# Patient Record
Sex: FEMALE | Race: WHITE | NOT HISPANIC OR LATINO | URBAN - METROPOLITAN AREA
[De-identification: names, ages, dates, MRNs, and addresses within clinical notes are randomized per-mention and may not be internally consistent; named-entity substitution may affect disease eponyms.]

---

## 2017-06-30 ENCOUNTER — EMERGENCY (EMERGENCY)
Facility: HOSPITAL | Age: 82
LOS: 1 days | Discharge: HOME | End: 2017-06-30
Attending: EMERGENCY MEDICINE | Admitting: EMERGENCY MEDICINE
Payer: MEDICARE

## 2017-06-30 VITALS
HEART RATE: 76 BPM | DIASTOLIC BLOOD PRESSURE: 95 MMHG | TEMPERATURE: 97 F | RESPIRATION RATE: 16 BRPM | SYSTOLIC BLOOD PRESSURE: 155 MMHG | OXYGEN SATURATION: 99 %

## 2017-06-30 VITALS
OXYGEN SATURATION: 99 % | DIASTOLIC BLOOD PRESSURE: 80 MMHG | RESPIRATION RATE: 16 BRPM | TEMPERATURE: 98 F | HEART RATE: 78 BPM | HEIGHT: 58 IN | WEIGHT: 149.91 LBS | SYSTOLIC BLOOD PRESSURE: 146 MMHG

## 2017-06-30 DIAGNOSIS — Z96.651 PRESENCE OF RIGHT ARTIFICIAL KNEE JOINT: Chronic | ICD-10-CM

## 2017-06-30 PROCEDURE — 99283 EMERGENCY DEPT VISIT LOW MDM: CPT | Mod: 25

## 2017-06-30 PROCEDURE — 73562 X-RAY EXAM OF KNEE 3: CPT

## 2017-06-30 PROCEDURE — 99283 EMERGENCY DEPT VISIT LOW MDM: CPT

## 2017-06-30 PROCEDURE — 73562 X-RAY EXAM OF KNEE 3: CPT | Mod: 26,LT

## 2017-06-30 NOTE — ED PROVIDER NOTE - PHYSICAL EXAMINATION
Constitutional: Alert, NAD.   ENT: Airway patent. Nose clear. Mouth with normal mucosa.   Head: Atraumatic.   Eyes: Clear bilaterally. PERRL.   Cardiac: Normal rate.   Respiratory: Breath sounds clear bilaterally.   GI: Abdomen soft, non-tender, no guarding.   : No CVA or bladder tenderness.   Musculoskeletal: painful ROM of left knee and tenderness to palpation   Neuro: alert and oriented, no focal deficits, no motor or sensory deficits.   Skin: Dry, intact, no rash.   Psych: normal mood and affect.

## 2017-06-30 NOTE — ED PROVIDER NOTE - MEDICAL DECISION MAKING DETAILS
Patient given detailed discharge and return instructions and verbalized understanding.  Patient will follow up without fail.  All questions answered.  Able to ambulate w/o difficulty.

## 2017-06-30 NOTE — ED PROVIDER NOTE - OBJECTIVE STATEMENT
CC: left knee pain   Presenting symptoms: 85yo female s/p trip on mat at gym and fell onto her left knee.  Patient c/o only left knee pain and injury.  Pertinent Positives: knee pain  Pertinent Negatives: no head injury, no LOC, no CP, no SOB, no abd pain  Timing: sudden JPTA  Quality: aching  Radiation: none  Severity: moderate  Aggravating Factors: bending knee  Relieving Factors: rest/immobilization

## 2017-06-30 NOTE — ED PROVIDER NOTE - NS ED ROS FT
no fever  no chest pain  no SOB  no abd pain  no HA  + left knee pain  All other ROS negative except as per HPI

## 2017-06-30 NOTE — ED ADULT NURSE NOTE - OBJECTIVE STATEMENT
84 year old female in no acute distress, A & O X 3, BIBA from gym c/o left knee pain s/p mechanical fall. Denies head injury, carpet imprint noted with mild swelling to left knee. +, + pedal pulses bilaterally. 84 year old female in no acute distress, A & O X 3, BIBA from gym c/o left knee pain s/p mechanical fall. Denies head injury, carpet imprint noted with mild swelling to left knee. +, + pedal pulses bilaterally. Fall precautions in place, will monitor.

## 2017-06-30 NOTE — ED ADULT TRIAGE NOTE - CHIEF COMPLAINT QUOTE
BIBA, patient is awake and oriented times 3, complains a slip and fall at the pool at the gym today, denies any head injury, denies any use of blood thinners

## 2018-09-12 ENCOUNTER — APPOINTMENT (OUTPATIENT)
Dept: DERMATOLOGY | Facility: CLINIC | Age: 83
End: 2018-09-12
Payer: MEDICARE

## 2018-09-12 PROCEDURE — 99203 OFFICE O/P NEW LOW 30 MIN: CPT | Mod: 25

## 2018-09-12 PROCEDURE — 17000 DESTRUCT PREMALG LESION: CPT

## 2018-12-18 ENCOUNTER — EMERGENCY (EMERGENCY)
Facility: HOSPITAL | Age: 83
LOS: 1 days | Discharge: HOME | End: 2018-12-18
Attending: STUDENT IN AN ORGANIZED HEALTH CARE EDUCATION/TRAINING PROGRAM
Payer: MEDICARE

## 2018-12-18 VITALS
RESPIRATION RATE: 18 BRPM | HEIGHT: 58 IN | TEMPERATURE: 98 F | HEART RATE: 85 BPM | DIASTOLIC BLOOD PRESSURE: 72 MMHG | SYSTOLIC BLOOD PRESSURE: 119 MMHG | WEIGHT: 149.91 LBS | OXYGEN SATURATION: 99 %

## 2018-12-18 DIAGNOSIS — Z96.651 PRESENCE OF RIGHT ARTIFICIAL KNEE JOINT: Chronic | ICD-10-CM

## 2018-12-18 PROCEDURE — 99284 EMERGENCY DEPT VISIT MOD MDM: CPT | Mod: 25

## 2018-12-18 PROCEDURE — 93010 ELECTROCARDIOGRAM REPORT: CPT

## 2018-12-18 RX ORDER — ONDANSETRON 8 MG/1
4 TABLET, FILM COATED ORAL ONCE
Qty: 0 | Refills: 0 | Status: COMPLETED | OUTPATIENT
Start: 2018-12-18 | End: 2018-12-19

## 2018-12-18 RX ORDER — FAMOTIDINE 10 MG/ML
20 INJECTION INTRAVENOUS ONCE
Qty: 0 | Refills: 0 | Status: COMPLETED | OUTPATIENT
Start: 2018-12-18 | End: 2018-12-19

## 2018-12-18 RX ORDER — SODIUM CHLORIDE 9 MG/ML
1000 INJECTION INTRAMUSCULAR; INTRAVENOUS; SUBCUTANEOUS
Qty: 0 | Refills: 0 | Status: DISCONTINUED | OUTPATIENT
Start: 2018-12-18 | End: 2018-12-23

## 2018-12-18 RX ORDER — SODIUM CHLORIDE 9 MG/ML
1000 INJECTION INTRAMUSCULAR; INTRAVENOUS; SUBCUTANEOUS ONCE
Qty: 0 | Refills: 0 | Status: COMPLETED | OUTPATIENT
Start: 2018-12-18 | End: 2018-12-18

## 2018-12-18 RX ORDER — SODIUM CHLORIDE 9 MG/ML
3 INJECTION INTRAMUSCULAR; INTRAVENOUS; SUBCUTANEOUS ONCE
Qty: 0 | Refills: 0 | Status: COMPLETED | OUTPATIENT
Start: 2018-12-18 | End: 2018-12-18

## 2018-12-18 RX ORDER — KETOROLAC TROMETHAMINE 30 MG/ML
15 SYRINGE (ML) INJECTION ONCE
Qty: 0 | Refills: 0 | Status: DISCONTINUED | OUTPATIENT
Start: 2018-12-18 | End: 2018-12-18

## 2018-12-18 NOTE — ED PROVIDER NOTE - CHPI ED SYMPTOMS NEG
no fever/no blood in stool/hematemesis, chest pain, palpitations, dizziness, shortness of breath/no chills

## 2018-12-18 NOTE — ED ADULT TRIAGE NOTE - CHIEF COMPLAINT QUOTE
pt a+ox3, BIBA c/o abd pain, n/v/d. pt reports eating dinner without any issue around 1700 and symptoms onset at 2000. pt denies chest pain or SOB, headache. pt denies recent fever, chills, nightsweats.

## 2018-12-18 NOTE — ED PROVIDER NOTE - MEDICAL DECISION MAKING DETAILS
Will hydrate, obtain labs and treat abdominal tenderness, likely secondary to cramping from diarrhea and vomiting, if not improved will obtain CT.

## 2018-12-18 NOTE — ED PROVIDER NOTE - OBJECTIVE STATEMENT
86 y/o F with PMHx of hypothyroid and HLD presents to ED c/o sudden onset of weakness that began tonight. States she has been experiencing multiple episodes of diarrhea and vomiting for several hours and after her most recent episode, while sitting on the couch, she went to stand up, felt weak and had to sit back down immediately to avoid falling. As per son, during this episode of sudden weakness when she sat back down on the couch "she was just staring into space and did not respond to our questions", which is why they activated EMS. Since being in the ED, she has began to develop a headache. Denies recent fevers or chills, chest pain, palpitations, shortness of breath, dizziness, abdominal pain, hematemesis or blood in the stool. Has not been on antibiotics recently. Does note she had a cold last week. No further acute complaints at this time.

## 2018-12-19 VITALS
DIASTOLIC BLOOD PRESSURE: 72 MMHG | RESPIRATION RATE: 18 BRPM | TEMPERATURE: 98 F | HEART RATE: 86 BPM | SYSTOLIC BLOOD PRESSURE: 121 MMHG | OXYGEN SATURATION: 99 %

## 2018-12-19 LAB
ALBUMIN SERPL ELPH-MCNC: 4.1 G/DL — SIGNIFICANT CHANGE UP (ref 3.3–5.2)
ALP SERPL-CCNC: 61 U/L — SIGNIFICANT CHANGE UP (ref 40–120)
ALT FLD-CCNC: 13 U/L — SIGNIFICANT CHANGE UP
ANION GAP SERPL CALC-SCNC: 15 MMOL/L — SIGNIFICANT CHANGE UP (ref 5–17)
APPEARANCE UR: ABNORMAL
AST SERPL-CCNC: 21 U/L — SIGNIFICANT CHANGE UP
BACTERIA # UR AUTO: ABNORMAL
BASOPHILS # BLD AUTO: 0 K/UL — SIGNIFICANT CHANGE UP (ref 0–0.2)
BASOPHILS NFR BLD AUTO: 0 % — SIGNIFICANT CHANGE UP (ref 0–2)
BILIRUB SERPL-MCNC: 0.5 MG/DL — SIGNIFICANT CHANGE UP (ref 0.4–2)
BILIRUB UR-MCNC: NEGATIVE — SIGNIFICANT CHANGE UP
BUN SERPL-MCNC: 21 MG/DL — HIGH (ref 8–20)
CALCIUM SERPL-MCNC: 9.4 MG/DL — SIGNIFICANT CHANGE UP (ref 8.6–10.2)
CHLORIDE SERPL-SCNC: 103 MMOL/L — SIGNIFICANT CHANGE UP (ref 98–107)
CO2 SERPL-SCNC: 25 MMOL/L — SIGNIFICANT CHANGE UP (ref 22–29)
COLOR SPEC: YELLOW — SIGNIFICANT CHANGE UP
CREAT SERPL-MCNC: 0.59 MG/DL — SIGNIFICANT CHANGE UP (ref 0.5–1.3)
DIFF PNL FLD: ABNORMAL
EOSINOPHIL # BLD AUTO: 0.1 K/UL — SIGNIFICANT CHANGE UP (ref 0–0.5)
EOSINOPHIL NFR BLD AUTO: 0 % — SIGNIFICANT CHANGE UP (ref 0–5)
EPI CELLS # UR: SIGNIFICANT CHANGE UP
GLUCOSE SERPL-MCNC: 118 MG/DL — HIGH (ref 70–115)
GLUCOSE UR QL: NEGATIVE MG/DL — SIGNIFICANT CHANGE UP
HCT VFR BLD CALC: 46.3 % — SIGNIFICANT CHANGE UP (ref 37–47)
HGB BLD-MCNC: 14.7 G/DL — SIGNIFICANT CHANGE UP (ref 12–16)
KETONES UR-MCNC: ABNORMAL
LEUKOCYTE ESTERASE UR-ACNC: ABNORMAL
LIDOCAIN IGE QN: 20 U/L — LOW (ref 22–51)
LYMPHOCYTES # BLD AUTO: 1.2 K/UL — SIGNIFICANT CHANGE UP (ref 1–4.8)
LYMPHOCYTES # BLD AUTO: 10 % — LOW (ref 20–55)
MCHC RBC-ENTMCNC: 29.6 PG — SIGNIFICANT CHANGE UP (ref 27–31)
MCHC RBC-ENTMCNC: 31.7 G/DL — LOW (ref 32–36)
MCV RBC AUTO: 93.3 FL — SIGNIFICANT CHANGE UP (ref 81–99)
MONOCYTES # BLD AUTO: 0.9 K/UL — HIGH (ref 0–0.8)
MONOCYTES NFR BLD AUTO: 7 % — SIGNIFICANT CHANGE UP (ref 3–10)
NEUTROPHILS # BLD AUTO: 13.3 K/UL — HIGH (ref 1.8–8)
NEUTROPHILS NFR BLD AUTO: 83 % — HIGH (ref 37–73)
NITRITE UR-MCNC: POSITIVE
PH UR: 6.5 — SIGNIFICANT CHANGE UP (ref 5–8)
PLAT MORPH BLD: NORMAL — SIGNIFICANT CHANGE UP
PLATELET # BLD AUTO: 215 K/UL — SIGNIFICANT CHANGE UP (ref 150–400)
POTASSIUM SERPL-MCNC: 4.7 MMOL/L — SIGNIFICANT CHANGE UP (ref 3.5–5.3)
POTASSIUM SERPL-SCNC: 4.7 MMOL/L — SIGNIFICANT CHANGE UP (ref 3.5–5.3)
PROT SERPL-MCNC: 7.1 G/DL — SIGNIFICANT CHANGE UP (ref 6.6–8.7)
PROT UR-MCNC: 30 MG/DL
RBC # BLD: 4.96 M/UL — SIGNIFICANT CHANGE UP (ref 4.4–5.2)
RBC # FLD: 13.4 % — SIGNIFICANT CHANGE UP (ref 11–15.6)
RBC BLD AUTO: NORMAL — SIGNIFICANT CHANGE UP
RBC CASTS # UR COMP ASSIST: SIGNIFICANT CHANGE UP /HPF (ref 0–4)
SODIUM SERPL-SCNC: 143 MMOL/L — SIGNIFICANT CHANGE UP (ref 135–145)
SP GR SPEC: 1.01 — SIGNIFICANT CHANGE UP (ref 1.01–1.02)
UROBILINOGEN FLD QL: NEGATIVE MG/DL — SIGNIFICANT CHANGE UP
WBC # BLD: 15.6 K/UL — HIGH (ref 4.8–10.8)
WBC # FLD AUTO: 15.6 K/UL — HIGH (ref 4.8–10.8)
WBC UR QL: SIGNIFICANT CHANGE UP

## 2018-12-19 PROCEDURE — 96375 TX/PRO/DX INJ NEW DRUG ADDON: CPT

## 2018-12-19 PROCEDURE — 74177 CT ABD & PELVIS W/CONTRAST: CPT

## 2018-12-19 PROCEDURE — 99284 EMERGENCY DEPT VISIT MOD MDM: CPT | Mod: 25

## 2018-12-19 PROCEDURE — 85027 COMPLETE CBC AUTOMATED: CPT

## 2018-12-19 PROCEDURE — 96374 THER/PROPH/DIAG INJ IV PUSH: CPT | Mod: XU

## 2018-12-19 PROCEDURE — 81001 URINALYSIS AUTO W/SCOPE: CPT

## 2018-12-19 PROCEDURE — 74177 CT ABD & PELVIS W/CONTRAST: CPT | Mod: 26

## 2018-12-19 PROCEDURE — 36415 COLL VENOUS BLD VENIPUNCTURE: CPT

## 2018-12-19 PROCEDURE — 80053 COMPREHEN METABOLIC PANEL: CPT

## 2018-12-19 PROCEDURE — 83690 ASSAY OF LIPASE: CPT

## 2018-12-19 PROCEDURE — 93005 ELECTROCARDIOGRAM TRACING: CPT

## 2018-12-19 RX ORDER — CEPHALEXIN 500 MG
1 CAPSULE ORAL
Qty: 20 | Refills: 0
Start: 2018-12-19 | End: 2018-12-28

## 2018-12-19 RX ORDER — ONDANSETRON 8 MG/1
1 TABLET, FILM COATED ORAL
Qty: 16 | Refills: 0
Start: 2018-12-19 | End: 2018-12-22

## 2018-12-19 RX ORDER — CEFTRIAXONE 500 MG/1
1 INJECTION, POWDER, FOR SOLUTION INTRAMUSCULAR; INTRAVENOUS ONCE
Qty: 0 | Refills: 0 | Status: COMPLETED | OUTPATIENT
Start: 2018-12-19 | End: 2018-12-19

## 2018-12-19 RX ADMIN — FAMOTIDINE 20 MILLIGRAM(S): 10 INJECTION INTRAVENOUS at 00:42

## 2018-12-19 RX ADMIN — CEFTRIAXONE 100 GRAM(S): 500 INJECTION, POWDER, FOR SOLUTION INTRAMUSCULAR; INTRAVENOUS at 03:38

## 2018-12-19 RX ADMIN — SODIUM CHLORIDE 3 MILLILITER(S): 9 INJECTION INTRAMUSCULAR; INTRAVENOUS; SUBCUTANEOUS at 00:45

## 2018-12-19 RX ADMIN — SODIUM CHLORIDE 125 MILLILITER(S): 9 INJECTION INTRAMUSCULAR; INTRAVENOUS; SUBCUTANEOUS at 02:05

## 2018-12-19 RX ADMIN — SODIUM CHLORIDE 1000 MILLILITER(S): 9 INJECTION INTRAMUSCULAR; INTRAVENOUS; SUBCUTANEOUS at 00:32

## 2018-12-19 RX ADMIN — Medication 15 MILLIGRAM(S): at 00:42

## 2018-12-19 RX ADMIN — ONDANSETRON 4 MILLIGRAM(S): 8 TABLET, FILM COATED ORAL at 00:41

## 2018-12-19 NOTE — ED ADULT NURSE NOTE - OBJECTIVE STATEMENT
Pt reports n/v/d since ~2000, son at bedside reports findings pt and pt was having a "blank stare and unable to register situation", pt A&Ox3 at the time, tenderness to palpation, pleasant and cooperative to care, offers no complaints, orders initiated.

## 2018-12-19 NOTE — ED ADULT NURSE NOTE - NSIMPLEMENTINTERV_GEN_ALL_ED
Implemented All Universal Safety Interventions:  [place] to call system. Call bell, personal items and telephone within reach. Instruct patient to call for assistance. Room bathroom lighting operational. Non-slip footwear when patient is off stretcher. Physically safe environment: no spills, clutter or unnecessary equipment. Stretcher in lowest position, wheels locked, appropriate side rails in place.

## 2018-12-19 NOTE — ED ADULT NURSE REASSESSMENT NOTE - NS ED NURSE REASSESS COMMENT FT1
Pt placed on bedpan, tolerated well, offers no complaints, awaiting test results, pt and son verbalize understanding of plan, safety maintained.

## 2018-12-19 NOTE — ED ADULT NURSE REASSESSMENT NOTE - NS ED NURSE REASSESS COMMENT FT1
Pt reports feeling better, discharge instructions given and explained, including discharge medication purpose, dose, frequency and s/e, pt verbalized understanding of instructions, questions encouraged and answered appropriately, PIV d/c'd per facility protocol, pt tolerated well, DCD in place, VSS, pt safely discharged home with son.

## 2019-04-23 ENCOUNTER — OTHER (OUTPATIENT)
Age: 84
End: 2019-04-23

## 2019-04-23 ENCOUNTER — APPOINTMENT (OUTPATIENT)
Dept: GASTROENTEROLOGY | Facility: CLINIC | Age: 84
End: 2019-04-23
Payer: MEDICARE

## 2019-04-23 VITALS
HEIGHT: 58 IN | SYSTOLIC BLOOD PRESSURE: 126 MMHG | OXYGEN SATURATION: 98 % | WEIGHT: 138 LBS | HEART RATE: 68 BPM | BODY MASS INDEX: 28.97 KG/M2 | RESPIRATION RATE: 14 BRPM | DIASTOLIC BLOOD PRESSURE: 68 MMHG

## 2019-04-23 LAB — CRP SERPL-MCNC: 0.17 MG/DL

## 2019-04-23 PROCEDURE — 99204 OFFICE O/P NEW MOD 45 MIN: CPT

## 2019-04-23 PROCEDURE — 82272 OCCULT BLD FECES 1-3 TESTS: CPT

## 2019-04-25 LAB
C DIFF TOX GENS STL QL NAA+PROBE: NORMAL
CDIFF BY PCR: NOT DETECTED
ERYTHROCYTE [SEDIMENTATION RATE] IN BLOOD BY WESTERGREN METHOD: 3 MM/HR
WRIGHT STN STL: NEGATIVE

## 2019-04-25 NOTE — CONSULT LETTER
[Dear  ___] : Dear  [unfilled], [Please see my note below.] : Please see my note below. [Consult Closing:] : Thank you very much for allowing me to participate in the care of this patient.  If you have any questions, please do not hesitate to contact me. [Consult Letter:] : I had the pleasure of evaluating your patient, [unfilled]. [Sincerely,] : Sincerely, [FreeTextEntry1] : Episodic diarrhea for about a year. No weight loss. History of neurofibromatosis no new medications. Distant antibiotics for UTI. Stool studies requested. CAT scan requested. Reevaluate in 2 months here. [FreeTextEntry3] : Olu Elizabeth MD FACG\par Diplomate American Board of Internal Medicine and Gastroenterolgy\par [facility]\par

## 2019-04-25 NOTE — PHYSICAL EXAM
[General Appearance - Alert] : alert [General Appearance - In No Acute Distress] : in no acute distress [Sclera] : the sclera and conjunctiva were normal [PERRL With Normal Accommodation] : pupils were equal in size, round, and reactive to light [Extraocular Movements] : extraocular movements were intact [Outer Ear] : the ears and nose were normal in appearance [Oropharynx] : the oropharynx was normal [Neck Appearance] : the appearance of the neck was normal [Neck Cervical Mass (___cm)] : no neck mass was observed [Jugular Venous Distention Increased] : there was no jugular-venous distention [Thyroid Diffuse Enlargement] : the thyroid was not enlarged [Thyroid Nodule] : there were no palpable thyroid nodules [Auscultation Breath Sounds / Voice Sounds] : lungs were clear to auscultation bilaterally [Heart Rate And Rhythm] : heart rate was normal and rhythm regular [Heart Sounds] : normal S1 and S2 [Murmurs] : no murmurs [Heart Sounds Gallop] : no gallops [Heart Sounds Pericardial Friction Rub] : no pericardial rub [Bowel Sounds] : normal bowel sounds [Abdomen Soft] : soft [] : no hepato-splenomegaly [Abdomen Tenderness] : non-tender [Normal Sphincter Tone] : normal sphincter tone [Abdomen Mass (___ Cm)] : no abdominal mass palpated [No Rectal Mass] : no rectal mass [Occult Blood Positive] : stool was negative for occult blood [FreeTextEntry1] : Stool is soft, semi- formed.. Brown. Occult blood negative.

## 2019-04-25 NOTE — ASSESSMENT
[FreeTextEntry1] : Chronic episodic diarrhea. Not clearly dietary, related. No offending medications, episodic diarrhea, suggests some dietary component.\par Recommended stool studies to be performed on the loose is still available. Proceed with CAT scan abdomen and pelvis with oral and IV contrast to exclude any major ongoing pathology. GI office followup in 2 months.

## 2019-04-25 NOTE — HISTORY OF PRESENT ILLNESS
[FreeTextEntry1] : T6-year-old white female with history of hypothyroidism, hyperlipidemia, osteoporosis, glaucoma, obesity, neurofibromatosis, P., referred for evaluation of episodic diarrhea or patient states that diarrhea recurs on a weekly basis. 5 or 6 days per week. Bowel movements will be normal. Formed and without any bleeding. He denies having any recent travel. She had last received antibiotics in December for urinary tract infection without any change in symptoms. Patient alleges that the loose bowel movements occur weekly and have been ongoing or about a year or so prior investigation. No weight loss. No new medications. There no history of lactose intolerance. We'll 16 with a Dr. Medina is also not available. Allegedly she has a distant history of colon polyps. Patient is known to have hemorrhoids and has occasional bleeding from the hemorrhoids being bright red blood and also notes occasional bleeding on the toilet paper with wiping. Denies any weight loss, tenderness, fever, or chills. Denies abdominal pain. Occasional cramps. No distention. Multiple medications are taken. None of which has recently changed. Family history is negative for colorectal neoplasia. No history of any liver disease.

## 2019-04-26 ENCOUNTER — OTHER (OUTPATIENT)
Age: 84
End: 2019-04-26

## 2019-04-28 LAB
BACTERIA STL CULT: NORMAL
DEPRECATED O AND P PREP STL: NORMAL
G LAMBLIA AG STL QL: NORMAL

## 2019-05-06 ENCOUNTER — FORM ENCOUNTER (OUTPATIENT)
Age: 84
End: 2019-05-06

## 2019-05-07 ENCOUNTER — OUTPATIENT (OUTPATIENT)
Dept: OUTPATIENT SERVICES | Facility: HOSPITAL | Age: 84
LOS: 1 days | End: 2019-05-07

## 2019-05-07 ENCOUNTER — APPOINTMENT (OUTPATIENT)
Dept: CT IMAGING | Facility: CLINIC | Age: 84
End: 2019-05-07
Payer: MEDICARE

## 2019-05-07 DIAGNOSIS — R10.817 GENERALIZED ABDOMINAL TENDERNESS: ICD-10-CM

## 2019-05-07 DIAGNOSIS — Z96.651 PRESENCE OF RIGHT ARTIFICIAL KNEE JOINT: Chronic | ICD-10-CM

## 2019-05-07 PROCEDURE — 74177 CT ABD & PELVIS W/CONTRAST: CPT | Mod: 26

## 2019-05-08 ENCOUNTER — OTHER (OUTPATIENT)
Age: 84
End: 2019-05-08

## 2019-05-08 ENCOUNTER — APPOINTMENT (OUTPATIENT)
Dept: DERMATOLOGY | Facility: CLINIC | Age: 84
End: 2019-05-08
Payer: MEDICARE

## 2019-05-08 PROCEDURE — 99213 OFFICE O/P EST LOW 20 MIN: CPT | Mod: 25

## 2019-05-08 PROCEDURE — 17000 DESTRUCT PREMALG LESION: CPT

## 2019-05-14 ENCOUNTER — FORM ENCOUNTER (OUTPATIENT)
Age: 84
End: 2019-05-14

## 2019-05-15 ENCOUNTER — APPOINTMENT (OUTPATIENT)
Dept: MRI IMAGING | Facility: CLINIC | Age: 84
End: 2019-05-15
Payer: MEDICARE

## 2019-05-15 ENCOUNTER — OUTPATIENT (OUTPATIENT)
Dept: OUTPATIENT SERVICES | Facility: HOSPITAL | Age: 84
LOS: 1 days | End: 2019-05-15

## 2019-05-15 DIAGNOSIS — Z96.651 PRESENCE OF RIGHT ARTIFICIAL KNEE JOINT: Chronic | ICD-10-CM

## 2019-05-15 DIAGNOSIS — K80.50 CALCULUS OF BILE DUCT WITHOUT CHOLANGITIS OR CHOLECYSTITIS WITHOUT OBSTRUCTION: ICD-10-CM

## 2019-05-15 PROCEDURE — 74181 MRI ABDOMEN W/O CONTRAST: CPT | Mod: 26

## 2019-06-11 ENCOUNTER — APPOINTMENT (OUTPATIENT)
Dept: DERMATOLOGY | Facility: CLINIC | Age: 84
End: 2019-06-11
Payer: MEDICARE

## 2019-06-11 PROCEDURE — 99212 OFFICE O/P EST SF 10 MIN: CPT

## 2019-06-28 ENCOUNTER — APPOINTMENT (OUTPATIENT)
Dept: GASTROENTEROLOGY | Facility: CLINIC | Age: 84
End: 2019-06-28
Payer: MEDICARE

## 2019-06-28 VITALS
DIASTOLIC BLOOD PRESSURE: 88 MMHG | OXYGEN SATURATION: 99 % | BODY MASS INDEX: 30.02 KG/M2 | RESPIRATION RATE: 16 BRPM | WEIGHT: 143 LBS | SYSTOLIC BLOOD PRESSURE: 149 MMHG | HEIGHT: 58 IN | HEART RATE: 70 BPM

## 2019-06-28 PROCEDURE — 82272 OCCULT BLD FECES 1-3 TESTS: CPT

## 2019-06-28 PROCEDURE — 99214 OFFICE O/P EST MOD 30 MIN: CPT

## 2019-06-29 LAB
ALBUMIN SERPL ELPH-MCNC: 4.3 G/DL
ALP BLD-CCNC: 62 U/L
ALT SERPL-CCNC: 15 U/L
ANION GAP SERPL CALC-SCNC: 10 MMOL/L
AST SERPL-CCNC: 21 U/L
BASOPHILS # BLD AUTO: 0.03 K/UL
BASOPHILS NFR BLD AUTO: 0.5 %
BILIRUB SERPL-MCNC: 0.5 MG/DL
BUN SERPL-MCNC: 17 MG/DL
CALCIUM SERPL-MCNC: 9.5 MG/DL
CHLORIDE SERPL-SCNC: 106 MMOL/L
CO2 SERPL-SCNC: 25 MMOL/L
CREAT SERPL-MCNC: 0.56 MG/DL
CRP SERPL-MCNC: 0.14 MG/DL
EOSINOPHIL # BLD AUTO: 0.09 K/UL
EOSINOPHIL NFR BLD AUTO: 1.5 %
ERYTHROCYTE [SEDIMENTATION RATE] IN BLOOD BY WESTERGREN METHOD: 24 MM/HR
GLUCOSE SERPL-MCNC: 89 MG/DL
HCT VFR BLD CALC: 44.1 %
HGB BLD-MCNC: 14.1 G/DL
IMM GRANULOCYTES NFR BLD AUTO: 0.6 %
INR PPP: 1.07 RATIO
LYMPHOCYTES # BLD AUTO: 1.25 K/UL
LYMPHOCYTES NFR BLD AUTO: 20.2 %
MAN DIFF?: NORMAL
MCHC RBC-ENTMCNC: 29.9 PG
MCHC RBC-ENTMCNC: 32 GM/DL
MCV RBC AUTO: 93.4 FL
MONOCYTES # BLD AUTO: 0.68 K/UL
MONOCYTES NFR BLD AUTO: 11 %
NEUTROPHILS # BLD AUTO: 4.11 K/UL
NEUTROPHILS NFR BLD AUTO: 66.2 %
PLATELET # BLD AUTO: 198 K/UL
POTASSIUM SERPL-SCNC: 5 MMOL/L
PROT SERPL-MCNC: 6.8 G/DL
PT BLD: 12.1 SEC
RBC # BLD: 4.72 M/UL
RBC # FLD: 13 %
SODIUM SERPL-SCNC: 141 MMOL/L
TSH SERPL-ACNC: 2.47 UIU/ML
WBC # FLD AUTO: 6.2 K/UL

## 2019-06-29 NOTE — ASSESSMENT
[FreeTextEntry1] : Episodic diarrhea of unknown etiology. Stool studies negative. Blood work negative. CAT scan and MRI and negative per currently patient has formed stool in the rectal vault. Patient was advised to use Imodium on p.r.n. basis. Office followup in 3 months.\par Defer on colonoscopy to advanced age and multiple medical comorbidities.

## 2019-06-29 NOTE — HISTORY OF PRESENT ILLNESS
[FreeTextEntry1] : 86-year-old white female with history of neurofibromatosis, hypothyroidism, obesity,osteoporosis\par and hyperlipidemia.  GI evaluation was sought for episodic diarrhea occurring once to twice per week chronically. No recent antibiotics. No recent foreign travel. No recent change in medications. All initial stool studies are negative. A CAT scan, abdomen and pelvis was performed owing that patient is status post cholecystectomy. There is common bile duct dilatation to 16 mm and a question of a distal CBD filling defect was noted. Mild dilatation of the pancreatic duct was noted. Otherwise, negative. CAT scan to MRI suggested. MRI performed and showed no evidence of common bile duct stone status post cholecystectomy and 16 mm common bile duct which tapers distally. No obvious malignancy. Repeat blood work has performed shows a normal CBC and CMP.  All LFTs are normal. thyroid function is normal. Sedimentation rate 24, unchanged from prior. And, CRP, normal.

## 2019-06-29 NOTE — PHYSICAL EXAM
[General Appearance - Alert] : alert [General Appearance - In No Acute Distress] : in no acute distress [Sclera] : the sclera and conjunctiva were normal [PERRL With Normal Accommodation] : pupils were equal in size, round, and reactive to light [Extraocular Movements] : extraocular movements were intact [Outer Ear] : the ears and nose were normal in appearance [Oropharynx] : the oropharynx was normal [Neck Appearance] : the appearance of the neck was normal [Neck Cervical Mass (___cm)] : no neck mass was observed [Jugular Venous Distention Increased] : there was no jugular-venous distention [Thyroid Diffuse Enlargement] : the thyroid was not enlarged [Auscultation Breath Sounds / Voice Sounds] : lungs were clear to auscultation bilaterally [Thyroid Nodule] : there were no palpable thyroid nodules [Heart Rate And Rhythm] : heart rate was normal and rhythm regular [Heart Sounds] : normal S1 and S2 [Heart Sounds Gallop] : no gallops [Murmurs] : no murmurs [Heart Sounds Pericardial Friction Rub] : no pericardial rub [Bowel Sounds] : normal bowel sounds [Abdomen Tenderness] : non-tender [Abdomen Mass (___ Cm)] : no abdominal mass palpated [] : no hepato-splenomegaly [Abdomen Soft] : soft [No Rectal Mass] : no rectal mass [Occult Blood Positive] : stool was negative for occult blood [Normal Sphincter Tone] : normal sphincter tone [Multiple] : multiple [FreeTextEntry1] : innumerable neurofibromas

## 2019-07-11 ENCOUNTER — APPOINTMENT (OUTPATIENT)
Dept: DERMATOLOGY | Facility: CLINIC | Age: 84
End: 2019-07-11
Payer: MEDICARE

## 2019-07-11 ENCOUNTER — RESULT REVIEW (OUTPATIENT)
Age: 84
End: 2019-07-11

## 2019-07-11 PROCEDURE — 99213 OFFICE O/P EST LOW 20 MIN: CPT | Mod: 25

## 2019-07-11 PROCEDURE — 11102 TANGNTL BX SKIN SINGLE LES: CPT

## 2019-07-25 ENCOUNTER — EMERGENCY (EMERGENCY)
Facility: HOSPITAL | Age: 84
LOS: 1 days | Discharge: HOME | End: 2019-07-25
Attending: EMERGENCY MEDICINE
Payer: MEDICARE

## 2019-07-25 VITALS
WEIGHT: 143.08 LBS | OXYGEN SATURATION: 100 % | DIASTOLIC BLOOD PRESSURE: 90 MMHG | TEMPERATURE: 98 F | HEIGHT: 58 IN | RESPIRATION RATE: 20 BRPM | SYSTOLIC BLOOD PRESSURE: 162 MMHG | HEART RATE: 79 BPM

## 2019-07-25 DIAGNOSIS — Z96.651 PRESENCE OF RIGHT ARTIFICIAL KNEE JOINT: Chronic | ICD-10-CM

## 2019-07-25 PROCEDURE — 73110 X-RAY EXAM OF WRIST: CPT

## 2019-07-25 PROCEDURE — 99284 EMERGENCY DEPT VISIT MOD MDM: CPT

## 2019-07-25 PROCEDURE — 73110 X-RAY EXAM OF WRIST: CPT | Mod: 26,LT

## 2019-07-25 PROCEDURE — 70450 CT HEAD/BRAIN W/O DYE: CPT | Mod: 26

## 2019-07-25 PROCEDURE — 70450 CT HEAD/BRAIN W/O DYE: CPT

## 2019-07-25 RX ORDER — ACETAMINOPHEN 500 MG
650 TABLET ORAL ONCE
Refills: 0 | Status: COMPLETED | OUTPATIENT
Start: 2019-07-25 | End: 2019-07-25

## 2019-07-25 RX ADMIN — Medication 650 MILLIGRAM(S): at 14:28

## 2019-07-25 NOTE — ED STATDOCS - CARE PLAN
Principal Discharge DX:	Fall Principal Discharge DX:	Fall  Secondary Diagnosis:	Facial contusion  Secondary Diagnosis:	Wrist pain

## 2019-07-25 NOTE — ED ADULT NURSE NOTE - INTERVENTIONS DEFINITIONS
Call bell, personal items and telephone within reach/Instruct patient to call for assistance/Provide visual cue, wrist band, yellow gown, etc./Monitor gait and stability/[place] to call system/Non-slip footwear when patient is off stretcher

## 2019-07-25 NOTE — ED STATDOCS - SKIN, MLM
(+) Abrasion to nose. (+) Abrasion to 2nd digit of L hand. (+) Abrasion to nose. (+) bruising to 2nd digit of L hand.

## 2019-07-25 NOTE — ED ADULT NURSE NOTE - OBJECTIVE STATEMENT
pt awake, alert and oriented x3 c/o headache and left wrist pain s/p fall at gym two days ago.  denies visual changes, n/v.  denies blood thinners.  Respirations even and unlabored, denies chest pain.  Abdomen soft, nontender, nondistended.  Skin warm and dry.  Moving all extremities well and with purpose.

## 2019-07-25 NOTE — ED STATDOCS - CLINICAL SUMMARY MEDICAL DECISION MAKING FREE TEXT BOX
Pt with HA sp fall 2 days ago, will check CT rule out cranial hemmorrhage pt having sumptoms of concussion, ya give tylenol for pain, ya check XC ray to rule out fracture of L hand. Pt with HA s/p fall 2 days ago, will check CT rule out cranial hemorrhage. Pt having symptoms of concussion, will give Tylenol for pain, will check  X-ray to rule out fracture of L hand.

## 2019-07-25 NOTE — ED STATDOCS - MUSCULOSKELETAL, MLM
(+) Tenderness to base of L thumb, no L hand snuff box tenderness (+) Tenderness to base of L thumb, (+) no L hand snuff box tenderness

## 2019-07-25 NOTE — ED ADULT TRIAGE NOTE - CHIEF COMPLAINT QUOTE
Patient arrived to ED today with c/o trip and fall two days ago and she now c/o headache.  Patient denies hitting her head.  Patient denies blood thinner use.  Patient denies LOC.

## 2019-07-25 NOTE — ED STATDOCS - NS_ ATTENDINGSCRIBEDETAILS _ED_A_ED_FT
I, Alba Rodas, performed the initial face to face bedside interview with this patient regarding history of present illness, review of symptoms and relevant past medical, social and family history.  I completed an independent physical examination.  I was the provider who initially evaluated this patient.  The history, relevant review of systems, past medical and surgical history, medical decision making, and physical examination was documented by the scribe in my presence and I attest to the accuracy of the documentation. Follow-up on ordered tests (ie labs, radiologic studies) and re-evaluation of the patient's status has been communicated to the ACP.  Disposition of the patient will be based on test outcome and response to ED interventions.

## 2019-07-25 NOTE — ED STATDOCS - OBJECTIVE STATEMENT
85 y/o F pt with significant PMHx of Osteoporosis presents to the ED with son c/o fall, onset 2 days ago. Associated sx of L hand pain, HA and nausea.  She describes the pain as an 8/10. Patient was at the gym when she fell forward and hit her face and nose on the floor. Patient states that the HA was getting worse, prompting her to come to the ED today. The Pt has been taking Tylenol for pain, and last took it at 09:30 this morning. She is right hand dominant. Per son the patient is not her normal self. Denies vomiting, CP, and LOC. No further acute complaints at this time.

## 2019-08-07 ENCOUNTER — APPOINTMENT (OUTPATIENT)
Dept: DERMATOLOGY | Facility: CLINIC | Age: 84
End: 2019-08-07

## 2019-08-22 ENCOUNTER — APPOINTMENT (OUTPATIENT)
Dept: DERMATOLOGY | Facility: CLINIC | Age: 84
End: 2019-08-22
Payer: MEDICARE

## 2019-08-22 DIAGNOSIS — R23.8 OTHER SKIN CHANGES: ICD-10-CM

## 2019-08-22 PROCEDURE — 99214 OFFICE O/P EST MOD 30 MIN: CPT

## 2019-09-12 ENCOUNTER — APPOINTMENT (OUTPATIENT)
Dept: DERMATOLOGY | Facility: CLINIC | Age: 84
End: 2019-09-12
Payer: MEDICARE

## 2019-09-12 PROCEDURE — 17311 MOHS 1 STAGE H/N/HF/G: CPT

## 2019-09-12 PROCEDURE — 13121 CMPLX RPR S/A/L 2.6-7.5 CM: CPT

## 2019-09-23 ENCOUNTER — APPOINTMENT (OUTPATIENT)
Dept: GASTROENTEROLOGY | Facility: CLINIC | Age: 84
End: 2019-09-23

## 2019-09-25 ENCOUNTER — APPOINTMENT (OUTPATIENT)
Dept: DERMATOLOGY | Facility: CLINIC | Age: 84
End: 2019-09-25
Payer: MEDICARE

## 2019-09-25 PROCEDURE — 17000 DESTRUCT PREMALG LESION: CPT

## 2019-10-14 ENCOUNTER — APPOINTMENT (OUTPATIENT)
Dept: DERMATOLOGY | Facility: CLINIC | Age: 84
End: 2019-10-14

## 2020-01-15 ENCOUNTER — APPOINTMENT (OUTPATIENT)
Dept: RHEUMATOLOGY | Facility: CLINIC | Age: 85
End: 2020-01-15
Payer: MEDICARE

## 2020-01-15 DIAGNOSIS — Z78.9 OTHER SPECIFIED HEALTH STATUS: ICD-10-CM

## 2020-01-15 PROCEDURE — 99204 OFFICE O/P NEW MOD 45 MIN: CPT

## 2020-01-15 NOTE — HISTORY OF PRESENT ILLNESS
[FreeTextEntry1] : 86y F here for evaluation of bilateral hand pain and other arthritic pain\par \par - currently has arthralgia affecting her hands B/L including CMCs, occasional elbows, knee, hips.\par - pain is currently 8/10 a/w 15 min stiffness daily. Pain is worse at nighttime usually. She has tried APAP w/o relief. NSAIDs are not taken regularly.\par - denies any redness, warmth, swelling of joints\par - also reports chronic fatigue rating 7/10 and 4/10 satisfied with ability to do daily activities \par - No personal or family history of IBD or psoriasis. Denies recent tick/insect bite, UTI, STI, or acute GI illness. No family history autoimmune disease\par - ROS: denies constitutional sxs of fever/chills, weight loss, alopecia, oral/nasal ulcers, sicca sxs, CP/SOB, hematuria/frothy urine, myalgias, rash, nodules, or photosensitivity. \par +Raynaud's [de-identified] : \par \par All other ROS negative except as mentioned in HPI.

## 2020-01-15 NOTE — PHYSICAL EXAM
[General Appearance - Alert] : alert [General Appearance - In No Acute Distress] : in no acute distress [General Appearance - Well Nourished] : well nourished [General Appearance - Well Developed] : well developed [General Appearance - Well-Appearing] : healthy appearing [Sclera] : the sclera and conjunctiva were normal [PERRL With Normal Accommodation] : pupils were equal in size, round, and reactive to light [Outer Ear] : the ears and nose were normal in appearance [Extraocular Movements] : extraocular movements were intact [Oropharynx] : the oropharynx was normal [Neck Appearance] : the appearance of the neck was normal [Jugular Venous Distention Increased] : there was no jugular-venous distention [Neck Cervical Mass (___cm)] : no neck mass was observed [Thyroid Diffuse Enlargement] : the thyroid was not enlarged [Thyroid Nodule] : there were no palpable thyroid nodules [Auscultation Breath Sounds / Voice Sounds] : lungs were clear to auscultation bilaterally [Heart Rate And Rhythm] : heart rate was normal and rhythm regular [Heart Sounds] : normal S1 and S2 [Murmurs] : no murmurs [Heart Sounds Gallop] : no gallops [Full Pulse] : the pedal pulses are present [Heart Sounds Pericardial Friction Rub] : no pericardial rub [Edema] : there was no peripheral edema [Bowel Sounds] : normal bowel sounds [Abdomen Soft] : soft [Abdomen Tenderness] : non-tender [Abdomen Mass (___ Cm)] : no abdominal mass palpated [Abnormal Walk] : normal gait [Nail Clubbing] : no clubbing  or cyanosis of the fingernails [Musculoskeletal - Swelling] : no joint swelling seen [Motor Tone] : muscle strength and tone were normal [Skin Color & Pigmentation] : normal skin color and pigmentation [Skin Turgor] : normal skin turgor [] : no rash [FreeTextEntry1] : Multiple skin neurofibromas throughout face and extremities. [Deep Tendon Reflexes (DTR)] : deep tendon reflexes were 2+ and symmetric [Motor Exam] : the motor exam was normal [Sensation] : the sensory exam was normal to light touch and pinprick [No Focal Deficits] : no focal deficits [Oriented To Time, Place, And Person] : oriented to person, place, and time [Impaired Insight] : insight and judgment were intact [Mood] : the mood was normal [Affect] : the affect was normal

## 2020-01-15 NOTE — ASSESSMENT
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 She is s/p TKR and has remaining OA in her other knee. She has changes in her CMCs/wrists, hands that results in thenar cramping and occasional 3rd digit triggering. She has bilateral hip OA and mild foot MTP/ankle osteoarthritis as well. She has Bilateral AC and GH shoulder OA.\par She has osteoporosis and remains on alendronate weekly, she takes calcium and D3 supplementation. This is managed by her PCP and currently needs no intervention at this time by me.\par \par - start duloxetine 20mg/d, R/B/A discussed including GI upset, nausea/vomiting, lightheadedness particularly while getting up from seated/lying position, and dry eyes/mouth.  The GI and lightheadedness symptoms tend to resolve within 3 days in most people, if they do not the patient has been informed to call and stop taking the medicine if recurring vomiting or severe nausea. \par - start diclofenac gel 1% BID to affected joints as needed\par \par RTO 3 mos

## 2020-01-15 NOTE — CONSULT LETTER
[Dear  ___] : Dear  [unfilled], [Consult Letter:] : I had the pleasure of evaluating your patient, [unfilled]. [Please see my note below.] : Please see my note below. [Sincerely,] : Sincerely, [Consult Closing:] : Thank you very much for allowing me to participate in the care of this patient.  If you have any questions, please do not hesitate to contact me. [FreeTextEntry3] : Redd Irene II, MD\par \par Attending Rheumatologist\par Division of Rheumatology\par [facility]\par     [facility] &\par     Rheumatology at [place],\par     [facility]\par \par \par [facility]\par \par Contact:\par    [phone], fax [phone] ([place] office)\par    [phone], fax [phone] ([place] office)\par

## 2020-04-16 ENCOUNTER — APPOINTMENT (OUTPATIENT)
Dept: RHEUMATOLOGY | Facility: CLINIC | Age: 85
End: 2020-04-16

## 2020-05-04 ENCOUNTER — RX RENEWAL (OUTPATIENT)
Age: 85
End: 2020-05-04

## 2020-06-29 ENCOUNTER — RX RENEWAL (OUTPATIENT)
Age: 85
End: 2020-06-29

## 2020-07-29 ENCOUNTER — RX RENEWAL (OUTPATIENT)
Age: 85
End: 2020-07-29

## 2020-08-19 ENCOUNTER — APPOINTMENT (OUTPATIENT)
Dept: RHEUMATOLOGY | Facility: CLINIC | Age: 85
End: 2020-08-19
Payer: MEDICARE

## 2020-08-19 VITALS
OXYGEN SATURATION: 97 % | SYSTOLIC BLOOD PRESSURE: 138 MMHG | RESPIRATION RATE: 17 BRPM | DIASTOLIC BLOOD PRESSURE: 92 MMHG | HEIGHT: 58 IN | TEMPERATURE: 97.2 F | HEART RATE: 79 BPM

## 2020-08-19 DIAGNOSIS — M17.0 BILATERAL PRIMARY OSTEOARTHRITIS OF KNEE: ICD-10-CM

## 2020-08-19 DIAGNOSIS — M65.331 TRIGGER FINGER, RIGHT MIDDLE FINGER: ICD-10-CM

## 2020-08-19 DIAGNOSIS — M19.042 PRIMARY OSTEOARTHRITIS, RIGHT HAND: ICD-10-CM

## 2020-08-19 DIAGNOSIS — M65.841 OTHER SYNOVITIS AND TENOSYNOVITIS, RIGHT HAND: ICD-10-CM

## 2020-08-19 DIAGNOSIS — M18.0 BILATERAL PRIMARY OSTEOARTHRITIS OF FIRST CARPOMETACARPAL JOINTS: ICD-10-CM

## 2020-08-19 DIAGNOSIS — M19.041 PRIMARY OSTEOARTHRITIS, RIGHT HAND: ICD-10-CM

## 2020-08-19 DIAGNOSIS — M89.49 OTHER HYPERTROPHIC OSTEOARTHROPATHY, MULTIPLE SITES: ICD-10-CM

## 2020-08-19 PROCEDURE — 20600 DRAIN/INJ JOINT/BURSA W/O US: CPT | Mod: RT

## 2020-08-19 PROCEDURE — 99214 OFFICE O/P EST MOD 30 MIN: CPT | Mod: 25

## 2020-08-19 RX ORDER — ALENDRONATE SODIUM 70 MG/1
70 TABLET ORAL
Refills: 0 | Status: DISCONTINUED | COMMUNITY
End: 2020-08-19

## 2020-08-19 RX ORDER — LIDOCAINE HYDROCHLORIDE 10 MG/ML
1 INJECTION, SOLUTION INFILTRATION; PERINEURAL
Refills: 0 | Status: COMPLETED | OUTPATIENT
Start: 2020-08-19

## 2020-08-19 RX ORDER — METHYLPRED ACET/NACL,ISO-OS/PF 40 MG/ML
40 VIAL (ML) INJECTION
Qty: 1 | Refills: 0 | Status: COMPLETED | OUTPATIENT
Start: 2020-08-19

## 2020-08-19 RX ADMIN — METHYLPREDNISOLONE ACETATE MG/ML: 40 INJECTION, SUSPENSION INTRA-ARTICULAR; INTRALESIONAL; INTRAMUSCULAR; SOFT TISSUE at 00:00

## 2020-08-19 RX ADMIN — Medication %: at 00:00

## 2020-08-24 ENCOUNTER — RX CHANGE (OUTPATIENT)
Age: 85
End: 2020-08-24

## 2020-10-18 ENCOUNTER — INPATIENT (INPATIENT)
Facility: HOSPITAL | Age: 85
LOS: 2 days | Discharge: NURSING, REHAB OR LONG-TERM CARE | DRG: 552 | End: 2020-10-21
Attending: SURGERY | Admitting: SURGERY
Payer: COMMERCIAL

## 2020-10-18 VITALS
OXYGEN SATURATION: 98 % | HEART RATE: 74 BPM | SYSTOLIC BLOOD PRESSURE: 117 MMHG | DIASTOLIC BLOOD PRESSURE: 76 MMHG | HEIGHT: 58 IN | WEIGHT: 149.91 LBS | TEMPERATURE: 98 F | RESPIRATION RATE: 18 BRPM

## 2020-10-18 DIAGNOSIS — S32.9XXA FRACTURE OF UNSPECIFIED PARTS OF LUMBOSACRAL SPINE AND PELVIS, INITIAL ENCOUNTER FOR CLOSED FRACTURE: ICD-10-CM

## 2020-10-18 DIAGNOSIS — Z96.651 PRESENCE OF RIGHT ARTIFICIAL KNEE JOINT: Chronic | ICD-10-CM

## 2020-10-18 LAB
ALBUMIN SERPL ELPH-MCNC: 3.9 G/DL — SIGNIFICANT CHANGE UP (ref 3.3–5.2)
ALP SERPL-CCNC: 68 U/L — SIGNIFICANT CHANGE UP (ref 40–120)
ALT FLD-CCNC: 21 U/L — SIGNIFICANT CHANGE UP
ANION GAP SERPL CALC-SCNC: 13 MMOL/L — SIGNIFICANT CHANGE UP (ref 5–17)
ANISOCYTOSIS BLD QL: SLIGHT — SIGNIFICANT CHANGE UP
APTT BLD: 28.8 SEC — SIGNIFICANT CHANGE UP (ref 27.5–35.5)
AST SERPL-CCNC: 26 U/L — SIGNIFICANT CHANGE UP
BASOPHILS # BLD AUTO: 0 K/UL — SIGNIFICANT CHANGE UP (ref 0–0.2)
BASOPHILS NFR BLD AUTO: 0 % — SIGNIFICANT CHANGE UP (ref 0–2)
BILIRUB SERPL-MCNC: 0.6 MG/DL — SIGNIFICANT CHANGE UP (ref 0.4–2)
BLD GP AB SCN SERPL QL: SIGNIFICANT CHANGE UP
BUN SERPL-MCNC: 15 MG/DL — SIGNIFICANT CHANGE UP (ref 8–20)
CALCIUM SERPL-MCNC: 9.4 MG/DL — SIGNIFICANT CHANGE UP (ref 8.6–10.2)
CHLORIDE SERPL-SCNC: 101 MMOL/L — SIGNIFICANT CHANGE UP (ref 98–107)
CO2 SERPL-SCNC: 24 MMOL/L — SIGNIFICANT CHANGE UP (ref 22–29)
CREAT SERPL-MCNC: 0.53 MG/DL — SIGNIFICANT CHANGE UP (ref 0.5–1.3)
EOSINOPHIL # BLD AUTO: 0 K/UL — SIGNIFICANT CHANGE UP (ref 0–0.5)
EOSINOPHIL NFR BLD AUTO: 0 % — SIGNIFICANT CHANGE UP (ref 0–6)
GIANT PLATELETS BLD QL SMEAR: PRESENT — SIGNIFICANT CHANGE UP
GLUCOSE SERPL-MCNC: 120 MG/DL — HIGH (ref 70–99)
HCT VFR BLD CALC: 44.4 % — SIGNIFICANT CHANGE UP (ref 34.5–45)
HGB BLD-MCNC: 14.4 G/DL — SIGNIFICANT CHANGE UP (ref 11.5–15.5)
INR BLD: 1.19 RATIO — HIGH (ref 0.88–1.16)
LYMPHOCYTES # BLD AUTO: 0.36 K/UL — LOW (ref 1–3.3)
LYMPHOCYTES # BLD AUTO: 1.7 % — LOW (ref 13–44)
MACROCYTES BLD QL: SLIGHT — SIGNIFICANT CHANGE UP
MANUAL SMEAR VERIFICATION: SIGNIFICANT CHANGE UP
MCHC RBC-ENTMCNC: 30.2 PG — SIGNIFICANT CHANGE UP (ref 27–34)
MCHC RBC-ENTMCNC: 32.4 GM/DL — SIGNIFICANT CHANGE UP (ref 32–36)
MCV RBC AUTO: 93.1 FL — SIGNIFICANT CHANGE UP (ref 80–100)
MICROCYTES BLD QL: SLIGHT — SIGNIFICANT CHANGE UP
MONOCYTES # BLD AUTO: 1.65 K/UL — HIGH (ref 0–0.9)
MONOCYTES NFR BLD AUTO: 7.8 % — SIGNIFICANT CHANGE UP (ref 2–14)
NEUTROPHILS # BLD AUTO: 18.98 K/UL — HIGH (ref 1.8–7.4)
NEUTROPHILS NFR BLD AUTO: 89.6 % — HIGH (ref 43–77)
PLAT MORPH BLD: NORMAL — SIGNIFICANT CHANGE UP
PLATELET # BLD AUTO: 217 K/UL — SIGNIFICANT CHANGE UP (ref 150–400)
POLYCHROMASIA BLD QL SMEAR: SLIGHT — SIGNIFICANT CHANGE UP
POTASSIUM SERPL-MCNC: 4.6 MMOL/L — SIGNIFICANT CHANGE UP (ref 3.5–5.3)
POTASSIUM SERPL-SCNC: 4.6 MMOL/L — SIGNIFICANT CHANGE UP (ref 3.5–5.3)
PROT SERPL-MCNC: 6.6 G/DL — SIGNIFICANT CHANGE UP (ref 6.6–8.7)
PROTHROM AB SERPL-ACNC: 13.7 SEC — HIGH (ref 10.6–13.6)
RBC # BLD: 4.77 M/UL — SIGNIFICANT CHANGE UP (ref 3.8–5.2)
RBC # FLD: 13.1 % — SIGNIFICANT CHANGE UP (ref 10.3–14.5)
RBC BLD AUTO: ABNORMAL
SARS-COV-2 RNA SPEC QL NAA+PROBE: SIGNIFICANT CHANGE UP
SODIUM SERPL-SCNC: 138 MMOL/L — SIGNIFICANT CHANGE UP (ref 135–145)
VARIANT LYMPHS # BLD: 0.9 % — SIGNIFICANT CHANGE UP (ref 0–6)
WBC # BLD: 21.18 K/UL — HIGH (ref 3.8–10.5)
WBC # FLD AUTO: 21.18 K/UL — HIGH (ref 3.8–10.5)

## 2020-10-18 PROCEDURE — 71260 CT THORAX DX C+: CPT | Mod: 26

## 2020-10-18 PROCEDURE — 70450 CT HEAD/BRAIN W/O DYE: CPT | Mod: 26

## 2020-10-18 PROCEDURE — 99285 EMERGENCY DEPT VISIT HI MDM: CPT

## 2020-10-18 PROCEDURE — 99222 1ST HOSP IP/OBS MODERATE 55: CPT

## 2020-10-18 PROCEDURE — 73502 X-RAY EXAM HIP UNI 2-3 VIEWS: CPT | Mod: 26,LT

## 2020-10-18 PROCEDURE — 71046 X-RAY EXAM CHEST 2 VIEWS: CPT | Mod: 26

## 2020-10-18 PROCEDURE — 74177 CT ABD & PELVIS W/CONTRAST: CPT | Mod: 26

## 2020-10-18 RX ORDER — ACETAMINOPHEN 500 MG
975 TABLET ORAL ONCE
Refills: 0 | Status: COMPLETED | OUTPATIENT
Start: 2020-10-18 | End: 2020-10-18

## 2020-10-18 RX ORDER — ONDANSETRON 8 MG/1
4 TABLET, FILM COATED ORAL ONCE
Refills: 0 | Status: COMPLETED | OUTPATIENT
Start: 2020-10-18 | End: 2020-10-18

## 2020-10-18 RX ORDER — SIMVASTATIN 20 MG/1
20 TABLET, FILM COATED ORAL DAILY
Refills: 0 | Status: DISCONTINUED | OUTPATIENT
Start: 2020-10-18 | End: 2020-10-21

## 2020-10-18 RX ORDER — LIDOCAINE 4 G/100G
1 CREAM TOPICAL DAILY
Refills: 0 | Status: DISCONTINUED | OUTPATIENT
Start: 2020-10-18 | End: 2020-10-21

## 2020-10-18 RX ORDER — ENOXAPARIN SODIUM 100 MG/ML
40 INJECTION SUBCUTANEOUS DAILY
Refills: 0 | Status: DISCONTINUED | OUTPATIENT
Start: 2020-10-18 | End: 2020-10-21

## 2020-10-18 RX ORDER — LEVOTHYROXINE SODIUM 125 MCG
100 TABLET ORAL DAILY
Refills: 0 | Status: DISCONTINUED | OUTPATIENT
Start: 2020-10-18 | End: 2020-10-21

## 2020-10-18 RX ORDER — ACETAMINOPHEN 500 MG
650 TABLET ORAL EVERY 6 HOURS
Refills: 0 | Status: DISCONTINUED | OUTPATIENT
Start: 2020-10-18 | End: 2020-10-21

## 2020-10-18 RX ORDER — SODIUM CHLORIDE 9 MG/ML
1000 INJECTION INTRAMUSCULAR; INTRAVENOUS; SUBCUTANEOUS ONCE
Refills: 0 | Status: COMPLETED | OUTPATIENT
Start: 2020-10-18 | End: 2020-10-18

## 2020-10-18 RX ORDER — OXYCODONE HYDROCHLORIDE 5 MG/1
5 TABLET ORAL ONCE
Refills: 0 | Status: DISCONTINUED | OUTPATIENT
Start: 2020-10-18 | End: 2020-10-18

## 2020-10-18 RX ORDER — GABAPENTIN 400 MG/1
300 CAPSULE ORAL EVERY 8 HOURS
Refills: 0 | Status: DISCONTINUED | OUTPATIENT
Start: 2020-10-18 | End: 2020-10-21

## 2020-10-18 RX ADMIN — SODIUM CHLORIDE 1000 MILLILITER(S): 9 INJECTION INTRAMUSCULAR; INTRAVENOUS; SUBCUTANEOUS at 16:05

## 2020-10-18 RX ADMIN — SODIUM CHLORIDE 1000 MILLILITER(S): 9 INJECTION INTRAMUSCULAR; INTRAVENOUS; SUBCUTANEOUS at 17:41

## 2020-10-18 RX ADMIN — ONDANSETRON 4 MILLIGRAM(S): 8 TABLET, FILM COATED ORAL at 16:05

## 2020-10-18 RX ADMIN — OXYCODONE HYDROCHLORIDE 5 MILLIGRAM(S): 5 TABLET ORAL at 17:06

## 2020-10-18 RX ADMIN — LIDOCAINE 1 PATCH: 4 CREAM TOPICAL at 23:07

## 2020-10-18 RX ADMIN — Medication 975 MILLIGRAM(S): at 14:12

## 2020-10-18 RX ADMIN — OXYCODONE HYDROCHLORIDE 5 MILLIGRAM(S): 5 TABLET ORAL at 16:05

## 2020-10-18 RX ADMIN — Medication 650 MILLIGRAM(S): at 23:07

## 2020-10-18 RX ADMIN — Medication 975 MILLIGRAM(S): at 13:06

## 2020-10-18 RX ADMIN — GABAPENTIN 300 MILLIGRAM(S): 400 CAPSULE ORAL at 23:07

## 2020-10-18 NOTE — ED ADULT NURSE NOTE - CHIEF COMPLAINT QUOTE
Pt fell backwards while trying to get into vehicle leaving [employer], states she hit her head, denies LOC, denies blood thinners, AOx3, c/o pain to back of head and lower back, Dr Raines called for evaluation

## 2020-10-18 NOTE — ED PROVIDER NOTE - OBJECTIVE STATEMENT
88 yo female hx of htn and high cholesterol; no anticoagulation; was leaving [facility] and attempting to get into back of a group van for transport home, when van started to pull away, causing her to fall backwards; states she struck the back of her head, no LOC, and fell on left hip/buttock; difficulty getting up and bearing weight; all pain is worse with touch and ROM

## 2020-10-18 NOTE — H&P ADULT - NSICDXFAMHXPERTINENTNEGATIVE_GEN_A_CORE_FT
normal (ped)... In no apparent distress, appears well developed and well nourished. malignancies Distress 2/2 eye pain, non-toxic.

## 2020-10-18 NOTE — ED ADULT NURSE REASSESSMENT NOTE - NS ED NURSE REASSESS COMMENT FT1
patient received @ 1930, VSS, a&ox4, pt came in s/p fall. pt has complaints of R hip pain, pt has fx of hip. will continue to monitor.

## 2020-10-18 NOTE — ED ADULT TRIAGE NOTE - CHIEF COMPLAINT QUOTE
Pt fell backwards while trying to get into vehicle leaving [employer], states she hit her head, denies LOC, denies blood thinners, AOx3, c/o pain to back of head and lower back, Dr Raines called for evaluation

## 2020-10-18 NOTE — ED ADULT NURSE REASSESSMENT NOTE - NS ED NURSE REASSESS COMMENT FT1
PT in no apparent distress, denies any pain at the moment, plan of care explained to pt and son both verbalized understanding. Trauma team at bedside.

## 2020-10-18 NOTE — H&P ADULT - ASSESSMENT
A 88 yo F s/p fall from ground level. No LOC. CT head clear for intracranial hemorrhage or hematoma. Chest is clear, as well as abdomen. She has a stable pelvic fx in left side, consisting of left pubic rami fx. There is some extravasation of blood noticed in CT, which is read as hemorrhage / hematoma. Vitals and CBC are wnl.     - Admit to Trauma Service  - Pain control as needed  - PT eval  - Ortho consult   - Resume home meds with sips of water  - Resume diet as tolerated  - DVT ppx
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
## 2020-10-18 NOTE — H&P ADULT - NSHPPHYSICALEXAM_GEN_ALL_CORE
Primary: No acute, significant findings.     Secondary:  HEENT: L hematoma on occipital area. No external bleeding nor laceration No facial fractures. Eyes 3 mm reactive  Neck: no tenderness on midline  Chest: Non-labored, clear.   Abd: Soft, ND  Ext: pulses all throughout. Left hip hematoma, purple discoloration. Tender. LLE 3/5 strength. RLE 5/5. Sensation intact.
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
## 2020-10-18 NOTE — ED ADULT NURSE NOTE - NSIMPLEMENTINTERV_GEN_ALL_ED
Implemented All Fall with Harm Risk Interventions:  [place] to call system. Call bell, personal items and telephone within reach. Instruct patient to call for assistance. Room bathroom lighting operational. Non-slip footwear when patient is off stretcher. Physically safe environment: no spills, clutter or unnecessary equipment. Stretcher in lowest position, wheels locked, appropriate side rails in place. Provide visual cue, wrist band, yellow gown, etc. Monitor gait and stability. Monitor for mental status changes and reorient to person, place, and time. Review medications for side effects contributing to fall risk. Reinforce activity limits and safety measures with patient and family. Provide visual clues: red socks.

## 2020-10-19 ENCOUNTER — APPOINTMENT (OUTPATIENT)
Dept: DERMATOLOGY | Facility: CLINIC | Age: 85
End: 2020-10-19

## 2020-10-19 LAB
ANION GAP SERPL CALC-SCNC: 10 MMOL/L — SIGNIFICANT CHANGE UP (ref 5–17)
BASOPHILS # BLD AUTO: 0.05 K/UL — SIGNIFICANT CHANGE UP (ref 0–0.2)
BASOPHILS NFR BLD AUTO: 0.4 % — SIGNIFICANT CHANGE UP (ref 0–2)
BUN SERPL-MCNC: 20 MG/DL — SIGNIFICANT CHANGE UP (ref 8–20)
CALCIUM SERPL-MCNC: 8.4 MG/DL — LOW (ref 8.6–10.2)
CHLORIDE SERPL-SCNC: 102 MMOL/L — SIGNIFICANT CHANGE UP (ref 98–107)
CO2 SERPL-SCNC: 25 MMOL/L — SIGNIFICANT CHANGE UP (ref 22–29)
CREAT SERPL-MCNC: 0.85 MG/DL — SIGNIFICANT CHANGE UP (ref 0.5–1.3)
EOSINOPHIL # BLD AUTO: 0.08 K/UL — SIGNIFICANT CHANGE UP (ref 0–0.5)
EOSINOPHIL NFR BLD AUTO: 0.7 % — SIGNIFICANT CHANGE UP (ref 0–6)
GLUCOSE SERPL-MCNC: 111 MG/DL — HIGH (ref 70–99)
HCT VFR BLD CALC: 37.1 % — SIGNIFICANT CHANGE UP (ref 34.5–45)
HGB BLD-MCNC: 11.7 G/DL — SIGNIFICANT CHANGE UP (ref 11.5–15.5)
IMM GRANULOCYTES NFR BLD AUTO: 0.7 % — SIGNIFICANT CHANGE UP (ref 0–1.5)
LYMPHOCYTES # BLD AUTO: 0.98 K/UL — LOW (ref 1–3.3)
LYMPHOCYTES # BLD AUTO: 8 % — LOW (ref 13–44)
MCHC RBC-ENTMCNC: 29.8 PG — SIGNIFICANT CHANGE UP (ref 27–34)
MCHC RBC-ENTMCNC: 31.5 GM/DL — LOW (ref 32–36)
MCV RBC AUTO: 94.6 FL — SIGNIFICANT CHANGE UP (ref 80–100)
MONOCYTES # BLD AUTO: 1.85 K/UL — HIGH (ref 0–0.9)
MONOCYTES NFR BLD AUTO: 15.1 % — HIGH (ref 2–14)
NEUTROPHILS # BLD AUTO: 9.21 K/UL — HIGH (ref 1.8–7.4)
NEUTROPHILS NFR BLD AUTO: 75.1 % — SIGNIFICANT CHANGE UP (ref 43–77)
PLATELET # BLD AUTO: 190 K/UL — SIGNIFICANT CHANGE UP (ref 150–400)
POTASSIUM SERPL-MCNC: 4.5 MMOL/L — SIGNIFICANT CHANGE UP (ref 3.5–5.3)
POTASSIUM SERPL-SCNC: 4.5 MMOL/L — SIGNIFICANT CHANGE UP (ref 3.5–5.3)
RBC # BLD: 3.92 M/UL — SIGNIFICANT CHANGE UP (ref 3.8–5.2)
RBC # FLD: 13.6 % — SIGNIFICANT CHANGE UP (ref 10.3–14.5)
SODIUM SERPL-SCNC: 137 MMOL/L — SIGNIFICANT CHANGE UP (ref 135–145)
TSH SERPL-MCNC: 2.56 UIU/ML — SIGNIFICANT CHANGE UP (ref 0.27–4.2)
WBC # BLD: 12.25 K/UL — HIGH (ref 3.8–10.5)
WBC # FLD AUTO: 12.25 K/UL — HIGH (ref 3.8–10.5)

## 2020-10-19 PROCEDURE — 99232 SBSQ HOSP IP/OBS MODERATE 35: CPT | Mod: GC

## 2020-10-19 PROCEDURE — 71045 X-RAY EXAM CHEST 1 VIEW: CPT | Mod: 26

## 2020-10-19 RX ORDER — CELECOXIB 200 MG/1
200 CAPSULE ORAL DAILY
Refills: 0 | Status: DISCONTINUED | OUTPATIENT
Start: 2020-10-19 | End: 2020-10-21

## 2020-10-19 RX ORDER — HYDROMORPHONE HYDROCHLORIDE 2 MG/ML
0.5 INJECTION INTRAMUSCULAR; INTRAVENOUS; SUBCUTANEOUS ONCE
Refills: 0 | Status: DISCONTINUED | OUTPATIENT
Start: 2020-10-19 | End: 2020-10-19

## 2020-10-19 RX ORDER — TRAMADOL HYDROCHLORIDE 50 MG/1
50 TABLET ORAL EVERY 6 HOURS
Refills: 0 | Status: DISCONTINUED | OUTPATIENT
Start: 2020-10-19 | End: 2020-10-21

## 2020-10-19 RX ORDER — SODIUM CHLORIDE 9 MG/ML
1000 INJECTION, SOLUTION INTRAVENOUS
Refills: 0 | Status: DISCONTINUED | OUTPATIENT
Start: 2020-10-19 | End: 2020-10-20

## 2020-10-19 RX ORDER — OXYCODONE HYDROCHLORIDE 5 MG/1
10 TABLET ORAL EVERY 4 HOURS
Refills: 0 | Status: DISCONTINUED | OUTPATIENT
Start: 2020-10-19 | End: 2020-10-19

## 2020-10-19 RX ORDER — OXYCODONE HYDROCHLORIDE 5 MG/1
5 TABLET ORAL EVERY 4 HOURS
Refills: 0 | Status: DISCONTINUED | OUTPATIENT
Start: 2020-10-19 | End: 2020-10-19

## 2020-10-19 RX ADMIN — LIDOCAINE 1 PATCH: 4 CREAM TOPICAL at 12:20

## 2020-10-19 RX ADMIN — Medication 650 MILLIGRAM(S): at 00:07

## 2020-10-19 RX ADMIN — HYDROMORPHONE HYDROCHLORIDE 0.5 MILLIGRAM(S): 2 INJECTION INTRAMUSCULAR; INTRAVENOUS; SUBCUTANEOUS at 02:25

## 2020-10-19 RX ADMIN — HYDROMORPHONE HYDROCHLORIDE 0.5 MILLIGRAM(S): 2 INJECTION INTRAMUSCULAR; INTRAVENOUS; SUBCUTANEOUS at 02:44

## 2020-10-19 RX ADMIN — Medication 650 MILLIGRAM(S): at 13:14

## 2020-10-19 RX ADMIN — LIDOCAINE 1 PATCH: 4 CREAM TOPICAL at 23:31

## 2020-10-19 RX ADMIN — GABAPENTIN 300 MILLIGRAM(S): 400 CAPSULE ORAL at 05:45

## 2020-10-19 RX ADMIN — LIDOCAINE 1 PATCH: 4 CREAM TOPICAL at 12:22

## 2020-10-19 RX ADMIN — Medication 650 MILLIGRAM(S): at 12:22

## 2020-10-19 RX ADMIN — Medication 650 MILLIGRAM(S): at 05:45

## 2020-10-19 RX ADMIN — Medication 650 MILLIGRAM(S): at 06:47

## 2020-10-19 RX ADMIN — LIDOCAINE 1 PATCH: 4 CREAM TOPICAL at 07:21

## 2020-10-19 RX ADMIN — Medication 650 MILLIGRAM(S): at 23:32

## 2020-10-19 RX ADMIN — SIMVASTATIN 20 MILLIGRAM(S): 20 TABLET, FILM COATED ORAL at 21:49

## 2020-10-19 RX ADMIN — GABAPENTIN 300 MILLIGRAM(S): 400 CAPSULE ORAL at 21:49

## 2020-10-19 RX ADMIN — SODIUM CHLORIDE 75 MILLILITER(S): 9 INJECTION, SOLUTION INTRAVENOUS at 21:49

## 2020-10-19 RX ADMIN — Medication 100 MICROGRAM(S): at 05:46

## 2020-10-19 RX ADMIN — LIDOCAINE 1 PATCH: 4 CREAM TOPICAL at 21:47

## 2020-10-19 RX ADMIN — GABAPENTIN 300 MILLIGRAM(S): 400 CAPSULE ORAL at 13:15

## 2020-10-19 RX ADMIN — SODIUM CHLORIDE 75 MILLILITER(S): 9 INJECTION, SOLUTION INTRAVENOUS at 03:30

## 2020-10-19 RX ADMIN — TRAMADOL HYDROCHLORIDE 50 MILLIGRAM(S): 50 TABLET ORAL at 16:22

## 2020-10-19 RX ADMIN — ENOXAPARIN SODIUM 40 MILLIGRAM(S): 100 INJECTION SUBCUTANEOUS at 12:22

## 2020-10-19 RX ADMIN — TRAMADOL HYDROCHLORIDE 50 MILLIGRAM(S): 50 TABLET ORAL at 17:38

## 2020-10-19 NOTE — PROGRESS NOTE ADULT - SUBJECTIVE AND OBJECTIVE BOX
HPI/OVERNIGHT EVENTS: Patient had hypotensive SBP and desat overnight requiring supplemental O2. Responded well to interventions of IVF and NC O2. Patient notes improvement in hip pain since admission. No other complaints. Denies f/c/n/v/d, chest pain, SOB    MEDICATIONS  (STANDING):  acetaminophen   Tablet .. 650 milliGRAM(s) Oral every 6 hours  enoxaparin Injectable 40 milliGRAM(s) SubCutaneous daily  gabapentin 300 milliGRAM(s) Oral every 8 hours  lactated ringers. 1000 milliLiter(s) (75 mL/Hr) IV Continuous <Continuous>  levothyroxine  Oral Tab/Cap - Peds 100 MICROGram(s) Oral daily  lidocaine   Patch 1 Patch Transdermal daily  simvastatin Oral Tab/Cap - Peds 20 milliGRAM(s) Oral daily    MEDICATIONS  (PRN):  oxyCODONE    IR 5 milliGRAM(s) Oral every 4 hours PRN Moderate Pain (4 - 6)  oxyCODONE    IR 10 milliGRAM(s) Oral every 4 hours PRN Severe Pain (7 - 10)      Vital Signs Last 24 Hrs  T(C): 36.8 (19 Oct 2020 04:26), Max: 37.1 (19 Oct 2020 03:16)  T(F): 98.3 (19 Oct 2020 04:26), Max: 98.7 (19 Oct 2020 03:16)  HR: 77 (19 Oct 2020 04:26) (74 - 100)  BP: 92/54 (19 Oct 2020 03:16) (92/54 - 117/76)  RR: 18 (19 Oct 2020 04:26) (18 - 20)  SpO2: 95% (19 Oct 2020 04:26) (94% - 98%)    Gen: A&Ox3, NAD. Laying in bed  HEENT: NCAT. PERRLA  Pulm: CTAB  CV: RRR  Abd: Soft, NTND  Neuro: GCS: 15 (4/5/6). A&O x 3; no gross sensory / motor / coordination deficits  Ext: Extremities warm, well-perfused. Palpable radial pulses b/l. Tenderness in region of left hip      I&O's Detail    18 Oct 2020 07:01  -  19 Oct 2020 04:28  --------------------------------------------------------  IN:    Lactated Ringers: 75 mL    Oral Fluid: 400 mL  Total IN: 475 mL    OUT:  Total OUT: 0 mL    Total NET: 475 mL          LABS:                        14.4   21.18 )-----------( 217      ( 18 Oct 2020 12:58 )             44.4     10-18    138  |  101  |  15.0  ----------------------------<  120<H>  4.6   |  24.0  |  0.53    Ca    9.4      18 Oct 2020 12:58    TPro  6.6  /  Alb  3.9  /  TBili  0.6  /  DBili  x   /  AST  26  /  ALT  21  /  AlkPhos  68  10-18    PT/INR - ( 18 Oct 2020 12:58 )   PT: 13.7 sec;   INR: 1.19 ratio         PTT - ( 18 Oct 2020 12:58 )  PTT:28.8 sec
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
## 2020-10-19 NOTE — PHYSICAL THERAPY INITIAL EVALUATION ADULT - ADDITIONAL COMMENTS
Pt lives in a house with a  ramp to enter and no stairs inside.  Pt owns medical equipment: None   Pt lives with: Son and daughter in law, daughter in law is a nurse, currently not working  Someone is always available to provide assist.

## 2020-10-19 NOTE — PHYSICAL THERAPY INITIAL EVALUATION ADULT - ACTIVE RANGE OF MOTION EXAMINATION, REHAB EVAL
bilateral upper extremity Active ROM was WFL (within functional limits)/deficits as listed below/bilateral  lower extremity Active ROM was WFL (within functional limits)/left LE hip AROM grossly 2/5 supine due to pain,

## 2020-10-19 NOTE — CHART NOTE - NSCHARTNOTEFT_GEN_A_CORE
Contacted by nursing staff for hypotensive systolic pressures recorded in 60s-70s, in addition to oxygen desaturation requiring supplemental O2. On arrival at bedside, patient stated that she felt okay, although recent pressures were below her usual baseline (and below those recorded in ED). She denied any prior episodes of low blood pressure in the past, and she denied any feelings of lightheadedness or presyncope.    On exam, patient did not appear in any acute distress. A manual BP obtained a blood pressure of 82/50; automated cuff subsequently obtained a systolic BP of about 94. Patient had strong, palpable radial pulses bilaterally. Lungs were clear to auscultation bilaterally, and patient was not tachycardic. Patient had received dose of hydromorphone shortly before reported blood pressure. Blood pressure likely represents transient response to narcotic pain medication; patient is otherwise hemodynamically stable.    However, given soft pressures, will plan to start patient on IV fluids through the AM. Will also keep nasal cannula for oxygen, titrating as needed to keep oxygen saturation above 90%.

## 2020-10-20 ENCOUNTER — TRANSCRIPTION ENCOUNTER (OUTPATIENT)
Age: 85
End: 2020-10-20

## 2020-10-20 DIAGNOSIS — S32.9XXA FRACTURE OF UNSPECIFIED PARTS OF LUMBOSACRAL SPINE AND PELVIS, INITIAL ENCOUNTER FOR CLOSED FRACTURE: ICD-10-CM

## 2020-10-20 LAB
ANION GAP SERPL CALC-SCNC: 10 MMOL/L — SIGNIFICANT CHANGE UP (ref 5–17)
BASOPHILS # BLD AUTO: 0.05 K/UL — SIGNIFICANT CHANGE UP (ref 0–0.2)
BASOPHILS NFR BLD AUTO: 0.5 % — SIGNIFICANT CHANGE UP (ref 0–2)
BUN SERPL-MCNC: 12 MG/DL — SIGNIFICANT CHANGE UP (ref 8–20)
CALCIUM SERPL-MCNC: 8.2 MG/DL — LOW (ref 8.6–10.2)
CHLORIDE SERPL-SCNC: 105 MMOL/L — SIGNIFICANT CHANGE UP (ref 98–107)
CO2 SERPL-SCNC: 24 MMOL/L — SIGNIFICANT CHANGE UP (ref 22–29)
CREAT SERPL-MCNC: 0.43 MG/DL — LOW (ref 0.5–1.3)
EOSINOPHIL # BLD AUTO: 0.17 K/UL — SIGNIFICANT CHANGE UP (ref 0–0.5)
EOSINOPHIL NFR BLD AUTO: 1.5 % — SIGNIFICANT CHANGE UP (ref 0–6)
GLUCOSE SERPL-MCNC: 112 MG/DL — HIGH (ref 70–99)
HCT VFR BLD CALC: 33.2 % — LOW (ref 34.5–45)
HGB BLD-MCNC: 10.4 G/DL — LOW (ref 11.5–15.5)
IMM GRANULOCYTES NFR BLD AUTO: 0.7 % — SIGNIFICANT CHANGE UP (ref 0–1.5)
LYMPHOCYTES # BLD AUTO: 1.4 K/UL — SIGNIFICANT CHANGE UP (ref 1–3.3)
LYMPHOCYTES # BLD AUTO: 12.7 % — LOW (ref 13–44)
MCHC RBC-ENTMCNC: 29.6 PG — SIGNIFICANT CHANGE UP (ref 27–34)
MCHC RBC-ENTMCNC: 31.3 GM/DL — LOW (ref 32–36)
MCV RBC AUTO: 94.6 FL — SIGNIFICANT CHANGE UP (ref 80–100)
MONOCYTES # BLD AUTO: 1.73 K/UL — HIGH (ref 0–0.9)
MONOCYTES NFR BLD AUTO: 15.7 % — HIGH (ref 2–14)
NEUTROPHILS # BLD AUTO: 7.56 K/UL — HIGH (ref 1.8–7.4)
NEUTROPHILS NFR BLD AUTO: 68.9 % — SIGNIFICANT CHANGE UP (ref 43–77)
PLATELET # BLD AUTO: 159 K/UL — SIGNIFICANT CHANGE UP (ref 150–400)
POTASSIUM SERPL-MCNC: 4.3 MMOL/L — SIGNIFICANT CHANGE UP (ref 3.5–5.3)
POTASSIUM SERPL-SCNC: 4.3 MMOL/L — SIGNIFICANT CHANGE UP (ref 3.5–5.3)
RBC # BLD: 3.51 M/UL — LOW (ref 3.8–5.2)
RBC # FLD: 13.7 % — SIGNIFICANT CHANGE UP (ref 10.3–14.5)
SARS-COV-2 IGG SERPL QL IA: NEGATIVE — SIGNIFICANT CHANGE UP
SARS-COV-2 IGM SERPL IA-ACNC: <0.1 INDEX — SIGNIFICANT CHANGE UP
SODIUM SERPL-SCNC: 139 MMOL/L — SIGNIFICANT CHANGE UP (ref 135–145)
WBC # BLD: 10.99 K/UL — HIGH (ref 3.8–10.5)
WBC # FLD AUTO: 10.99 K/UL — HIGH (ref 3.8–10.5)

## 2020-10-20 PROCEDURE — 99231 SBSQ HOSP IP/OBS SF/LOW 25: CPT

## 2020-10-20 RX ORDER — LACTULOSE 10 G/15ML
30 SOLUTION ORAL ONCE
Refills: 0 | Status: COMPLETED | OUTPATIENT
Start: 2020-10-20 | End: 2020-10-20

## 2020-10-20 RX ORDER — CELECOXIB 200 MG/1
1 CAPSULE ORAL
Qty: 0 | Refills: 0 | DISCHARGE
Start: 2020-10-20

## 2020-10-20 RX ORDER — TRAMADOL HYDROCHLORIDE 50 MG/1
1 TABLET ORAL
Qty: 0 | Refills: 0 | DISCHARGE
Start: 2020-10-20

## 2020-10-20 RX ORDER — POLYETHYLENE GLYCOL 3350 17 G/17G
17 POWDER, FOR SOLUTION ORAL
Qty: 0 | Refills: 0 | DISCHARGE
Start: 2020-10-20

## 2020-10-20 RX ORDER — SENNA PLUS 8.6 MG/1
2 TABLET ORAL
Qty: 0 | Refills: 0 | DISCHARGE
Start: 2020-10-20

## 2020-10-20 RX ORDER — POLYETHYLENE GLYCOL 3350 17 G/17G
17 POWDER, FOR SOLUTION ORAL
Refills: 0 | Status: DISCONTINUED | OUTPATIENT
Start: 2020-10-20 | End: 2020-10-21

## 2020-10-20 RX ORDER — GABAPENTIN 400 MG/1
1 CAPSULE ORAL
Qty: 0 | Refills: 0 | DISCHARGE
Start: 2020-10-20

## 2020-10-20 RX ORDER — SENNA PLUS 8.6 MG/1
2 TABLET ORAL AT BEDTIME
Refills: 0 | Status: DISCONTINUED | OUTPATIENT
Start: 2020-10-20 | End: 2020-10-21

## 2020-10-20 RX ADMIN — ENOXAPARIN SODIUM 40 MILLIGRAM(S): 100 INJECTION SUBCUTANEOUS at 11:52

## 2020-10-20 RX ADMIN — Medication 650 MILLIGRAM(S): at 23:20

## 2020-10-20 RX ADMIN — Medication 650 MILLIGRAM(S): at 06:01

## 2020-10-20 RX ADMIN — CELECOXIB 200 MILLIGRAM(S): 200 CAPSULE ORAL at 12:40

## 2020-10-20 RX ADMIN — LIDOCAINE 1 PATCH: 4 CREAM TOPICAL at 11:52

## 2020-10-20 RX ADMIN — GABAPENTIN 300 MILLIGRAM(S): 400 CAPSULE ORAL at 13:19

## 2020-10-20 RX ADMIN — Medication 650 MILLIGRAM(S): at 05:01

## 2020-10-20 RX ADMIN — LACTULOSE 30 GRAM(S): 10 SOLUTION ORAL at 11:53

## 2020-10-20 RX ADMIN — Medication 650 MILLIGRAM(S): at 11:52

## 2020-10-20 RX ADMIN — GABAPENTIN 300 MILLIGRAM(S): 400 CAPSULE ORAL at 21:34

## 2020-10-20 RX ADMIN — Medication 650 MILLIGRAM(S): at 12:40

## 2020-10-20 RX ADMIN — Medication 100 MICROGRAM(S): at 05:02

## 2020-10-20 RX ADMIN — CELECOXIB 200 MILLIGRAM(S): 200 CAPSULE ORAL at 11:52

## 2020-10-20 RX ADMIN — LIDOCAINE 1 PATCH: 4 CREAM TOPICAL at 19:50

## 2020-10-20 RX ADMIN — SENNA PLUS 2 TABLET(S): 8.6 TABLET ORAL at 23:20

## 2020-10-20 RX ADMIN — GABAPENTIN 300 MILLIGRAM(S): 400 CAPSULE ORAL at 05:02

## 2020-10-20 RX ADMIN — SIMVASTATIN 20 MILLIGRAM(S): 20 TABLET, FILM COATED ORAL at 21:34

## 2020-10-20 RX ADMIN — Medication 650 MILLIGRAM(S): at 00:32

## 2020-10-20 NOTE — PROGRESS NOTE ADULT - PROBLEM SELECTOR PLAN 1
wbat  PT/OT  pain control  dvt ppx  encourage good po intake  IV locked   oob as much as possible  incentive spirometer  once medically stable can likely be discharged home with home assist/pt

## 2020-10-20 NOTE — DISCHARGE NOTE PROVIDER - CARE PROVIDER_API CALL
Farhan Prakash (DO)  Orthopaedic Surgery  [address] [place] [address]  Phone: [phone]  Fax: [phone]  Follow Up Time: 2 weeks

## 2020-10-20 NOTE — PROGRESS NOTE ADULT - SUBJECTIVE AND OBJECTIVE BOX
SUBJECTIVE/24 hour events:  Patient is a 87yFemale s/p fall sustaining non-op left superior+ inferior pubic rami fx and left sacral ala fx. Patient had no acute events overnight, no complaints, pain controlled with current regimen, tolerating a diet, IV locked, participating with PT. Patient when medically ready should be able to go home with home assist and home PT       Vital Signs Last 24 Hrs  T(C): 36.5 (20 Oct 2020 02:05), Max: 36.8 (19 Oct 2020 04:26)  T(F): 97.7 (20 Oct 2020 02:05), Max: 98.3 (19 Oct 2020 04:26)  HR: 90 (20 Oct 2020 02:21) (75 - 100)  BP: 120/70 (20 Oct 2020 02:21) (101/68 - 145/81)  BP(mean): --  RR: 18 (20 Oct 2020 02:21) (18 - 19)  SpO2: 94% (20 Oct 2020 02:21) (90% - 98%)  Drug Dosing Weight  Height (cm): 147.3 (19 Oct 2020 00:15)  Weight (kg): 70.2 (19 Oct 2020 00:15)  BMI (kg/m2): 32.4 (19 Oct 2020 00:15)  BSA (m2): 1.63 (19 Oct 2020 00:15)  I&O's Detail    18 Oct 2020 07:01  -  19 Oct 2020 07:00  --------------------------------------------------------  IN:    Lactated Ringers: 150 mL    Oral Fluid: 400 mL  Total IN: 550 mL    OUT:  Total OUT: 0 mL    Total NET: 550 mL      19 Oct 2020 07:01  -  20 Oct 2020 04:10  --------------------------------------------------------  IN:    Lactated Ringers: 1275 mL    Oral Fluid: 200 mL  Total IN: 1475 mL    OUT:    Voided (mL): 500 mL  Total OUT: 500 mL    Total NET: 975 mL        Allergies    No Known Allergies    Intolerances                              11.7   12.25 )-----------( 190      ( 19 Oct 2020 06:43 )             37.1   10-19    137  |  102  |  20.0  ----------------------------<  111<H>  4.5   |  25.0  |  0.85    Ca    8.4<L>      19 Oct 2020 06:43    TPro  6.6  /  Alb  3.9  /  TBili  0.6  /  DBili  x   /  AST  26  /  ALT  21  /  AlkPhos  68  10-18  PT/INR - ( 18 Oct 2020 12:58 )   PT: 13.7 sec;   INR: 1.19 ratio         PTT - ( 18 Oct 2020 12:58 )  PTT:28.8 sec    ROS:    PHYSICAL EXAM:  Constitutional: " Im getting better, I did get out of bed to the chair to eat breakfast"     Respiratory: no respiratory distress, no conversational dyspnea, supplemental o2 via nasal canula 2 liters      Cardiovascular: NSR    Gastrointestinal: abdomen soft, non-tender, atraumatic     Genitourinary: prima fit in place draining urine    Extremities: Bilateral upper and lower extremities NVI    Neurological:   A&Ox3    Skin: warm, dry and no rashes           MEDICATIONS  (STANDING):  acetaminophen   Tablet .. 650 milliGRAM(s) Oral every 6 hours  celecoxib 200 milliGRAM(s) Oral daily  enoxaparin Injectable 40 milliGRAM(s) SubCutaneous daily  gabapentin 300 milliGRAM(s) Oral every 8 hours  lactated ringers. 1000 milliLiter(s) (75 mL/Hr) IV Continuous <Continuous>  levothyroxine  Oral Tab/Cap - Peds 100 MICROGram(s) Oral daily  lidocaine   Patch 1 Patch Transdermal daily  simvastatin Oral Tab/Cap - Peds 20 milliGRAM(s) Oral daily    MEDICATIONS  (PRN):  traMADol 50 milliGRAM(s) Oral every 6 hours PRN Moderate Pain (4 - 6)      RADIOLOGY STUDIES:    CULTURES:

## 2020-10-20 NOTE — DISCHARGE NOTE PROVIDER - NSDCMRMEDTOKEN_GEN_ALL_CORE_FT
alendronate:  orally   brimonidine ophthalmic 0.2% ophthalmic solution:  to each affected eye   folic acid:  orally    mg oral tablet: 1 tab(s) orally 4 times a day  Keflex 500 mg oral capsule: 1 cap(s) orally 2 times a day  levothyroxine:  orally   ondansetron 4 mg oral tablet: 1 tab(s) orally every 6 hours  simvastatin:  orally   Travatan Z 0.004% ophthalmic solution:  to each affected eye   Tylenol Caplet 325 mg oral tablet: 3 tab(s) orally every 4 hours   alendronate:  orally   brimonidine ophthalmic 0.2% ophthalmic solution:  to each affected eye   celecoxib 200 mg oral capsule: 1 cap(s) orally once a day  folic acid:  orally   gabapentin 300 mg oral capsule: 1 cap(s) orally every 8 hours  levothyroxine:  orally   ondansetron 4 mg oral tablet: 1 tab(s) orally every 6 hours  polyethylene glycol 3350 oral powder for reconstitution: 17 gram(s) orally 2 times a day, As needed, Constipation  senna oral tablet: 2 tab(s) orally once a day (at bedtime)  simvastatin:  orally   traMADol 50 mg oral tablet: 1 tab(s) orally every 6 hours, As needed, Moderate Pain (4 - 6)  Travatan Z 0.004% ophthalmic solution:  to each affected eye   Tylenol Caplet 325 mg oral tablet: 3 tab(s) orally every 4 hours
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
## 2020-10-21 ENCOUNTER — TRANSCRIPTION ENCOUNTER (OUTPATIENT)
Age: 85
End: 2020-10-21

## 2020-10-21 VITALS
OXYGEN SATURATION: 93 % | DIASTOLIC BLOOD PRESSURE: 87 MMHG | RESPIRATION RATE: 18 BRPM | SYSTOLIC BLOOD PRESSURE: 151 MMHG | TEMPERATURE: 98 F | HEART RATE: 88 BPM

## 2020-10-21 PROCEDURE — 84443 ASSAY THYROID STIM HORMONE: CPT

## 2020-10-21 PROCEDURE — 74177 CT ABD & PELVIS W/CONTRAST: CPT

## 2020-10-21 PROCEDURE — 80053 COMPREHEN METABOLIC PANEL: CPT

## 2020-10-21 PROCEDURE — 71046 X-RAY EXAM CHEST 2 VIEWS: CPT

## 2020-10-21 PROCEDURE — 71260 CT THORAX DX C+: CPT

## 2020-10-21 PROCEDURE — 86769 SARS-COV-2 COVID-19 ANTIBODY: CPT

## 2020-10-21 PROCEDURE — 72170 X-RAY EXAM OF PELVIS: CPT

## 2020-10-21 PROCEDURE — 85730 THROMBOPLASTIN TIME PARTIAL: CPT

## 2020-10-21 PROCEDURE — 86850 RBC ANTIBODY SCREEN: CPT

## 2020-10-21 PROCEDURE — 80048 BASIC METABOLIC PNL TOTAL CA: CPT

## 2020-10-21 PROCEDURE — 99232 SBSQ HOSP IP/OBS MODERATE 35: CPT

## 2020-10-21 PROCEDURE — 85610 PROTHROMBIN TIME: CPT

## 2020-10-21 PROCEDURE — 85025 COMPLETE CBC W/AUTO DIFF WBC: CPT

## 2020-10-21 PROCEDURE — 99284 EMERGENCY DEPT VISIT MOD MDM: CPT

## 2020-10-21 PROCEDURE — 86900 BLOOD TYPING SEROLOGIC ABO: CPT

## 2020-10-21 PROCEDURE — 86901 BLOOD TYPING SEROLOGIC RH(D): CPT

## 2020-10-21 PROCEDURE — 73502 X-RAY EXAM HIP UNI 2-3 VIEWS: CPT

## 2020-10-21 PROCEDURE — 36415 COLL VENOUS BLD VENIPUNCTURE: CPT

## 2020-10-21 PROCEDURE — 70450 CT HEAD/BRAIN W/O DYE: CPT

## 2020-10-21 PROCEDURE — 97163 PT EVAL HIGH COMPLEX 45 MIN: CPT

## 2020-10-21 PROCEDURE — 87635 SARS-COV-2 COVID-19 AMP PRB: CPT

## 2020-10-21 PROCEDURE — 71045 X-RAY EXAM CHEST 1 VIEW: CPT

## 2020-10-21 RX ORDER — LACTULOSE 10 G/15ML
20 SOLUTION ORAL ONCE
Refills: 0 | Status: COMPLETED | OUTPATIENT
Start: 2020-10-21 | End: 2020-10-21

## 2020-10-21 RX ADMIN — GABAPENTIN 300 MILLIGRAM(S): 400 CAPSULE ORAL at 05:17

## 2020-10-21 RX ADMIN — LIDOCAINE 1 PATCH: 4 CREAM TOPICAL at 11:45

## 2020-10-21 RX ADMIN — LACTULOSE 20 GRAM(S): 10 SOLUTION ORAL at 11:44

## 2020-10-21 RX ADMIN — Medication 650 MILLIGRAM(S): at 12:40

## 2020-10-21 RX ADMIN — Medication 650 MILLIGRAM(S): at 05:17

## 2020-10-21 RX ADMIN — Medication 650 MILLIGRAM(S): at 00:40

## 2020-10-21 RX ADMIN — Medication 650 MILLIGRAM(S): at 05:41

## 2020-10-21 RX ADMIN — GABAPENTIN 300 MILLIGRAM(S): 400 CAPSULE ORAL at 14:44

## 2020-10-21 RX ADMIN — CELECOXIB 200 MILLIGRAM(S): 200 CAPSULE ORAL at 12:40

## 2020-10-21 RX ADMIN — Medication 650 MILLIGRAM(S): at 11:44

## 2020-10-21 RX ADMIN — LIDOCAINE 1 PATCH: 4 CREAM TOPICAL at 00:40

## 2020-10-21 RX ADMIN — CELECOXIB 200 MILLIGRAM(S): 200 CAPSULE ORAL at 11:44

## 2020-10-21 RX ADMIN — Medication 100 MICROGRAM(S): at 05:17

## 2020-10-21 RX ADMIN — ENOXAPARIN SODIUM 40 MILLIGRAM(S): 100 INJECTION SUBCUTANEOUS at 11:45

## 2020-10-21 NOTE — PROGRESS NOTE ADULT - PROBLEM SELECTOR PLAN 1
wbat  PT/OT  pain control  dvt ppx  encourage good po intake  IV locked   oob as much as possible  incentive spirometer  once medically stable can likely be discharged home with home assist/pt.

## 2020-10-21 NOTE — DISCHARGE NOTE NURSING/CASE MANAGEMENT/SOCIAL WORK - PATIENT PORTAL LINK FT
You can access the FollowMyHealth Patient Portal offered by [facility] by registering at the following website: [email or web address]. By joining "[employer],"’s FollowMyHealth portal, you will also be able to view your health information using other applications (apps) compatible with our system.

## 2020-10-21 NOTE — PROGRESS NOTE ADULT - ATTENDING COMMENTS
The patient was seen and examined  Events noted  No new problems  Awake and alert  No dyspnea  Will obtain CXR today  DVT prophylaxis  Avoid narcotics  Physical therapy
I have seen and examined the patient  no new issues  PT  lovenox  dispo planning
The patient was seen and examined  Events noted  No new problems  B/L LE N/V intact, no edema  Constipation  DVT prophylaxis  Cathartic  Discharge planning

## 2020-10-21 NOTE — PROGRESS NOTE ADULT - SUBJECTIVE AND OBJECTIVE BOX
SUBJECTIVE/24 hour events: Patient is a 87yFemale s/p fall sustaining non-op left superior+ inferior pubic rami fx and left sacral ala fx. Patient had no acute events overnight, no complaints, pain controlled with current regimen, tolerating a diet, IV locked. Patient in good spirits, improving daily with PT, was able to ambulate with assistance to around room Patient when medically ready should be able to go home with home assist and home PT         Vital Signs Last 24 Hrs  T(C): 36.8 (21 Oct 2020 00:04), Max: 36.8 (20 Oct 2020 08:00)  T(F): 98.2 (21 Oct 2020 00:04), Max: 98.3 (20 Oct 2020 08:00)  HR: 97 (21 Oct 2020 00:04) (88 - 97)  BP: 111/65 (21 Oct 2020 00:04) (103/64 - 124/71)  BP(mean): --  RR: 17 (21 Oct 2020 00:04) (17 - 19)  SpO2: 92% (21 Oct 2020 00:04) (90% - 94%)  Drug Dosing Weight  Height (cm): 147.3 (19 Oct 2020 00:15)  Weight (kg): 70.2 (19 Oct 2020 00:15)  BMI (kg/m2): 32.4 (19 Oct 2020 00:15)  BSA (m2): 1.63 (19 Oct 2020 00:15)  I&O's Detail    19 Oct 2020 07:01  -  20 Oct 2020 07:00  --------------------------------------------------------  IN:    Lactated Ringers: 1500 mL    Oral Fluid: 200 mL  Total IN: 1700 mL    OUT:    Voided (mL): 900 mL  Total OUT: 900 mL    Total NET: 800 mL        Allergies    No Known Allergies    Intolerances                              10.4   10.99 )-----------( 159      ( 20 Oct 2020 07:16 )             33.2   10-20    139  |  105  |  12.0  ----------------------------<  112<H>  4.3   |  24.0  |  0.43<L>    Ca    8.2<L>      20 Oct 2020 07:16        ROS:    PHYSICAL EXAM:  Constitutional: " I had a pretty good day"    Respiratory: no respiratory distress, no conversational dyspnea, no supplemental o2 needed at this time       Cardiovascular: NSR    Gastrointestinal: abdomen soft, non-tender, atraumatic     Genitourinary: prima fit in place draining urine    Extremities: Bilateral upper and lower extremities NVI    Neurological:   A&Ox3, GCS 15    Skin: warm, dry and no rashes     MEDICATIONS  (STANDING):  acetaminophen   Tablet .. 650 milliGRAM(s) Oral every 6 hours  celecoxib 200 milliGRAM(s) Oral daily  enoxaparin Injectable 40 milliGRAM(s) SubCutaneous daily  gabapentin 300 milliGRAM(s) Oral every 8 hours  levothyroxine  Oral Tab/Cap - Peds 100 MICROGram(s) Oral daily  lidocaine   Patch 1 Patch Transdermal daily  senna 2 Tablet(s) Oral at bedtime  simvastatin Oral Tab/Cap - Peds 20 milliGRAM(s) Oral daily    MEDICATIONS  (PRN):  polyethylene glycol 3350 17 Gram(s) Oral two times a day PRN Constipation  traMADol 50 milliGRAM(s) Oral every 6 hours PRN Moderate Pain (4 - 6)      RADIOLOGY STUDIES:    CULTURES:

## 2020-12-02 ENCOUNTER — APPOINTMENT (OUTPATIENT)
Dept: ORTHOPEDIC SURGERY | Facility: CLINIC | Age: 85
End: 2020-12-02
Payer: COMMERCIAL

## 2020-12-02 VITALS — TEMPERATURE: 94.6 F

## 2020-12-02 VITALS
WEIGHT: 162 LBS | HEIGHT: 58 IN | DIASTOLIC BLOOD PRESSURE: 84 MMHG | SYSTOLIC BLOOD PRESSURE: 132 MMHG | HEART RATE: 92 BPM | BODY MASS INDEX: 34 KG/M2

## 2020-12-02 DIAGNOSIS — S32.9XXA FRACTURE OF UNSPECIFIED PARTS OF LUMBOSACRAL SPINE AND PELVIS, INITIAL ENCOUNTER FOR CLOSED FRACTURE: ICD-10-CM

## 2020-12-02 PROCEDURE — 99204 OFFICE O/P NEW MOD 45 MIN: CPT | Mod: 25

## 2020-12-02 PROCEDURE — 72190 X-RAY EXAM OF PELVIS: CPT

## 2020-12-02 PROCEDURE — 27197 CLSD TX PELVIC RING FX: CPT

## 2020-12-02 PROCEDURE — 99072 ADDL SUPL MATRL&STAF TM PHE: CPT

## 2020-12-02 RX ORDER — CALCITONIN SALMON 200 [IU]/1
200 SPRAY, METERED NASAL DAILY
Qty: 1 | Refills: 0 | Status: COMPLETED | OUTPATIENT
Start: 2020-12-02

## 2020-12-02 NOTE — PHYSICAL EXAM
[Slightly Antalgic] : slightly antalgic [Normal LLE] : Left Lower Extremity: No scars, rashes, lesions, ulcers, skin intact [Normal] : no peripheral adenopathy appreciated [de-identified] : Physical Exam:\par General: Well appearing, no acute distress, A&O\par Neurologic: A&Ox3, No focal deficits\par Head: NCAT without abrasions, lacerations, or ecchymosis to head, face, or scalp\par Respiratory: Equal chest wall expansion bilaterally, no accessory muscle use\par Lymphatic: No lymphadenopathy palpated\par Skin: Warm and dry\par Psychiatric: Normal mood and affect [de-identified] : LLE: skin intact. \par +ehl/fhl/ta/gs/ke/kf/hf\par +L3-S1 silt\par +dp\par painless ROM LLE [de-identified] : nonlabored breathing [de-identified] : Multiple views XR pelvis show fracture in maintained alignment/position, with evidence of bony healing. No new fx seen
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
## 2020-12-03 ENCOUNTER — EMERGENCY (EMERGENCY)
Facility: HOSPITAL | Age: 85
LOS: 1 days | Discharge: HOME | End: 2020-12-03
Attending: EMERGENCY MEDICINE
Payer: COMMERCIAL

## 2020-12-03 VITALS
RESPIRATION RATE: 18 BRPM | HEIGHT: 58 IN | DIASTOLIC BLOOD PRESSURE: 89 MMHG | TEMPERATURE: 98 F | OXYGEN SATURATION: 98 % | WEIGHT: 151.9 LBS | HEART RATE: 95 BPM | SYSTOLIC BLOOD PRESSURE: 157 MMHG

## 2020-12-03 DIAGNOSIS — Z96.651 PRESENCE OF RIGHT ARTIFICIAL KNEE JOINT: Chronic | ICD-10-CM

## 2020-12-03 PROCEDURE — 73502 X-RAY EXAM HIP UNI 2-3 VIEWS: CPT

## 2020-12-03 PROCEDURE — 73552 X-RAY EXAM OF FEMUR 2/>: CPT

## 2020-12-03 PROCEDURE — 99285 EMERGENCY DEPT VISIT HI MDM: CPT

## 2020-12-03 PROCEDURE — 73564 X-RAY EXAM KNEE 4 OR MORE: CPT | Mod: 26,LT

## 2020-12-03 PROCEDURE — 73590 X-RAY EXAM OF LOWER LEG: CPT | Mod: 26,LT

## 2020-12-03 PROCEDURE — 93971 EXTREMITY STUDY: CPT | Mod: 26,LT

## 2020-12-03 PROCEDURE — 93971 EXTREMITY STUDY: CPT

## 2020-12-03 PROCEDURE — 73552 X-RAY EXAM OF FEMUR 2/>: CPT | Mod: 26,LT

## 2020-12-03 PROCEDURE — 99284 EMERGENCY DEPT VISIT MOD MDM: CPT | Mod: 25

## 2020-12-03 PROCEDURE — 73590 X-RAY EXAM OF LOWER LEG: CPT

## 2020-12-03 PROCEDURE — 73502 X-RAY EXAM HIP UNI 2-3 VIEWS: CPT | Mod: 26,LT

## 2020-12-03 PROCEDURE — 73564 X-RAY EXAM KNEE 4 OR MORE: CPT

## 2020-12-03 NOTE — ED PROVIDER NOTE - CARE PROVIDER_API CALL
Guanakito Ramos)  Orthopaedic Surgery  [address] [place] [address]  Phone: [phone]  Fax: [phone]  Follow Up Time:

## 2020-12-03 NOTE — ED PROVIDER NOTE - PATIENT PORTAL LINK FT
You can access the FollowMyHealth Patient Portal offered by [facility] by registering at the following website: [email or web address]. By joining [employer]’s FollowMyHealth portal, you will also be able to view your health information using other applications (apps) compatible with our system.

## 2020-12-03 NOTE — ED PROVIDER NOTE - ATTENDING CONTRIBUTION TO CARE
I, Wesly Vallejo, performed a face to face bedside interview with this patient regarding history of present illness, review of symptoms and relevant past medical, social and family history.  I completed an independent physical examination. I have communicated the patient’s plan of care and disposition with the ACP.      88 yo female pmh pelvic fx tx non surgical presents to the ED with complaint of Lt leg pain x2 wk.is constat made worse with activity not improved by anything. Pt states that is has limited her ability to ambulate, Pt states that she discussed symptoms with PCP who sent her to the ED for RO DVT.   pe awake alert heent ncat neck supple cor s1 s2 lung clear abd  soft nontender left hip mild tenderness to palpation;  pain with rom; dx leg pain

## 2020-12-03 NOTE — ED PROVIDER NOTE - NS ED ROS FT
ROS: CONTUSIONAL: Denies fever, chills, fatigue, wt loss. HEAD: Denies trauma, HA, Dizziness. EYE: Denies Acute visual changes, diplopia. ENMT: Denies change in hearing, tinnitus, epistaxis, difficulty swallowing, sore throat. CARDIO: Denies CP, palpitations, edema. RESP: Denies Cough, SOB , Diff breathing, hemoptysis. GI: Denies N/V, ABD pain, change in bowel movement. URINARY: Denies difficulty urinating, pelvic pain. MS: +joint pain, Denies  back pain, weakness, decreased ROM, swelling. NEURO: Denies change in gait, seizures, loss of sensation, dizziness, confusion LOC.  PSY: NO SI/HI.

## 2020-12-03 NOTE — ED PROVIDER NOTE - CLINICAL SUMMARY MEDICAL DECISION MAKING FREE TEXT BOX
PT with stable VS, no acute distress, non toxic appearing, tolerating PO in the ED, Pt with no acute changes, no acute findings, Pt with pain likely secondary to recent Fx, Pt to be dc home with supportive care, follow up to ortho, OTC pain control, educated about when to return to the ED if needed. PT verbalizes that he understands all instructions and results. Pt informed that ED is open and available 24/7 365 days a yr, encouraged to return to the ED if they have any change in condition, or feel the need for revaluation.

## 2020-12-03 NOTE — ED PROVIDER NOTE - OBJECTIVE STATEMENT
PT with SPMHX of pelvic fx tx non surgical presents to the ED with complaint of Lt leg pain x2 wk. Pt states that she had a gradual onset of dull pain that starts in the top of her Lt leg and radiates to distally, is constat made worse with activity not improved by anything. Pt states that is has limited her ability to ambulate, Pt states that she discussed symptoms with PCP who sent her to the ED for RO DVT. Pt dines SOB, diff breathing, new trama, numbness, tingling, loss of sensation, HA, dizziness.

## 2020-12-19 ENCOUNTER — INPATIENT (INPATIENT)
Facility: HOSPITAL | Age: 85
LOS: 3 days | Discharge: NURSING, REHAB OR LONG-TERM CARE | DRG: 690 | End: 2020-12-23
Attending: HOSPITALIST | Admitting: HOSPITALIST
Payer: MEDICARE

## 2020-12-19 VITALS
HEART RATE: 90 BPM | TEMPERATURE: 98 F | SYSTOLIC BLOOD PRESSURE: 144 MMHG | OXYGEN SATURATION: 98 % | HEIGHT: 58 IN | DIASTOLIC BLOOD PRESSURE: 89 MMHG | RESPIRATION RATE: 18 BRPM | WEIGHT: 151.9 LBS

## 2020-12-19 DIAGNOSIS — Z96.651 PRESENCE OF RIGHT ARTIFICIAL KNEE JOINT: Chronic | ICD-10-CM

## 2020-12-19 PROBLEM — S32.9XXA FRACTURE OF UNSPECIFIED PARTS OF LUMBOSACRAL SPINE AND PELVIS, INITIAL ENCOUNTER FOR CLOSED FRACTURE: Chronic | Status: ACTIVE | Noted: 2020-12-03

## 2020-12-19 PROCEDURE — 72131 CT LUMBAR SPINE W/O DYE: CPT | Mod: 26

## 2020-12-19 PROCEDURE — 73502 X-RAY EXAM HIP UNI 2-3 VIEWS: CPT | Mod: 26,RT

## 2020-12-19 PROCEDURE — 72192 CT PELVIS W/O DYE: CPT | Mod: 26

## 2020-12-19 PROCEDURE — 99218: CPT

## 2020-12-19 RX ORDER — SIMVASTATIN 20 MG/1
20 TABLET, FILM COATED ORAL AT BEDTIME
Refills: 0 | Status: DISCONTINUED | OUTPATIENT
Start: 2020-12-19 | End: 2020-12-23

## 2020-12-19 RX ORDER — SENNA PLUS 8.6 MG/1
2 TABLET ORAL AT BEDTIME
Refills: 0 | Status: DISCONTINUED | OUTPATIENT
Start: 2020-12-19 | End: 2020-12-23

## 2020-12-19 RX ORDER — AMLODIPINE BESYLATE 2.5 MG/1
2.5 TABLET ORAL DAILY
Refills: 0 | Status: DISCONTINUED | OUTPATIENT
Start: 2020-12-19 | End: 2020-12-23

## 2020-12-19 RX ORDER — IBUPROFEN 200 MG
200 TABLET ORAL EVERY 6 HOURS
Refills: 0 | Status: DISCONTINUED | OUTPATIENT
Start: 2020-12-19 | End: 2020-12-23

## 2020-12-19 RX ORDER — DULOXETINE HYDROCHLORIDE 30 MG/1
60 CAPSULE, DELAYED RELEASE ORAL DAILY
Refills: 0 | Status: DISCONTINUED | OUTPATIENT
Start: 2020-12-19 | End: 2020-12-23

## 2020-12-19 RX ORDER — IBUPROFEN 200 MG
400 TABLET ORAL ONCE
Refills: 0 | Status: COMPLETED | OUTPATIENT
Start: 2020-12-19 | End: 2020-12-19

## 2020-12-19 RX ADMIN — DULOXETINE HYDROCHLORIDE 60 MILLIGRAM(S): 30 CAPSULE, DELAYED RELEASE ORAL at 21:56

## 2020-12-19 RX ADMIN — Medication 400 MILLIGRAM(S): at 14:22

## 2020-12-19 RX ADMIN — SENNA PLUS 2 TABLET(S): 8.6 TABLET ORAL at 21:55

## 2020-12-19 RX ADMIN — SIMVASTATIN 20 MILLIGRAM(S): 20 TABLET, FILM COATED ORAL at 21:56

## 2020-12-19 NOTE — ED ADULT TRIAGE NOTE - CHIEF COMPLAINT QUOTE
Pelvic fx s/p fall in October.  Pt fell again 3 days ago and has a new pelvic fracture via outpatient xray today.  Alert and oriented X 4 and in no acute distress.

## 2020-12-19 NOTE — ED PROVIDER NOTE - MUSCULOSKELETAL, MLM
Spine appears normal, range of motion is not limited, no muscle or joint tenderness, pelvis stable, slr neg b/l

## 2020-12-19 NOTE — ED PROVIDER NOTE - OBJECTIVE STATEMENT
86 y/o F with h/o htn, osteoporosis, recent fall with rami fx and stay at [facility] and under home PT care p/w recurrent fx after she feel 4 days ago when she tried to walk w/o walker at home. No hit to head or loc. Pt is still able to use walker but had low back pain and rt hip pain and went to get xray which showed new fx. No difficulty urinating, defecating. Pt lives at home with family and has home aide and home PT arranged so far

## 2020-12-19 NOTE — ED CDU PROVIDER INITIAL DAY NOTE - MEDICAL DECISION MAKING DETAILS
87f with fall on Wednesday found to have nondisplaced fracture of the right L5 transverse process and  Nondisplaced acute fracture of the right iliac bone. Pt placed in CDU for MR L spine with PT and SW consult.

## 2020-12-19 NOTE — CONSULT NOTE ADULT - ATTENDING COMMENTS
Patient seen and examined at bedside on 12/20/2020.   Patient reports she is now experiencing nausea and vomiting and diarrhea which may be related to the pain medication.  Patient has not been up with PT secondary to the other medical issues.    Ortho to follow closely, continue PWB LLE.

## 2020-12-19 NOTE — CONSULT NOTE ADULT - SUBJECTIVE AND OBJECTIVE BOX
Asked to ED to evaluate 86 y/o female with left sided pelvic/hip pain status post mechanical fall 3 days ago. Patient has history of recent fall in October with pubic rami fx and stay at [facility] and is now under home PT care. She is ambulating with walker secondary to recent fracture in october, states while she was walking, her walker got caught causing her to fall on her side. Patient presented to ER today due to progressively worsening pain. Imaging obtained in ER revealed new/acute left iliac fracture   Of note, also reports low back pain, has L5 fracture being followed Neurosurgery.    PAST MEDICAL & SURGICAL HISTORY:  Pelvic fracture    Osteoporosis    Hypothyroidism    High cholesterol    H/O total knee replacement, right    ALLERGIES:    Tylenol (Unknown)    Intolerances    SOCIAL HISTORY:    lives at home with family  denies smoking, denies illicit drug use    PE: NAD, resting comfortably  LLE:  no swelling, no ecchymosis, skin intact left hip/pelvic region  gross motor intact: + dorsi/plantar flexion. + EHL/FHL  gross sensation intact  calves soft , NT b/L LE    CT Pelvis: acute left Iliac fracture. subacute-chronic pubic rami and L sacral ala    A/P 86 y/o female with acute left iliac fracture    no orthopedic surgery indicated at this time  Partial weight bearing left lower extremity at 25%  physical/occupational therapy   pain management  follow up in office with Dr Amos for reevaluation   d/w Dr Amos
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
## 2020-12-19 NOTE — ED ADULT NURSE NOTE - NSIMPLEMENTINTERV_GEN_ALL_ED
Implemented All Fall with Harm Risk Interventions:  [place] to call system. Call bell, personal items and telephone within reach. Instruct patient to call for assistance. Room bathroom lighting operational. Non-slip footwear when patient is off stretcher. Physically safe environment: no spills, clutter or unnecessary equipment. Stretcher in lowest position, wheels locked, appropriate side rails in place. Provide visual cue, wrist band, yellow gown, etc. Monitor gait and stability. Monitor for mental status changes and reorient to person, place, and time. Review medications for side effects contributing to fall risk. Reinforce activity limits and safety measures with patient and family. Provide visual clues: red socks.

## 2020-12-19 NOTE — CONSULT NOTE ADULT - ASSESSMENT
Patient is a 87 year old right hand dominant female with a past medical history significant for left pubic rami fracture from October 2020, HTN, HLD, hypothyroidism, osteoporosis, and right knee replacement status post fall Wednesday 12/16/2020.     1. Admit to observation   2. MRI of the lumbar spine   3. Bedrest at this time   4. Plan discussed with Dr Jin

## 2020-12-19 NOTE — ED ADULT NURSE NOTE - OBJECTIVE STATEMENT
Assumed pt care at 1600.  Pt a&ox4, states she fell 3days ago, went for outpatient xray and was called today to come to ED for +pelvic fracture.  Pt states she has been able to ambulate with walker at home, offers no c/o pain at this time, no acute s/s of respiratory distress noted or reported at this time, will continue to monitor

## 2020-12-19 NOTE — CONSULT NOTE ADULT - SUBJECTIVE AND OBJECTIVE BOX
Neurosurgery PA-C  Consult note     Patient is a 87 year old right hand dominant female with a past medical history significant for who presents with a chief complaint of         HISTORY OF PRESENT ILLNESS:   87yF PMH       Pelvic fracture    Osteoporosis    Hypothyroidism    High cholesterol    H/O total knee replacement, right      FAMILY HISTORY:  No pertinent family history in first degree relatives    SOCIAL HISTORY:  Tobacco Use:  EtOH use:   Substance:    Allergies: Tylenol (Unknown)    REVIEW OF SYSTEMS  CONSTITUTIONAL: No fever, weight loss, or fatigue  EYES: No eye pain, visual disturbances, or discharge  ENMT:  No difficulty hearing, tinnitus, vertigo; No sinus or throat pain  NECK: No pain or stiffness  RESPIRATORY: No cough, wheezing, chills or hemoptysis; No shortness of breath  CARDIOVASCULAR: No chest pain, palpitations, dizziness, or leg swelling  GASTROINTESTINAL: No abdominal or epigastric pain. No nausea, vomiting, or hematemesis; No diarrhea or constipation. No melena or hematochezia.  GENITOURINARY: No dysuria, frequency, hematuria, or incontinence  NEUROLOGICAL: No headaches, memory loss, loss of strength, numbness, or tremors  SKIN: No itching, burning, rashes, or lesions   LYMPH NODES: No enlarged glands  ENDOCRINE: No heat or cold intolerance; No hair loss  MUSCULOSKELETAL: No joint pain or swelling; No muscle, back, or extremity pain  PSYCHIATRIC: No depression, anxiety, mood swings, or difficulty sleeping  HEME/LYMPH: No easy bruising, or bleeding gums  ALLERY AND IMMUNOLOGIC: No hives or eczema    HOME MEDICATIONS:  Home Medications:  alendronate:  orally  (28 Oct 2014 04:06)  brimonidine ophthalmic 0.2% ophthalmic solution:  to each affected eye  (28 Oct 2014 04:06)  celecoxib 200 mg oral capsule: 1 cap(s) orally once a day (20 Oct 2020 10:55)  folic acid:  orally  (28 Oct 2014 04:06)  gabapentin 300 mg oral capsule: 1 cap(s) orally every 8 hours (20 Oct 2020 10:55)  levothyroxine:  orally  (28 Oct 2014 04:06)  polyethylene glycol 3350 oral powder for reconstitution: 17 gram(s) orally 2 times a day, As needed, Constipation (20 Oct 2020 10:55)  senna oral tablet: 2 tab(s) orally once a day (at bedtime) (20 Oct 2020 10:55)  simvastatin:  orally  (28 Oct 2014 04:06)  traMADol 50 mg oral tablet: 1 tab(s) orally every 6 hours, As needed, Moderate Pain (4 - 6) (20 Oct 2020 10:55)  Travatan Z 0.004% ophthalmic solution:  to each affected eye  (28 Oct 2014 04:06)      MEDICATIONS:      Vital Signs Last 24 Hrs  T(C): 36.7 (19 Dec 2020 13:44), Max: 36.7 (19 Dec 2020 13:44)  T(F): 98.1 (19 Dec 2020 13:44), Max: 98.1 (19 Dec 2020 13:44)  HR: 90 (19 Dec 2020 13:44) (90 - 90)  BP: 144/89 (19 Dec 2020 13:44) (144/89 - 144/89)  RR: 18 (19 Dec 2020 13:44) (18 - 18)  SpO2: 98% (19 Dec 2020 13:44) (98% - 98%)      PHYSICAL EXAM:  GENERAL: NAD, well-groomed, well-developed  HEAD:  Atraumatic, normocephalic  EYES: Conjunctiva and sclera clear  ENMT: moist mucous membranes, good dentition, no lesions  NECK: Supple, no JVD, normal thyroid  IVANA COMA SCORE: E- V- M- =       E: 4= opens eyes spontaneously 3= to voice 2= to noxious 1= no opening       V: 5= oriented 4= confused 3= inappropriate words 2= incomprehensible sounds 1= nonverbal 1T= intubated       M: 6= follows commands 5= localizes 4= withdraws 3= flexor posturing 2= extensor posturing 1= no movement  MENTAL STATUS: AAO x3 Appropriately conversant without aphasia, following simple commands  CRANIAL NERVES: PERRL. EOMI without nystagmus. Facial sensation intact V1-3 distribution b/l. Face symmetric w/ normal eye closure and smile, tongue midline. Hearing grossly intact. Speech clear. Head turning and shoulder shrug intact.   REFLEXES: No pronator drift; DTRs 2+ intact and symmetric; negative Perales's b/l; negative clonus b/l; no upper extremity dysmetria  MOTOR: strength 5/5 b/l upper and lower extremities  Uppers     Delt     Bicep     Tricep     HG  R                5/5       5/5         5/5         5/5  L                5/5       5/5         5/5         5/5  Lowers      HF     KF      KE     PF     DF     EHL  R             5/5     5/5     5/5    5/5   5/5    5/5  L              5/5    5/5      5/5    5/5   5/5    5/5  SENSATION: grossly intact to light touch all extremities  SENSATION: grossly intact to light touch all extremities  MUSCLE STRETCH REFLEXES: DTRs 2+ intact and symmetric; no Perales's b/l; no clonus b/l  CHEST/LUNG: Clear to auscultation bilaterally; no rales, rhonchi, wheezing, or rubs  HEART: +S1/+S2; Regular rate and rhythm; no murmurs, rubs, or gallops  ABDOMEN: Soft, nontender, nondistended; bowel sounds present all four quadrants  EXTREMITIES:  2+ peripheral pulses, no clubbing, cyanosis, or edema  LYMPH: No lymphadenopathy noted  SKIN: Warm, dry; no rashes or lesions      RADIOLOGY & ADDITIONAL STUDIES:  EXAM:  CT PELVIS BONY ONLY                        PROCEDURE DATE:  12/19/2020    INTERPRETATION:  CT of the pelvis    CLINICAL INFORMATION: Status post fall  TECHNIQUE: Axial CT images were obtained of the pelvis with coronal and sagittal reconstructions. No contrast was administered. Three dimensional reconstructions were performed.    COMPARISON: Hip radiographs 12/3/2020.    FINDINGS:    There is a nondisplaced fracture of the right L5 transverse process. There is a nondisplaced acute fracture of the right iliac bone at the level of the right sacroiliac joint extending distally, terminating superior to the acetabulum (series 7, image 127 and series 5, images 48-75). There are subacute to old fractures of the left sacral ala and left superior and inferior pubic rami. No hip fracture is present. There is mild to moderate moderate bilateral hip arthrosis. No large fluid collection or hematoma. There are vascular calcifications. Urinary bladder diverticula are present. Multiple foci of cutaneous soft tissue lesions.    IMPRESSION:    Nondisplaced fracture of the right L5 transverse process.  Nondisplaced acute fracture of the right iliac bone at the level of the sacroiliac joint extending distally, above the acetabulum.  Subacute to chronic fractures of the left sacral ala and left superior and inferior pubic rami.   Neurosurgery PA-C  Consult note     Patient is a 87 year old right hand dominant female with a past medical history significant for left pubic rami fracture from October 2020, HTN, HLD, hypothyroidism, osteoporosis, and right knee replacement status post fall Wednesday 12/16/2020. Patient currently complains of lower back pain, that does not radiate down her legs. Patient denies saddle anesthesia.     PMHX: Left pubic rami fracture, osteoporosis, hypothyroidism, hypertension   PSHX: right total knee replacement   FAMILY HISTORY: No pertinent family history in first degree relatives    SOCIAL HISTORY:  Tobacco Use: denies   EtOH use: glass of red wine August   Substance: denies     Allergies: Tylenol (Unknown)    REVIEW OF SYSTEMS  CONSTITUTIONAL: No fever, weight loss, or fatigue  EYES: No eye pain, visual disturbances, or discharge  ENMT:  No difficulty hearing, tinnitus, vertigo; No sinus or throat pain  NECK: No pain or stiffness  RESPIRATORY: No cough, wheezing, chills or hemoptysis; No shortness of breath  CARDIOVASCULAR: No chest pain, palpitations, dizziness, or leg swelling  GASTROINTESTINAL: No abdominal or epigastric pain. No nausea, vomiting, or hematemesis; No diarrhea or constipation. No melena or hematochezia.  GENITOURINARY: No dysuria, frequency, hematuria, or incontinence  NEUROLOGICAL: No headaches, memory loss, loss of strength, numbness, or tremors  SKIN: No itching, burning, rashes, or lesions   LYMPH NODES: No enlarged glands  ENDOCRINE: No heat or cold intolerance; No hair loss  MUSCULOSKELETAL: No joint pain or swelling; No muscle, back, or extremity pain  PSYCHIATRIC: No depression, anxiety, mood swings, or difficulty sleeping  HEME/LYMPH: No easy bruising, or bleeding gums  ALLERGY AND IMMUNOLOGIC: No hives or eczema    HOME MEDICATIONS:  Home Medications:  alendronate:  orally  (28 Oct 2014 04:06)  brimonidine ophthalmic 0.2% ophthalmic solution:  to each affected eye  (28 Oct 2014 04:06)  celecoxib 200 mg oral capsule: 1 cap(s) orally once a day (20 Oct 2020 10:55)  folic acid:  orally  (28 Oct 2014 04:06)  gabapentin 300 mg oral capsule: 1 cap(s) orally every 8 hours (20 Oct 2020 10:55)  levothyroxine:  orally  (28 Oct 2014 04:06)  polyethylene glycol 3350 oral powder for reconstitution: 17 gram(s) orally 2 times a day, As needed, Constipation (20 Oct 2020 10:55)  senna oral tablet: 2 tab(s) orally once a day (at bedtime) (20 Oct 2020 10:55)  simvastatin:  orally  (28 Oct 2014 04:06)  traMADol 50 mg oral tablet: 1 tab(s) orally every 6 hours, As needed, Moderate Pain (4 - 6) (20 Oct 2020 10:55)  Travatan Z 0.004% ophthalmic solution:  to each affected eye  (28 Oct 2014 04:06)      MEDICATIONS:      Vital Signs Last 24 Hrs  T(C): 36.7 (19 Dec 2020 13:44), Max: 36.7 (19 Dec 2020 13:44)  T(F): 98.1 (19 Dec 2020 13:44), Max: 98.1 (19 Dec 2020 13:44)  HR: 90 (19 Dec 2020 13:44) (90 - 90)  BP: 144/89 (19 Dec 2020 13:44) (144/89 - 144/89)  RR: 18 (19 Dec 2020 13:44) (18 - 18)  SpO2: 98% (19 Dec 2020 13:44) (98% - 98%)      PHYSICAL EXAM:  GENERAL: NAD, well-developed  HEAD:  Atraumatic, normocephalic  EYES: Conjunctiva and sclera clear  ENMT: moist mucous membranes, no lesions  NECK: Supple, no JVD, normal thyroid  IVANA COMA SCORE: E- V- M- =15   MENTAL STATUS: A A O x 3 Appropriately conversant without aphasia, following simple commands  CRANIAL NERVES: PERRL. EOMI without nystagmus. Facial sensation intact V1-3 distribution b/l. Face symmetric w/ normal eye closure and smile, tongue midline. Hearing grossly intact. Speech clear. Head turning and shoulder shrug intact.   REFLEXES: No pronator drift; DTRs 2+ intact and symmetric; negative Perales's b/l; negative clonus b/l; no upper extremity dysmetria  MOTOR: strength 5/5 b/l upper and lower extremities  Uppers     Delt     Bicep     Tricep     HG  R                5/5       5/5         5/5         5/5  L                5/5       5/5         5/5         5/5  Lowers      HF     KF      KE     PF     DF       R             5/5     5/5     5/5    5/5   5/5     L unable tdue pain        4/5    5/5    5/5    SENSATION: grossly intact to light touch all extremities  MUSCLE STRETCH REFLEXES: no Perales's b/l; no clonus b/l  CHEST/LUNG: Clear to auscultation bilaterally; no rales, rhonchi, wheezing, or rubs  HEART: +S1/+S2  ABDOMEN: Soft, nontender, nondistended  EXTREMITIES:  2+ peripheral pulses, no clubbing, cyanosis, or edema  LYMPH: No lymphadenopathy noted  SKIN: Warm, dry; no rashes or lesions      RADIOLOGY & ADDITIONAL STUDIES:  EXAM:  CT PELVIS BONY ONLY                        PROCEDURE DATE:  12/19/2020    INTERPRETATION:  CT of the pelvis    CLINICAL INFORMATION: Status post fall  TECHNIQUE: Axial CT images were obtained of the pelvis with coronal and sagittal reconstructions. No contrast was administered. Three dimensional reconstructions were performed.    COMPARISON: Hip radiographs 12/3/2020.    FINDINGS:    There is a nondisplaced fracture of the right L5 transverse process. There is a nondisplaced acute fracture of the right iliac bone at the level of the right sacroiliac joint extending distally, terminating superior to the acetabulum (series 7, image 127 and series 5, images 48-75). There are subacute to old fractures of the left sacral ala and left superior and inferior pubic rami. No hip fracture is present. There is mild to moderate moderate bilateral hip arthrosis. No large fluid collection or hematoma. There are vascular calcifications. Urinary bladder diverticula are present. Multiple foci of cutaneous soft tissue lesions.    IMPRESSION:    Nondisplaced fracture of the right L5 transverse process.  Nondisplaced acute fracture of the right iliac bone at the level of the sacroiliac joint extending distally, above the acetabulum.  Subacute to chronic fractures of the left sacral ala and left superior and inferior pubic rami.

## 2020-12-19 NOTE — ED PROVIDER NOTE - PROGRESS NOTE DETAILS
pt comfortable, pt found to have new rt iliac bone fx extending to acetabulum, new L5 RT TRANSEVRE FX, ORTHO AND SPINE CALLED FOR REPSECTIVE FXS , will follow recommendations, Son aware that she may need overnight stay for PT eval and case mx eval

## 2020-12-19 NOTE — ED CDU PROVIDER INITIAL DAY NOTE - ATTENDING CONTRIBUTION TO CARE
Pt. awake and alert. Pt. with tenderness to lumbar spine. No focal deficit. Pt. moving all extremities. I, Dr. Viveros, performed a face to face bedside interview with this patient regarding history of present illness, review of symptoms and relevant past medical, social and family history.  I completed an independent physical examination.  I have also reviewed the ACP's note(s) and discussed the plan with the ACP.

## 2020-12-20 ENCOUNTER — TRANSCRIPTION ENCOUNTER (OUTPATIENT)
Age: 85
End: 2020-12-20

## 2020-12-20 DIAGNOSIS — S32.9XXA FRACTURE OF UNSPECIFIED PARTS OF LUMBOSACRAL SPINE AND PELVIS, INITIAL ENCOUNTER FOR CLOSED FRACTURE: ICD-10-CM

## 2020-12-20 DIAGNOSIS — S32.009S UNSPECIFIED FRACTURE OF UNSPECIFIED LUMBAR VERTEBRA, SEQUELA: ICD-10-CM

## 2020-12-20 LAB
ALBUMIN SERPL ELPH-MCNC: 4.3 G/DL — SIGNIFICANT CHANGE UP (ref 3.3–5.2)
ALP SERPL-CCNC: 163 U/L — HIGH (ref 40–120)
ALT FLD-CCNC: 14 U/L — SIGNIFICANT CHANGE UP
ANION GAP SERPL CALC-SCNC: 13 MMOL/L — SIGNIFICANT CHANGE UP (ref 5–17)
APPEARANCE UR: ABNORMAL
AST SERPL-CCNC: 21 U/L — SIGNIFICANT CHANGE UP
BACTERIA # UR AUTO: ABNORMAL
BASOPHILS # BLD AUTO: 0.11 K/UL — SIGNIFICANT CHANGE UP (ref 0–0.2)
BASOPHILS NFR BLD AUTO: 0.7 % — SIGNIFICANT CHANGE UP (ref 0–2)
BILIRUB SERPL-MCNC: 0.8 MG/DL — SIGNIFICANT CHANGE UP (ref 0.4–2)
BILIRUB UR-MCNC: NEGATIVE — SIGNIFICANT CHANGE UP
BUN SERPL-MCNC: 13 MG/DL — SIGNIFICANT CHANGE UP (ref 8–20)
CALCIUM SERPL-MCNC: 9.4 MG/DL — SIGNIFICANT CHANGE UP (ref 8.6–10.2)
CHLORIDE SERPL-SCNC: 102 MMOL/L — SIGNIFICANT CHANGE UP (ref 98–107)
CO2 SERPL-SCNC: 22 MMOL/L — SIGNIFICANT CHANGE UP (ref 22–29)
COLOR SPEC: YELLOW — SIGNIFICANT CHANGE UP
CREAT SERPL-MCNC: 0.39 MG/DL — LOW (ref 0.5–1.3)
DIFF PNL FLD: ABNORMAL
EOSINOPHIL # BLD AUTO: 0.08 K/UL — SIGNIFICANT CHANGE UP (ref 0–0.5)
EOSINOPHIL NFR BLD AUTO: 0.5 % — SIGNIFICANT CHANGE UP (ref 0–6)
EPI CELLS # UR: SIGNIFICANT CHANGE UP
GLUCOSE SERPL-MCNC: 116 MG/DL — HIGH (ref 70–99)
GLUCOSE UR QL: NEGATIVE — SIGNIFICANT CHANGE UP
HCT VFR BLD CALC: 44.6 % — SIGNIFICANT CHANGE UP (ref 34.5–45)
HCT VFR BLD CALC: 47.5 % — HIGH (ref 34.5–45)
HGB BLD-MCNC: 14.6 G/DL — SIGNIFICANT CHANGE UP (ref 11.5–15.5)
HGB BLD-MCNC: 15.2 G/DL — SIGNIFICANT CHANGE UP (ref 11.5–15.5)
IMM GRANULOCYTES NFR BLD AUTO: 0.4 % — SIGNIFICANT CHANGE UP (ref 0–1.5)
KETONES UR-MCNC: ABNORMAL
LEUKOCYTE ESTERASE UR-ACNC: ABNORMAL
LIDOCAIN IGE QN: 18 U/L — LOW (ref 22–51)
LYMPHOCYTES # BLD AUTO: 0.78 K/UL — LOW (ref 1–3.3)
LYMPHOCYTES # BLD AUTO: 4.9 % — LOW (ref 13–44)
MAGNESIUM SERPL-MCNC: 1.9 MG/DL — SIGNIFICANT CHANGE UP (ref 1.8–2.6)
MCHC RBC-ENTMCNC: 29.6 PG — SIGNIFICANT CHANGE UP (ref 27–34)
MCHC RBC-ENTMCNC: 32 GM/DL — SIGNIFICANT CHANGE UP (ref 32–36)
MCV RBC AUTO: 92.6 FL — SIGNIFICANT CHANGE UP (ref 80–100)
MONOCYTES # BLD AUTO: 1.34 K/UL — HIGH (ref 0–0.9)
MONOCYTES NFR BLD AUTO: 8.3 % — SIGNIFICANT CHANGE UP (ref 2–14)
NEUTROPHILS # BLD AUTO: 13.68 K/UL — HIGH (ref 1.8–7.4)
NEUTROPHILS NFR BLD AUTO: 85.2 % — HIGH (ref 43–77)
NITRITE UR-MCNC: POSITIVE
PH UR: 6.5 — SIGNIFICANT CHANGE UP (ref 5–8)
PLATELET # BLD AUTO: 278 K/UL — SIGNIFICANT CHANGE UP (ref 150–400)
POTASSIUM SERPL-MCNC: 3.9 MMOL/L — SIGNIFICANT CHANGE UP (ref 3.5–5.3)
POTASSIUM SERPL-SCNC: 3.9 MMOL/L — SIGNIFICANT CHANGE UP (ref 3.5–5.3)
PROCALCITONIN SERPL-MCNC: 0.15 NG/ML — HIGH (ref 0.02–0.1)
PROT SERPL-MCNC: 7.2 G/DL — SIGNIFICANT CHANGE UP (ref 6.6–8.7)
PROT UR-MCNC: 30 MG/DL
RBC # BLD: 5.13 M/UL — SIGNIFICANT CHANGE UP (ref 3.8–5.2)
RBC # FLD: 13.4 % — SIGNIFICANT CHANGE UP (ref 10.3–14.5)
RBC CASTS # UR COMP ASSIST: ABNORMAL /HPF (ref 0–4)
SARS-COV-2 RNA SPEC QL NAA+PROBE: SIGNIFICANT CHANGE UP
SODIUM SERPL-SCNC: 137 MMOL/L — SIGNIFICANT CHANGE UP (ref 135–145)
SP GR SPEC: 1.01 — SIGNIFICANT CHANGE UP (ref 1.01–1.02)
TSH SERPL-MCNC: 6.17 UIU/ML — HIGH (ref 0.27–4.2)
UROBILINOGEN FLD QL: 1
WBC # BLD: 16.06 K/UL — HIGH (ref 3.8–10.5)
WBC # FLD AUTO: 16.06 K/UL — HIGH (ref 3.8–10.5)
WBC UR QL: ABNORMAL

## 2020-12-20 PROCEDURE — 74176 CT ABD & PELVIS W/O CONTRAST: CPT | Mod: 26

## 2020-12-20 PROCEDURE — 99217: CPT

## 2020-12-20 PROCEDURE — 72148 MRI LUMBAR SPINE W/O DYE: CPT | Mod: 26

## 2020-12-20 PROCEDURE — 99223 1ST HOSP IP/OBS HIGH 75: CPT

## 2020-12-20 RX ORDER — LATANOPROST 0.05 MG/ML
1 SOLUTION/ DROPS OPHTHALMIC; TOPICAL AT BEDTIME
Refills: 0 | Status: DISCONTINUED | OUTPATIENT
Start: 2020-12-20 | End: 2020-12-23

## 2020-12-20 RX ORDER — AMLODIPINE BESYLATE 2.5 MG/1
2.5 TABLET ORAL ONCE
Refills: 0 | Status: COMPLETED | OUTPATIENT
Start: 2020-12-20 | End: 2020-12-20

## 2020-12-20 RX ORDER — BRIMONIDINE TARTRATE 2 MG/MG
1 SOLUTION/ DROPS OPHTHALMIC
Refills: 0 | Status: DISCONTINUED | OUTPATIENT
Start: 2020-12-20 | End: 2020-12-23

## 2020-12-20 RX ORDER — CEFTRIAXONE 500 MG/1
1000 INJECTION, POWDER, FOR SOLUTION INTRAMUSCULAR; INTRAVENOUS EVERY 24 HOURS
Refills: 0 | Status: DISCONTINUED | OUTPATIENT
Start: 2020-12-20 | End: 2020-12-23

## 2020-12-20 RX ORDER — FOLIC ACID 0.8 MG
1 TABLET ORAL DAILY
Refills: 0 | Status: DISCONTINUED | OUTPATIENT
Start: 2020-12-20 | End: 2020-12-23

## 2020-12-20 RX ORDER — SODIUM CHLORIDE 9 MG/ML
1000 INJECTION INTRAMUSCULAR; INTRAVENOUS; SUBCUTANEOUS ONCE
Refills: 0 | Status: COMPLETED | OUTPATIENT
Start: 2020-12-20 | End: 2020-12-20

## 2020-12-20 RX ORDER — CEFTRIAXONE 500 MG/1
1000 INJECTION, POWDER, FOR SOLUTION INTRAMUSCULAR; INTRAVENOUS ONCE
Refills: 0 | Status: COMPLETED | OUTPATIENT
Start: 2020-12-20 | End: 2020-12-20

## 2020-12-20 RX ORDER — GABAPENTIN 400 MG/1
200 CAPSULE ORAL EVERY 8 HOURS
Refills: 0 | Status: DISCONTINUED | OUTPATIENT
Start: 2020-12-20 | End: 2020-12-23

## 2020-12-20 RX ORDER — ONDANSETRON 8 MG/1
4 TABLET, FILM COATED ORAL ONCE
Refills: 0 | Status: COMPLETED | OUTPATIENT
Start: 2020-12-20 | End: 2020-12-20

## 2020-12-20 RX ORDER — LEVOTHYROXINE SODIUM 125 MCG
100 TABLET ORAL ONCE
Refills: 0 | Status: COMPLETED | OUTPATIENT
Start: 2020-12-20 | End: 2020-12-20

## 2020-12-20 RX ORDER — LEVOTHYROXINE SODIUM 125 MCG
100 TABLET ORAL DAILY
Refills: 0 | Status: DISCONTINUED | OUTPATIENT
Start: 2020-12-20 | End: 2020-12-23

## 2020-12-20 RX ADMIN — Medication 1 MILLIGRAM(S): at 13:03

## 2020-12-20 RX ADMIN — Medication 400 MILLIGRAM(S): at 07:26

## 2020-12-20 RX ADMIN — AMLODIPINE BESYLATE 2.5 MILLIGRAM(S): 2.5 TABLET ORAL at 06:16

## 2020-12-20 RX ADMIN — Medication 100 MICROGRAM(S): at 07:09

## 2020-12-20 RX ADMIN — SIMVASTATIN 20 MILLIGRAM(S): 20 TABLET, FILM COATED ORAL at 22:02

## 2020-12-20 RX ADMIN — DULOXETINE HYDROCHLORIDE 60 MILLIGRAM(S): 30 CAPSULE, DELAYED RELEASE ORAL at 13:03

## 2020-12-20 RX ADMIN — SODIUM CHLORIDE 1000 MILLILITER(S): 9 INJECTION INTRAMUSCULAR; INTRAVENOUS; SUBCUTANEOUS at 16:24

## 2020-12-20 RX ADMIN — GABAPENTIN 200 MILLIGRAM(S): 400 CAPSULE ORAL at 22:02

## 2020-12-20 RX ADMIN — ONDANSETRON 4 MILLIGRAM(S): 8 TABLET, FILM COATED ORAL at 14:44

## 2020-12-20 RX ADMIN — CEFTRIAXONE 100 MILLIGRAM(S): 500 INJECTION, POWDER, FOR SOLUTION INTRAMUSCULAR; INTRAVENOUS at 16:24

## 2020-12-20 RX ADMIN — LATANOPROST 1 DROP(S): 0.05 SOLUTION/ DROPS OPHTHALMIC; TOPICAL at 23:07

## 2020-12-20 NOTE — PROGRESS NOTE ADULT - SUBJECTIVE AND OBJECTIVE BOX
NEUROSURGERY PROGRESS NOTE:    Patient is a 87 year old right hand dominant female with a past medical history significant for left pubic rami fracture from October 2020, HTN, HLD, hypothyroidism, osteoporosis, and right knee replacement status post fall Wednesday 12/16/2020. Patient currently complains of lower back pain, that does not radiate down her legs. Patient denies saddle anesthesia.   pt seen and states is at baseline, denied any new or worsening sensorimotor changes      MEDICATIONS  (STANDING):  amLODIPine   Tablet 2.5 milliGRAM(s) Oral daily  DULoxetine 60 milliGRAM(s) Oral daily  folic acid 1 milliGRAM(s) Oral daily  levothyroxine 100 MICROGram(s) Oral daily  ondansetron Injectable 4 milliGRAM(s) IV Push once  senna 2 Tablet(s) Oral at bedtime  simvastatin 20 milliGRAM(s) Oral at bedtime    MEDICATIONS  (PRN):  ibuprofen  Tablet. 200 milliGRAM(s) Oral every 6 hours PRN Moderate Pain (4 - 6)    Allergies    Tylenol (Unknown)    Intolerances      Vital Signs Last 24 Hrs  T(C): 36.3 (20 Dec 2020 07:45), Max: 36.7 (20 Dec 2020 04:58)  T(F): 97.4 (20 Dec 2020 07:45), Max: 98.1 (20 Dec 2020 04:58)  HR: 91 (20 Dec 2020 07:45) (87 - 91)  BP: 138/84 (20 Dec 2020 07:45) (130/69 - 138/84)  BP(mean): 89 (19 Dec 2020 23:49) (89 - 89)  RR: 18 (20 Dec 2020 07:45) (18 - 18)  SpO2: 96% (20 Dec 2020 07:45) (95% - 97%)      PHYSICAL EXAM:  GENERAL: NAD, well-developed  HEAD:  Atraumatic, normocephalic  EYES: Conjunctiva and sclera clear  IVANA COMA SCORE: E- V- M- =15   MENTAL STATUS: A A O x 3 Appropriately conversant without aphasia, following simple commands  CRANIAL NERVES: PERRL. EOMI without nystagmus. Facial sensation intact V1-3 distribution b/l. Face symmetric w/ normal eye closure and smile, tongue midline. Hearing grossly intact. Speech clear. Head turning and shoulder shrug intact.   REFLEXES: No pronator drift; DTRs 2+ intact and symmetric; negative Perales's b/l; negative clonus b/l; no upper extremity dysmetria  Uppers     Delt     Bicep     Tricep     HG  R                5/5       5/5         5/5         5/5  L                5/5       5/5         5/5         5/5  Lowers            HF     KF      KE     PF     DF       R                    5/5     5/5     5/5    5/5   5/5     L unable due to pain           4/5    5/5    5/5    SENSATION: grossly intact to light touch all extremities  MUSCLE STRETCH REFLEXES: no Perales's b/l; no clonus b/l  EXTREMITIES:  2+ peripheral pulses, no clubbing, cyanosis, or edema  SKIN: Warm, dry; no rashes or lesions        LABS:  no new labs for review       RADIOLOGY & ADDITIONAL TESTS:  < from: MR Lumbar Spine No Cont (12.20.20 @ 10:43) >  INTERPRETATION:  MR OF THE LUMBAR SPINE WITHOUT CONTRAST.  CLINICAL INDICATION: Back pain, rule out fractures  TECHNIQUE: Multiplanar, multisequence MR imaging of the lumbar spine was performed.  COMPARISON: CT lumbar spine, 12/19/2020, CT pelvis, 12/19/2020 and chest radiographs of the pelvis, 10/18/2020.    FINDINGS:  There is maintenance of the usual lumbar lordosis without malalignment. There is a fracture of the right transverse process of L5, series 6 image 37. The vertebral body heights are maintained. There are superior endplate Schmorl nodes at L1-L2. The bone marrow signal is homogeneous throughout. The intervertebral disc spaces are alsomaintained.  There is no significant disc bulge or herniation, spinal canal or neural foraminal narrowing.  The pre and paravertebral soft tissues are within limits of normal.  The conus medullaris ends in L1 and is normal in signal and contour. The cauda equina nerve roots are normal in configuration.  There are fractures of the right posterior iliac bone and left sacrum, also appreciated in the previous pelvic radiographs from 10/18/2020, which are indeterminate and likely represent subacute to chronic fractures.    IMPRESSION:  Insufficiency fractures of the right transverse process of L5, left sacral alae and right posterior iliac bone may be subacute to chronic. Further correlation with MRI of the pelvis may be obtained to characterize further.  No evidence of acute lumbar vertebral fracture.  No spinal canal narrowing or neural foraminal narrowing.    CHRISTINA RICE M.D., ATTENDING RADIOLOGIST  This document has been electronically signed. Dec 20 2020 11:00AM    < end of copied text >        Time Spent with patient/ education: > 15 mins

## 2020-12-20 NOTE — ED CDU PROVIDER SUBSEQUENT DAY NOTE - MEDICAL DECISION MAKING DETAILS
87f with fall on Wednesday found to have nondisplaced fracture of the right L5 transverse process and  Nondisplaced acute fracture of the right iliac bone. Pt pending MR spine and Pt / SW consult.

## 2020-12-20 NOTE — DISCHARGE NOTE PROVIDER - NSDCMRMEDTOKEN_GEN_ALL_CORE_FT
alendronate:  orally   brimonidine ophthalmic 0.2% ophthalmic solution:  to each affected eye   celecoxib 200 mg oral capsule: 1 cap(s) orally once a day  folic acid:  orally   gabapentin 300 mg oral capsule: 1 cap(s) orally every 8 hours  levothyroxine:  orally   ondansetron 4 mg oral tablet: 1 tab(s) orally every 6 hours  polyethylene glycol 3350 oral powder for reconstitution: 17 gram(s) orally 2 times a day, As needed, Constipation  senna oral tablet: 2 tab(s) orally once a day (at bedtime)  simvastatin:  orally   traMADol 50 mg oral tablet: 1 tab(s) orally every 6 hours, As needed, Moderate Pain (4 - 6)  Travatan Z 0.004% ophthalmic solution:  to each affected eye   Tylenol Caplet 325 mg oral tablet: 3 tab(s) orally every 4 hours   amLODIPine 2.5 mg oral tablet: 1 tab(s) orally once a day  aspirin 81 mg oral delayed release tablet: 1 tab(s) orally once a day  Augmentin 875 mg-125 mg oral tablet: 1 tab(s) orally every 12 hours for 4 days   brimonidine ophthalmic 0.2% ophthalmic solution:  to each affected eye   celecoxib 200 mg oral capsule: 1 cap(s) orally once a day  DULoxetine 60 mg oral delayed release capsule: 1 cap(s) orally once a day  enoxaparin: 40 milligram(s) subcutaneous once a day  folic acid:  orally   gabapentin 300 mg oral capsule: 1 cap(s) orally every 8 hours  latanoprost 0.005% ophthalmic solution: 1 drop(s) to each affected eye once a day (at bedtime)  levothyroxine 100 mcg (0.1 mg) oral tablet: 1 tab(s) orally once a day  mesalamine 1000 mg rectal suppository: 1 suppository(ies) rectal once a day (at bedtime) for 5 days   saccharomyces boulardii lyo 250 mg oral capsule: 1 cap(s) orally 2 times a day  simvastatin:  orally   traMADol 50 mg oral tablet: 0.5 tab(s) orally every 6 hours, As needed, Severe Pain (7 - 10)  Travatan Z 0.004% ophthalmic solution:  to each affected eye    amLODIPine 2.5 mg oral tablet: 1 tab(s) orally once a day  aspirin 81 mg oral delayed release tablet: 1 tab(s) orally once a day  Augmentin 875 mg-125 mg oral tablet: 1 tab(s) orally every 12 hours for 4 days   brimonidine ophthalmic 0.2% ophthalmic solution:  to each affected eye   celecoxib 200 mg oral capsule: 1 cap(s) orally once a day  DULoxetine 60 mg oral delayed release capsule: 1 cap(s) orally once a day  enoxaparin: 40 milligram(s) subcutaneous once a day  folic acid:  orally   gabapentin 300 mg oral capsule: 1 cap(s) orally every 8 hours  latanoprost 0.005% ophthalmic solution: 1 drop(s) to each affected eye once a day (at bedtime)  levothyroxine 112 mcg (0.112 mg) oral tablet: 1 tab(s) orally once a day  mesalamine 1000 mg rectal suppository: 1 suppository(ies) rectal once a day (at bedtime) for 5 days   polyethylene glycol 3350 oral powder for reconstitution: 17 gram(s) orally once a day  saccharomyces boulardii lyo 250 mg oral capsule: 1 cap(s) orally 2 times a day  Senna: 2 cap(s) orally once a day (at bedtime)  simvastatin:  orally   traMADol 50 mg oral tablet: 0.5 tab(s) orally every 6 hours, As needed, Severe Pain (7 - 10)  Travatan Z 0.004% ophthalmic solution:  to each affected eye

## 2020-12-20 NOTE — ED CDU PROVIDER DISPOSITION NOTE - ATTENDING CONTRIBUTION TO CARE
pt found to have pubic rami fracture s/p fall, UTI and bloody diarrhea - unable to ambulate at this time. Pt. will be admitted under the medicine service. I, Dr. Viveros, performed a face to face bedside interview with this patient regarding history of present illness, review of symptoms and relevant past medical, social and family history.  I completed an independent physical examination.  I have also reviewed the ACP's note(s) and discussed the plan with the ACP.

## 2020-12-20 NOTE — ED CDU PROVIDER DISPOSITION NOTE - CLINICAL COURSE
pt found to have pubic rami fracture s/p fall, UTI and bloody diarrhea - unable to ambulate at this time

## 2020-12-20 NOTE — DISCHARGE NOTE PROVIDER - PROVIDER TOKENS
PROVIDER:[TOKEN:[[id number]],FOLLOWUP:[Routine]] PROVIDER:[TOKEN:[[id number]],FOLLOWUP:[Routine]],PROVIDER:[TOKEN:[[id number]]]

## 2020-12-20 NOTE — H&P ADULT - NSICDXPASTMEDICALHX_GEN_ALL_CORE_FT
PAST MEDICAL HISTORY:  High cholesterol     History of recurrent UTIs     Hypothyroidism     Osteoporosis     Pelvic fracture

## 2020-12-20 NOTE — PROGRESS NOTE ADULT - NUTRITIONAL ASSESSMENT
defer to primary team   Diet, DASH/TLC:   Sodium & Cholesterol Restricted (12-19-20 @ 14:03) [Active]

## 2020-12-20 NOTE — H&P ADULT - NSHPLABSRESULTS_GEN_ALL_CORE
15.2   16.06 )-----------( 278      ( 20 Dec 2020 14:20 )             47.5   12-20    137  |  102  |  13.0  ----------------------------<  116<H>  3.9   |  22.0  |  0.39<L>    Ca    9.4      20 Dec 2020 14:20  Mg     1.9     12-20    TPro  7.2  /  Alb  4.3  /  TBili  0.8  /  DBili  x   /  AST  21  /  ALT  14  /  AlkPhos  163<H>  12-20      mr< from: MR Lumbar Spine No Cont (12.20.20 @ 10:43) >    IMPRESSION:    Insufficiency fractures of the right transverse process of L5, left sacral alae and right posterior iliac bone may be subacute to chronic. Further correlation with MRI of the pelvis may be obtained to characterize further.    No evidence of acute lumbar vertebral fracture.    No spinal canal narrowing or neural foraminal narrowing.      < end of copied text >    `ct< from: CT Pelvis Bony Only No Cont (12.19.20 @ 16:53) >    MPRESSION:    Nondisplaced fracture of the right L5 transverse process.  Nondisplaced acute fracture of the right iliac bone at the level of the sacroiliac joint extending distally, above the acetabulum.  Subacute to chronic fractures of the left sacral ala and left superior and inferior pubic rami.

## 2020-12-20 NOTE — PROVIDER CONTACT NOTE (OTHER) - BACKGROUND
RN's made aware medicated pt, with no effect pt reporting the same c/o. Declining participation with PT at this time. PT to follow up as schedule permits to complete evaluation. JEANNETTE Bishop made aware.

## 2020-12-20 NOTE — H&P ADULT - NSHPPHYSICALEXAM_GEN_ALL_CORE
Vital Signs Last 24 Hrs  T(C): 36.1 (20 Dec 2020 16:25), Max: 36.7 (20 Dec 2020 04:58)  T(F): 97 (20 Dec 2020 16:25), Max: 98.1 (20 Dec 2020 04:58)  HR: 90 (20 Dec 2020 16:25) (87 - 92)  BP: 145/85 (20 Dec 2020 16:25) (130/69 - 145/85)  BP(mean): 89 (19 Dec 2020 23:49) (89 - 89)  RR: 18 (20 Dec 2020 16:25) (18 - 18)  SpO2: 98% (20 Dec 2020 16:25) (95% - 98%)

## 2020-12-20 NOTE — DISCHARGE NOTE PROVIDER - NSDCCPCAREPLAN_GEN_ALL_CORE_FT
PRINCIPAL DISCHARGE DIAGNOSIS  Diagnosis: Acute UTI  Assessment and Plan of Treatment: continue augmentin , monitor for fever new symptoms      SECONDARY DISCHARGE DIAGNOSES  Diagnosis: Proctitis  Assessment and Plan of Treatment: continue mesalamine , stool softener    Diagnosis: Closed fracture of transverse process of lumbar vertebra, sequela  Assessment and Plan of Treatment: follow up with neurosrgeon ,cont pain medications as needed   Pt daily as tolerate    Diagnosis: Closed pelvic fracture  Assessment and Plan of Treatment: dx ed in october , stable

## 2020-12-20 NOTE — H&P ADULT - ASSESSMENT
86 yo F with h/o HTN , pelvic fracture , recurrent UTIs , admitted for leukocytosis, suspected UTI , abd pain with loose BM 's , bloody       1- Leukocytosis suspected UTI   started on iv rocephin cont   check procalcitonin ,blood cx   urine cx ordered , follow up     2- Abd pain / loose BM bloody   monitor   no ctive bleed strike of blood with BM reported by nurse   monitor h/h   GI PCR   CT of abd r/o colitis   if worsens will call GI     3- S/p fall with L spine fracture / pelvic fracture   stable pain controlled     4- HTN   cont amlodipine     5- hypothyroid   on levothyroxine    check TSH

## 2020-12-20 NOTE — DISCHARGE NOTE PROVIDER - NSDCFUADDINST_GEN_ALL_CORE_FT
ORTHOPEDIC FOLLOW UP RECOMMENDATIONS:  - Partial weight bearing left lower extremity at 25%  - physical/occupational therapy   - pain management  - follow up in office with Dr Amos for reevaluation

## 2020-12-20 NOTE — ED ADULT NURSE REASSESSMENT NOTE - NS ED NURSE REASSESS COMMENT FT1
1 episode bloody diarrhea, [facility] notified
3 episodes diarrhea pa dori contacted regarding patient status. pt c/o nausea. pa to come to bedside for eval
Pt in nad, reports feeling better after zofran. Pt noted to have large amount of loose brown stool, pt cleaned and changed and purewick changed, made comfortable on stretcher, fall precautions in place.
Pt reports relief from nausea but reports feeling too unwell to work with PT at this time, pt made aware.
Pt resting comfortably in bed VSS. Pt denies any pain at this time no complaint for RN will continue to monitor.
patient aox4 comfortable in appearance. respirations clear equal and unlabored. heart sounds s1 s2  WDL, abdomen soft nontender + bowel sounds all 4 quadrants, moves all extremities. + pulse all 4 extremities. + sensation all 4 extremities. patient and family updated on plan of care. patient and family educated on hourly rounding procedures and understands call bell system.
Assumed care of the Pt at 2000. Verbal report received from ASHLEE Reardon. Pt is AOx4 in no acute distress. Denies any chest pain, dizziness and SOB at this time. Pt states she fell three days ago injuring right pelvis. Pt denies any pain at this time. Pt able with full range of motion in extremity. Pt breathing is even and unlabored, VSS. Pt in understanding. Pt pending PT and SW consult in the AM. Wait time explained, plan of care explained, Pt in understanding of plan of care will continue to monitor.

## 2020-12-20 NOTE — DISCHARGE NOTE PROVIDER - HOSPITAL COURSE
Patient is a 87 year old right hand dominant female with a past medical history significant for left pubic rami fracture from October 2020, HTN, HLD, hypothyroidism, osteoporosis, and right knee replacement status post fall Wednesday 12/16/2020.   MRI LS obtained and reviewed reports Insufficiency fractures of the right transverse process of L5, left sacral alae and right posterior iliac bone may be subacute to chronic. Further correlation with MRI of the pelvis may be obtained to characterize further. No evidence of acute lumbar vertebral fracture. No spinal canal narrowing or neural foraminal narrowing.  OOB w assist/ PT per nsx & WB status as per ortho, no brace needed from Nsx standpoint , no intervention recommended kept in observation unit to discharge to [facility] day 2 pt was having nausea , loose BM with blood strikes , had cbc BMP showed leukocytoisis , UA suspected UTI admitted for UTI and further observation   CT of abd performed showed proctitis , mesalamine suppository ordered , leukocytosis improving with rocephin , no further bloody BM , she is stable for discharge to [facility]     total time spend 35 min coordinating care     f/u OP w Dr Jin neurosurgeon for L spine fracture  in 2-4 weeks

## 2020-12-20 NOTE — DISCHARGE NOTE PROVIDER - NSDCFUADDAPPT_GEN_ALL_CORE_FT
call to make an apt w Dr Jin to follow-up Insufficiency fractures of the right transverse process of L5 subacute/chronic as per MRI 12/20/20

## 2020-12-20 NOTE — DISCHARGE NOTE PROVIDER - CARE PROVIDER_API CALL
Onel Jin  NEUROSURGERY  [address] [place] [address]  Phone: [phone]  Fax: [phone]  Follow Up Time: Routine   Onel Jin  NEUROSURGERY  [address] [place] [address]  Phone: [phone]  Fax: [phone]  Follow Up Time: Routine    Farhan Prakash ()  Orthopaedic Surgery  [address] [place] [address]  Phone: [phone]  Fax: [phone]  Follow Up Time:

## 2020-12-20 NOTE — PROVIDER CONTACT NOTE (OTHER) - SITUATION
chart reviewed, contents noted, orders received. Per order- PT evaluation on hold until pt receives MRI. PT to follow up as schedule permits & as orders allow. Thank you
Chart reviewed, contents noted, orders received. PT evaluation not completed at this time as pt reporting she is not feeling well - PT attempted twice, both with c/o of nausea.

## 2020-12-20 NOTE — H&P ADULT - HISTORY OF PRESENT ILLNESS
Patient is a 87 year old right hand dominant female with a past medical history significant for left pubic rami fracture from October 2020, HTN, HLD, hypothyroidism, osteoporosis, and right knee replacement status post fall Wednesday 12/16/2020.   MRI LS obtained and reviewed reports Insufficiency fractures of the right transverse process of L5, left sacral alae and right posterior iliac bone may be subacute to chronic. Further correlation with MRI of the pelvis may be obtained to characterize further. No evidence of acute lumbar vertebral fracture , pt today had abd pain was nauseas after MR and noted to have loose BM , with bloody strikes , labs ordered showed leukocytosis and UA positive which is now admitted for bloody BM and suspected UTI

## 2020-12-20 NOTE — ED CDU PROVIDER SUBSEQUENT DAY NOTE - ATTENDING CONTRIBUTION TO CARE
No acute events overnight. Pt w/o complaints at time of assessment. Pt. waiting to go to MRI. I, Dr. Viveros, performed a face to face bedside interview with this patient regarding history of present illness, review of symptoms and relevant past medical, social and family history.  I completed an independent physical examination.  I have also reviewed the ACP's note(s) and discussed the plan with the ACP.

## 2020-12-21 LAB
ANION GAP SERPL CALC-SCNC: 10 MMOL/L — SIGNIFICANT CHANGE UP (ref 5–17)
BUN SERPL-MCNC: 11 MG/DL — SIGNIFICANT CHANGE UP (ref 8–20)
CALCIUM SERPL-MCNC: 8.6 MG/DL — SIGNIFICANT CHANGE UP (ref 8.6–10.2)
CHLORIDE SERPL-SCNC: 105 MMOL/L — SIGNIFICANT CHANGE UP (ref 98–107)
CO2 SERPL-SCNC: 23 MMOL/L — SIGNIFICANT CHANGE UP (ref 22–29)
CREAT SERPL-MCNC: 0.39 MG/DL — LOW (ref 0.5–1.3)
CULTURE RESULTS: SIGNIFICANT CHANGE UP
GLUCOSE SERPL-MCNC: 100 MG/DL — HIGH (ref 70–99)
HCT VFR BLD CALC: 44 % — SIGNIFICANT CHANGE UP (ref 34.5–45)
HGB BLD-MCNC: 13.9 G/DL — SIGNIFICANT CHANGE UP (ref 11.5–15.5)
MAGNESIUM SERPL-MCNC: 1.9 MG/DL — SIGNIFICANT CHANGE UP (ref 1.6–2.6)
MCHC RBC-ENTMCNC: 29.3 PG — SIGNIFICANT CHANGE UP (ref 27–34)
MCHC RBC-ENTMCNC: 31.6 GM/DL — LOW (ref 32–36)
MCV RBC AUTO: 92.8 FL — SIGNIFICANT CHANGE UP (ref 80–100)
PHOSPHATE SERPL-MCNC: 2.9 MG/DL — SIGNIFICANT CHANGE UP (ref 2.4–4.7)
PLATELET # BLD AUTO: 277 K/UL — SIGNIFICANT CHANGE UP (ref 150–400)
POTASSIUM SERPL-MCNC: 3.7 MMOL/L — SIGNIFICANT CHANGE UP (ref 3.5–5.3)
POTASSIUM SERPL-SCNC: 3.7 MMOL/L — SIGNIFICANT CHANGE UP (ref 3.5–5.3)
PROCALCITONIN SERPL-MCNC: 0.16 NG/ML — HIGH (ref 0.02–0.1)
RBC # BLD: 4.74 M/UL — SIGNIFICANT CHANGE UP (ref 3.8–5.2)
RBC # FLD: 13.6 % — SIGNIFICANT CHANGE UP (ref 10.3–14.5)
SARS-COV-2 IGG SERPL QL IA: NEGATIVE — SIGNIFICANT CHANGE UP
SARS-COV-2 IGM SERPL IA-ACNC: <0.1 INDEX — SIGNIFICANT CHANGE UP
SODIUM SERPL-SCNC: 138 MMOL/L — SIGNIFICANT CHANGE UP (ref 135–145)
SPECIMEN SOURCE: SIGNIFICANT CHANGE UP
WBC # BLD: 10.41 K/UL — SIGNIFICANT CHANGE UP (ref 3.8–10.5)
WBC # FLD AUTO: 10.41 K/UL — SIGNIFICANT CHANGE UP (ref 3.8–10.5)

## 2020-12-21 PROCEDURE — 99232 SBSQ HOSP IP/OBS MODERATE 35: CPT

## 2020-12-21 RX ORDER — HYDROCORTISONE 1 %
1 OINTMENT (GRAM) TOPICAL
Refills: 0 | Status: DISCONTINUED | OUTPATIENT
Start: 2020-12-21 | End: 2020-12-22

## 2020-12-21 RX ORDER — ENOXAPARIN SODIUM 100 MG/ML
40 INJECTION SUBCUTANEOUS DAILY
Refills: 0 | Status: DISCONTINUED | OUTPATIENT
Start: 2020-12-21 | End: 2020-12-23

## 2020-12-21 RX ADMIN — BRIMONIDINE TARTRATE 1 DROP(S): 2 SOLUTION/ DROPS OPHTHALMIC at 07:08

## 2020-12-21 RX ADMIN — CEFTRIAXONE 100 MILLIGRAM(S): 500 INJECTION, POWDER, FOR SOLUTION INTRAMUSCULAR; INTRAVENOUS at 18:35

## 2020-12-21 RX ADMIN — Medication 1 APPLICATION(S): at 18:35

## 2020-12-21 RX ADMIN — Medication 100 MICROGRAM(S): at 06:02

## 2020-12-21 RX ADMIN — DULOXETINE HYDROCHLORIDE 60 MILLIGRAM(S): 30 CAPSULE, DELAYED RELEASE ORAL at 15:46

## 2020-12-21 RX ADMIN — ENOXAPARIN SODIUM 40 MILLIGRAM(S): 100 INJECTION SUBCUTANEOUS at 15:45

## 2020-12-21 RX ADMIN — SIMVASTATIN 20 MILLIGRAM(S): 20 TABLET, FILM COATED ORAL at 22:17

## 2020-12-21 RX ADMIN — GABAPENTIN 200 MILLIGRAM(S): 400 CAPSULE ORAL at 06:01

## 2020-12-21 RX ADMIN — Medication 1 MILLIGRAM(S): at 15:45

## 2020-12-21 RX ADMIN — LATANOPROST 1 DROP(S): 0.05 SOLUTION/ DROPS OPHTHALMIC; TOPICAL at 22:15

## 2020-12-21 RX ADMIN — AMLODIPINE BESYLATE 2.5 MILLIGRAM(S): 2.5 TABLET ORAL at 06:02

## 2020-12-21 RX ADMIN — GABAPENTIN 200 MILLIGRAM(S): 400 CAPSULE ORAL at 15:45

## 2020-12-21 RX ADMIN — BRIMONIDINE TARTRATE 1 DROP(S): 2 SOLUTION/ DROPS OPHTHALMIC at 18:36

## 2020-12-21 RX ADMIN — GABAPENTIN 200 MILLIGRAM(S): 400 CAPSULE ORAL at 22:17

## 2020-12-21 RX ADMIN — Medication 1 APPLICATION(S): at 12:46

## 2020-12-21 NOTE — PROGRESS NOTE ADULT - SUBJECTIVE AND OBJECTIVE BOX
Internal Medicine Hospitalist Progress Note                    ROS: as above, all remaining ROS are negative.       BACKGROUND:  MEDICATIONS  (STANDING):  amLODIPine   Tablet 2.5 milliGRAM(s) Oral daily  brimonidine 0.2% Ophthalmic Solution 1 Drop(s) Both EYES two times a day  cefTRIAXone   IVPB 1000 milliGRAM(s) IV Intermittent every 24 hours  DULoxetine 60 milliGRAM(s) Oral daily  enoxaparin Injectable 40 milliGRAM(s) SubCutaneous daily  folic acid 1 milliGRAM(s) Oral daily  gabapentin 200 milliGRAM(s) Oral every 8 hours  hydrocortisone 2.5% Rectal Cream 1 Application(s) Rectal two times a day  latanoprost 0.005% Ophthalmic Solution 1 Drop(s) Both EYES at bedtime  levothyroxine 100 MICROGram(s) Oral daily  senna 2 Tablet(s) Oral at bedtime  simvastatin 20 milliGRAM(s) Oral at bedtime    MEDICATIONS  (PRN):  ibuprofen  Tablet. 200 milliGRAM(s) Oral every 6 hours PRN Moderate Pain (4 - 6)    Allergies    Tylenol (Unknown)    Intolerances            VITALS:  Vital Signs Last 24 Hrs  T(C): 36.2 (21 Dec 2020 07:32), Max: 36.9 (20 Dec 2020 19:33)  T(F): 97.2 (21 Dec 2020 07:32), Max: 98.5 (20 Dec 2020 19:33)  HR: 89 (21 Dec 2020 07:32) (89 - 99)  BP: 94/64 (21 Dec 2020 07:32) (94/64 - 145/85)  BP(mean): 82 (21 Dec 2020 04:21) (82 - 100)  RR: 17 (21 Dec 2020 07:32) (16 - 18)  SpO2: 92% (21 Dec 2020 07:32) (92% - 98%) Daily     Daily   CAPILLARY BLOOD GLUCOSE        I&O's Summary      PHYSICAL EXAM:      Constitutional:    Eyes:    ENMT:    Neck:    Breasts:    Back:    Respiratory:    Cardiovascular:    Gastrointestinal:    Genitourinary:    Rectal:    Extremities:    Vascular:    Neurological:    Skin:    Lymph Nodes:    Musculoskeletal:    Psychiatric:          LABS:                        13.9   10.41 )-----------( 277      ( 21 Dec 2020 07:41 )             44.0     12-21    138  |  105  |  11.0  ----------------------------<  100<H>  3.7   |  23.0  |  0.39<L>    Ca    8.6      21 Dec 2020 07:41  Phos  2.9     12-21  Mg     1.9     12-21    TPro  7.2  /  Alb  4.3  /  TBili  0.8  /  DBili  x   /  AST  21  /  ALT  14  /  AlkPhos  163<H>  12-20        Radiology :           Internal Medicine Hospitalist Progress Note    pt is seen in am , she was feeling well, no diarrhea '  no nausea         ROS: as above, all remaining ROS are negative.       BACKGROUND:  MEDICATIONS  (STANDING):  amLODIPine   Tablet 2.5 milliGRAM(s) Oral daily  brimonidine 0.2% Ophthalmic Solution 1 Drop(s) Both EYES two times a day  cefTRIAXone   IVPB 1000 milliGRAM(s) IV Intermittent every 24 hours  DULoxetine 60 milliGRAM(s) Oral daily  enoxaparin Injectable 40 milliGRAM(s) SubCutaneous daily  folic acid 1 milliGRAM(s) Oral daily  gabapentin 200 milliGRAM(s) Oral every 8 hours  hydrocortisone 2.5% Rectal Cream 1 Application(s) Rectal two times a day  latanoprost 0.005% Ophthalmic Solution 1 Drop(s) Both EYES at bedtime  levothyroxine 100 MICROGram(s) Oral daily  senna 2 Tablet(s) Oral at bedtime  simvastatin 20 milliGRAM(s) Oral at bedtime    MEDICATIONS  (PRN):  ibuprofen  Tablet. 200 milliGRAM(s) Oral every 6 hours PRN Moderate Pain (4 - 6)    Allergies    Tylenol (Unknown)    Intolerances            VITALS:  Vital Signs Last 24 Hrs  T(C): 36.2 (21 Dec 2020 07:32), Max: 36.9 (20 Dec 2020 19:33)  T(F): 97.2 (21 Dec 2020 07:32), Max: 98.5 (20 Dec 2020 19:33)  HR: 89 (21 Dec 2020 07:32) (89 - 99)  BP: 94/64 (21 Dec 2020 07:32) (94/64 - 145/85)  BP(mean): 82 (21 Dec 2020 04:21) (82 - 100)  RR: 17 (21 Dec 2020 07:32) (16 - 18)  SpO2: 92% (21 Dec 2020 07:32) (92% - 98%) Daily     Daily   CAPILLARY BLOOD GLUCOSE        I&O's Summary      PHYSICAL EXAM:      Constitutional: awake ,alert , no distress     Neck: supple , no JVD     Respiratory: CTA bilateral     Cardiovascular: regular s1 /s2     Gastrointestinal: soft no tenderness , BS positive     Extremities: no pretibial edema           LABS:                        13.9   10.41 )-----------( 277      ( 21 Dec 2020 07:41 )             44.0     12-21    138  |  105  |  11.0  ----------------------------<  100<H>  3.7   |  23.0  |  0.39<L>    Ca    8.6      21 Dec 2020 07:41  Phos  2.9     12-21  Mg     1.9     12-21    TPro  7.2  /  Alb  4.3  /  TBili  0.8  /  DBili  x   /  AST  21  /  ALT  14  /  AlkPhos  163<H>  12-20        Radiology :

## 2020-12-21 NOTE — PHYSICAL THERAPY INITIAL EVALUATION ADULT - ADDITIONAL COMMENTS
Pt reports living in a 1 story house with ramp to enter, with son and daughter-in-law.  Reports family is present and able assist. PTA, amb with RW and Modified Independent for ADLs and sponge bathing.

## 2020-12-22 LAB
HCT VFR BLD CALC: 40 % — SIGNIFICANT CHANGE UP (ref 34.5–45)
HGB BLD-MCNC: 12.8 G/DL — SIGNIFICANT CHANGE UP (ref 11.5–15.5)
MCHC RBC-ENTMCNC: 29.6 PG — SIGNIFICANT CHANGE UP (ref 27–34)
MCHC RBC-ENTMCNC: 32 GM/DL — SIGNIFICANT CHANGE UP (ref 32–36)
MCV RBC AUTO: 92.6 FL — SIGNIFICANT CHANGE UP (ref 80–100)
PLATELET # BLD AUTO: 240 K/UL — SIGNIFICANT CHANGE UP (ref 150–400)
RBC # BLD: 4.32 M/UL — SIGNIFICANT CHANGE UP (ref 3.8–5.2)
RBC # FLD: 13.6 % — SIGNIFICANT CHANGE UP (ref 10.3–14.5)
WBC # BLD: 9.62 K/UL — SIGNIFICANT CHANGE UP (ref 3.8–10.5)
WBC # FLD AUTO: 9.62 K/UL — SIGNIFICANT CHANGE UP (ref 3.8–10.5)

## 2020-12-22 PROCEDURE — 99232 SBSQ HOSP IP/OBS MODERATE 35: CPT

## 2020-12-22 RX ORDER — SACCHAROMYCES BOULARDII 250 MG
250 POWDER IN PACKET (EA) ORAL
Refills: 0 | Status: DISCONTINUED | OUTPATIENT
Start: 2020-12-22 | End: 2020-12-23

## 2020-12-22 RX ORDER — MESALAMINE 400 MG
1000 TABLET, DELAYED RELEASE (ENTERIC COATED) ORAL AT BEDTIME
Refills: 0 | Status: DISCONTINUED | OUTPATIENT
Start: 2020-12-22 | End: 2020-12-23

## 2020-12-22 RX ADMIN — CEFTRIAXONE 100 MILLIGRAM(S): 500 INJECTION, POWDER, FOR SOLUTION INTRAMUSCULAR; INTRAVENOUS at 16:52

## 2020-12-22 RX ADMIN — Medication 100 MICROGRAM(S): at 05:55

## 2020-12-22 RX ADMIN — BRIMONIDINE TARTRATE 1 DROP(S): 2 SOLUTION/ DROPS OPHTHALMIC at 05:56

## 2020-12-22 RX ADMIN — SENNA PLUS 2 TABLET(S): 8.6 TABLET ORAL at 22:33

## 2020-12-22 RX ADMIN — GABAPENTIN 200 MILLIGRAM(S): 400 CAPSULE ORAL at 22:35

## 2020-12-22 RX ADMIN — BRIMONIDINE TARTRATE 1 DROP(S): 2 SOLUTION/ DROPS OPHTHALMIC at 17:18

## 2020-12-22 RX ADMIN — Medication 250 MILLIGRAM(S): at 17:18

## 2020-12-22 RX ADMIN — DULOXETINE HYDROCHLORIDE 60 MILLIGRAM(S): 30 CAPSULE, DELAYED RELEASE ORAL at 11:19

## 2020-12-22 RX ADMIN — GABAPENTIN 200 MILLIGRAM(S): 400 CAPSULE ORAL at 13:10

## 2020-12-22 RX ADMIN — ENOXAPARIN SODIUM 40 MILLIGRAM(S): 100 INJECTION SUBCUTANEOUS at 11:19

## 2020-12-22 RX ADMIN — Medication 1 MILLIGRAM(S): at 11:19

## 2020-12-22 RX ADMIN — Medication 1 APPLICATION(S): at 05:56

## 2020-12-22 RX ADMIN — Medication 1000 MILLIGRAM(S): at 22:33

## 2020-12-22 RX ADMIN — SIMVASTATIN 20 MILLIGRAM(S): 20 TABLET, FILM COATED ORAL at 22:33

## 2020-12-22 RX ADMIN — LATANOPROST 1 DROP(S): 0.05 SOLUTION/ DROPS OPHTHALMIC; TOPICAL at 22:33

## 2020-12-22 RX ADMIN — GABAPENTIN 200 MILLIGRAM(S): 400 CAPSULE ORAL at 05:56

## 2020-12-22 RX ADMIN — AMLODIPINE BESYLATE 2.5 MILLIGRAM(S): 2.5 TABLET ORAL at 05:55

## 2020-12-22 NOTE — PROGRESS NOTE ADULT - SUBJECTIVE AND OBJECTIVE BOX
Internal Medicine Hospitalist Progress Note    CC : nausea vomiting   no new complaints   she is seen examined in am             ROS: as above, all remaining ROS are negative.       BACKGROUND:  MEDICATIONS  (STANDING):  amLODIPine   Tablet 2.5 milliGRAM(s) Oral daily  brimonidine 0.2% Ophthalmic Solution 1 Drop(s) Both EYES two times a day  cefTRIAXone   IVPB 1000 milliGRAM(s) IV Intermittent every 24 hours  DULoxetine 60 milliGRAM(s) Oral daily  enoxaparin Injectable 40 milliGRAM(s) SubCutaneous daily  folic acid 1 milliGRAM(s) Oral daily  gabapentin 200 milliGRAM(s) Oral every 8 hours  hydrocortisone 2.5% Rectal Cream 1 Application(s) Rectal two times a day  latanoprost 0.005% Ophthalmic Solution 1 Drop(s) Both EYES at bedtime  levothyroxine 100 MICROGram(s) Oral daily  senna 2 Tablet(s) Oral at bedtime  simvastatin 20 milliGRAM(s) Oral at bedtime    MEDICATIONS  (PRN):  ibuprofen  Tablet. 200 milliGRAM(s) Oral every 6 hours PRN Moderate Pain (4 - 6)    Allergies    Tylenol (Unknown)    Intolerances            VITALS:  Vital Signs Last 24 Hrs  T(C): 36.2 (21 Dec 2020 07:32), Max: 36.9 (20 Dec 2020 19:33)  T(F): 97.2 (21 Dec 2020 07:32), Max: 98.5 (20 Dec 2020 19:33)  HR: 89 (21 Dec 2020 07:32) (89 - 99)  BP: 94/64 (21 Dec 2020 07:32) (94/64 - 145/85)  BP(mean): 82 (21 Dec 2020 04:21) (82 - 100)  RR: 17 (21 Dec 2020 07:32) (16 - 18)  SpO2: 92% (21 Dec 2020 07:32) (92% - 98%) Daily     Daily   CAPILLARY BLOOD GLUCOSE        I&O's Summary      PHYSICAL EXAM:      Constitutional: awake alert , no distress     Neck: supple , no JVD     Respiratory: CTA bilateral     Cardiovascular: regular s1 /s2     Gastrointestinal: soft no tenderness , BS positive     Extremities: no pretibial edema                               12.8   9.62  )-----------( 240      ( 22 Dec 2020 10:40 )             40.0   12-21    138  |  105  |  11.0  ----------------------------<  100<H>  3.7   |  23.0  |  0.39<L>    Ca    8.6      21 Dec 2020 07:41  Phos  2.9     12-21  Mg     1.9     12-21        LABS:                  cuCulture - Urine (12.21.20 @ 03:35)   Specimen Source: .Urine Clean Catch (Midstream)   Culture Results:   10,000 - 49,000 CFU/mL Escherichia coli   50,000 - 99,000 CFU/mL Aerococcus species   "Aerococcus spp. are predictably susceptible to penicillin,   ampicillin, tetracycline, and vancomycin. All isolates are   resistant to sulfonamides"

## 2020-12-23 ENCOUNTER — TRANSCRIPTION ENCOUNTER (OUTPATIENT)
Age: 85
End: 2020-12-23

## 2020-12-23 VITALS
DIASTOLIC BLOOD PRESSURE: 75 MMHG | RESPIRATION RATE: 18 BRPM | TEMPERATURE: 98 F | SYSTOLIC BLOOD PRESSURE: 115 MMHG | OXYGEN SATURATION: 94 % | HEART RATE: 99 BPM

## 2020-12-23 DIAGNOSIS — S32.9XXA FRACTURE OF UNSPECIFIED PARTS OF LUMBOSACRAL SPINE AND PELVIS, INITIAL ENCOUNTER FOR CLOSED FRACTURE: ICD-10-CM

## 2020-12-23 PROCEDURE — 99239 HOSP IP/OBS DSCHRG MGMT >30: CPT

## 2020-12-23 RX ORDER — MESALAMINE 400 MG
1 TABLET, DELAYED RELEASE (ENTERIC COATED) ORAL
Qty: 0 | Refills: 0 | DISCHARGE
Start: 2020-12-23

## 2020-12-23 RX ORDER — AMLODIPINE BESYLATE 2.5 MG/1
1 TABLET ORAL
Qty: 0 | Refills: 0 | DISCHARGE
Start: 2020-12-23

## 2020-12-23 RX ORDER — TRAMADOL HYDROCHLORIDE 50 MG/1
25 TABLET ORAL EVERY 6 HOURS
Refills: 0 | Status: DISCONTINUED | OUTPATIENT
Start: 2020-12-23 | End: 2020-12-23

## 2020-12-23 RX ORDER — LATANOPROST 0.05 MG/ML
1 SOLUTION/ DROPS OPHTHALMIC; TOPICAL
Qty: 0 | Refills: 0 | DISCHARGE
Start: 2020-12-23

## 2020-12-23 RX ORDER — CELECOXIB 200 MG/1
200 CAPSULE ORAL DAILY
Refills: 0 | Status: DISCONTINUED | OUTPATIENT
Start: 2020-12-23 | End: 2020-12-23

## 2020-12-23 RX ORDER — TRAMADOL HYDROCHLORIDE 50 MG/1
0.5 TABLET ORAL
Qty: 0 | Refills: 0 | DISCHARGE
Start: 2020-12-23

## 2020-12-23 RX ORDER — LEVOTHYROXINE SODIUM 125 MCG
1 TABLET ORAL
Qty: 0 | Refills: 0 | DISCHARGE
Start: 2020-12-23

## 2020-12-23 RX ORDER — DULOXETINE HYDROCHLORIDE 30 MG/1
1 CAPSULE, DELAYED RELEASE ORAL
Qty: 0 | Refills: 0 | DISCHARGE
Start: 2020-12-23

## 2020-12-23 RX ORDER — SODIUM CHLORIDE 9 MG/ML
1000 INJECTION INTRAMUSCULAR; INTRAVENOUS; SUBCUTANEOUS
Refills: 0 | Status: DISCONTINUED | OUTPATIENT
Start: 2020-12-23 | End: 2020-12-23

## 2020-12-23 RX ORDER — POLYETHYLENE GLYCOL 3350 17 G/17G
17 POWDER, FOR SOLUTION ORAL
Qty: 0 | Refills: 0 | DISCHARGE
Start: 2020-12-23

## 2020-12-23 RX ORDER — ASPIRIN/CALCIUM CARB/MAGNESIUM 324 MG
81 TABLET ORAL DAILY
Refills: 0 | Status: DISCONTINUED | OUTPATIENT
Start: 2020-12-23 | End: 2020-12-23

## 2020-12-23 RX ORDER — POLYETHYLENE GLYCOL 3350 17 G/17G
17 POWDER, FOR SOLUTION ORAL DAILY
Refills: 0 | Status: DISCONTINUED | OUTPATIENT
Start: 2020-12-23 | End: 2020-12-23

## 2020-12-23 RX ORDER — LIDOCAINE 4 G/100G
1 CREAM TOPICAL DAILY
Refills: 0 | Status: DISCONTINUED | OUTPATIENT
Start: 2020-12-23 | End: 2020-12-23

## 2020-12-23 RX ORDER — ENOXAPARIN SODIUM 100 MG/ML
40 INJECTION SUBCUTANEOUS
Qty: 0 | Refills: 0 | DISCHARGE
Start: 2020-12-23

## 2020-12-23 RX ORDER — SACCHAROMYCES BOULARDII 250 MG
1 POWDER IN PACKET (EA) ORAL
Qty: 0 | Refills: 0 | DISCHARGE
Start: 2020-12-23

## 2020-12-23 RX ORDER — ASPIRIN/CALCIUM CARB/MAGNESIUM 324 MG
1 TABLET ORAL
Qty: 0 | Refills: 0 | DISCHARGE
Start: 2020-12-23

## 2020-12-23 RX ADMIN — DULOXETINE HYDROCHLORIDE 60 MILLIGRAM(S): 30 CAPSULE, DELAYED RELEASE ORAL at 11:51

## 2020-12-23 RX ADMIN — BRIMONIDINE TARTRATE 1 DROP(S): 2 SOLUTION/ DROPS OPHTHALMIC at 17:10

## 2020-12-23 RX ADMIN — Medication 200 MILLIGRAM(S): at 10:48

## 2020-12-23 RX ADMIN — Medication 200 MILLIGRAM(S): at 10:45

## 2020-12-23 RX ADMIN — AMLODIPINE BESYLATE 2.5 MILLIGRAM(S): 2.5 TABLET ORAL at 06:12

## 2020-12-23 RX ADMIN — Medication 1 MILLIGRAM(S): at 11:51

## 2020-12-23 RX ADMIN — POLYETHYLENE GLYCOL 3350 17 GRAM(S): 17 POWDER, FOR SOLUTION ORAL at 12:00

## 2020-12-23 RX ADMIN — Medication 100 MICROGRAM(S): at 06:12

## 2020-12-23 RX ADMIN — ENOXAPARIN SODIUM 40 MILLIGRAM(S): 100 INJECTION SUBCUTANEOUS at 11:51

## 2020-12-23 RX ADMIN — BRIMONIDINE TARTRATE 1 DROP(S): 2 SOLUTION/ DROPS OPHTHALMIC at 06:11

## 2020-12-23 RX ADMIN — GABAPENTIN 200 MILLIGRAM(S): 400 CAPSULE ORAL at 06:12

## 2020-12-23 RX ADMIN — CEFTRIAXONE 100 MILLIGRAM(S): 500 INJECTION, POWDER, FOR SOLUTION INTRAMUSCULAR; INTRAVENOUS at 17:10

## 2020-12-23 RX ADMIN — Medication 250 MILLIGRAM(S): at 06:12

## 2020-12-23 RX ADMIN — Medication 250 MILLIGRAM(S): at 17:10

## 2020-12-23 RX ADMIN — Medication 81 MILLIGRAM(S): at 12:00

## 2020-12-23 NOTE — PROGRESS NOTE ADULT - ASSESSMENT
86 yo F with h/o HTN , pelvic fracture , recurrent UTIs , admitted for leukocytosis, suspected UTI , abd pain with loose BM 's , bloody       1- Leukocytosis suspected UTI   cont v rocephin cont   check procalcitonin ,blood cx   urine cx ordered , follow up     2- Abd pain / loose BM bloody / proctitis on Ct scan   cont anusol suppository   improved   monitor BM and H/H     3- S/p fall with L spine fracture / pelvic fracture   stable pain controlled     4- HTN   cont amlodipine     5- hypothyroid   on levothyroxine    TSH is high will increase ose of levothyroxine       
86 yo F with h/o HTN , pelvic fracture , recurrent UTIs , admitted for leukocytosis, suspected UTI , had bloody BM CT of bad pelvis showed proctitis on mesalamine suppository , no further blood y BM   HB remains stable, treated with iv rocephin for UTI leukocytosis improved         1- Leukocytosis due to UTI   improved   cont rocephin will change to po augmentin on discharge       2- Abd pain / loose BM bloody likely due to  proctitis seen  on Ct scan   cont mesalamine suppository   avoid constipation   cont senna , miralax   improved symptoms     3- S/p fall with L spine fracture / pelvic fracture   stable pain controlled   add lidoderm patch       4- HTN   cont amlodipine \stable     5- hypothyroid   on levothyroxine    increased dose TSH is high     discharge  to [facility]   SW is aware needs authirization       
88 yo F with h/o HTN , pelvic fracture , recurrent UTIs , admitted for leukocytosis, suspected UTI , abd pain with bloody BM 's 12/20       1- suspected UTI  with leukocytosis   on empirical iv rocephin  blood cx ordered     2- Abd pain / loose BM bloody / proctitis on Ct scan   cont steroid suppository   monitor BM and H/H     3- S/p fall with L spine fracture / pelvic fracture   stable pain controlled   will need [facility]   PT as tolerate     4- HTN   cont amlodipine     DVT prophylaxis   
Patient is a 87 year old right hand dominant female with a past medical history significant for left pubic rami fracture from October 2020, HTN, HLD, hypothyroidism, osteoporosis, and right knee replacement status post fall Wednesday 12/16/2020.   MRI LS obtained and reviewed reports Insufficiency fractures of the right transverse process of L5, left sacral alae and right posterior iliac bone may be subacute to chronic. Further correlation with MRI of the pelvis may be obtained to characterize further. No evidence of acute lumbar vertebral fracture. No spinal canal narrowing or neural foraminal narrowing.  OOB w assist/ PT per nsx & WB status as per ortho, no brace needed from Nsx standpoint   no Nsx intervention or recommendations   f/u OP w Dr Jin in 2-4 weeks

## 2020-12-23 NOTE — PROGRESS NOTE ADULT - SUBJECTIVE AND OBJECTIVE BOX
Internal Medicine Hospitalist Progress Note    Follow up for UTI , proctitis     pt is seen in am , c/o lower back pain   no diarrhea or bloody BM since sunday   denies ant abd pain     Vital Signs Last 24 Hrs  T(C): 37.1 (23 Dec 2020 07:43), Max: 37.1 (23 Dec 2020 07:43)  T(F): 98.7 (23 Dec 2020 07:43), Max: 98.7 (23 Dec 2020 07:43)  HR: 91 (23 Dec 2020 07:43) (91 - 97)  BP: 101/66 (23 Dec 2020 07:43) (101/66 - 113/76)  BP(mean): --  RR: 18 (23 Dec 2020 07:43) (18 - 18)  SpO2: 94% (23 Dec 2020 07:43) (93% - 94%)        Constitutional: awake alert , no distress     Neck: supple , no JVD     Respiratory: CTA bilateral , no wheezing     Cardiovascular: regular s1 /s2     Gastrointestinal: soft no tenderness , BS positive     Extremities: no pretibial edema                             12.8   9.62  )-----------( 240      ( 22 Dec 2020 10:40 )             40.0

## 2020-12-23 NOTE — DISCHARGE NOTE NURSING/CASE MANAGEMENT/SOCIAL WORK - PATIENT PORTAL LINK FT
You can access the FollowMyHealth Patient Portal offered by [facility] by registering at the following website: [email or web address]. By joining [employer]’s FollowMyHealth portal, you will also be able to view your health information using other applications (apps) compatible with our system.

## 2020-12-25 LAB
CULTURE RESULTS: SIGNIFICANT CHANGE UP
CULTURE RESULTS: SIGNIFICANT CHANGE UP
SPECIMEN SOURCE: SIGNIFICANT CHANGE UP
SPECIMEN SOURCE: SIGNIFICANT CHANGE UP

## 2020-12-28 PROCEDURE — 74176 CT ABD & PELVIS W/O CONTRAST: CPT

## 2020-12-28 PROCEDURE — G0378: CPT

## 2020-12-28 PROCEDURE — 84100 ASSAY OF PHOSPHORUS: CPT

## 2020-12-28 PROCEDURE — 85014 HEMATOCRIT: CPT

## 2020-12-28 PROCEDURE — 85027 COMPLETE CBC AUTOMATED: CPT

## 2020-12-28 PROCEDURE — 85018 HEMOGLOBIN: CPT

## 2020-12-28 PROCEDURE — 36415 COLL VENOUS BLD VENIPUNCTURE: CPT

## 2020-12-28 PROCEDURE — 87086 URINE CULTURE/COLONY COUNT: CPT

## 2020-12-28 PROCEDURE — 81001 URINALYSIS AUTO W/SCOPE: CPT

## 2020-12-28 PROCEDURE — 87507 IADNA-DNA/RNA PROBE TQ 12-25: CPT

## 2020-12-28 PROCEDURE — 83735 ASSAY OF MAGNESIUM: CPT

## 2020-12-28 PROCEDURE — 73502 X-RAY EXAM HIP UNI 2-3 VIEWS: CPT

## 2020-12-28 PROCEDURE — 97163 PT EVAL HIGH COMPLEX 45 MIN: CPT

## 2020-12-28 PROCEDURE — 72131 CT LUMBAR SPINE W/O DYE: CPT

## 2020-12-28 PROCEDURE — 97116 GAIT TRAINING THERAPY: CPT

## 2020-12-28 PROCEDURE — U0003: CPT

## 2020-12-28 PROCEDURE — 99285 EMERGENCY DEPT VISIT HI MDM: CPT | Mod: 25

## 2020-12-28 PROCEDURE — 80048 BASIC METABOLIC PNL TOTAL CA: CPT

## 2020-12-28 PROCEDURE — 84443 ASSAY THYROID STIM HORMONE: CPT

## 2020-12-28 PROCEDURE — 84145 PROCALCITONIN (PCT): CPT

## 2020-12-28 PROCEDURE — 80053 COMPREHEN METABOLIC PANEL: CPT

## 2020-12-28 PROCEDURE — 86769 SARS-COV-2 COVID-19 ANTIBODY: CPT

## 2020-12-28 PROCEDURE — 96375 TX/PRO/DX INJ NEW DRUG ADDON: CPT

## 2020-12-28 PROCEDURE — 72192 CT PELVIS W/O DYE: CPT

## 2020-12-28 PROCEDURE — 83690 ASSAY OF LIPASE: CPT

## 2020-12-28 PROCEDURE — 85025 COMPLETE CBC W/AUTO DIFF WBC: CPT

## 2020-12-28 PROCEDURE — 96374 THER/PROPH/DIAG INJ IV PUSH: CPT

## 2020-12-28 PROCEDURE — 87040 BLOOD CULTURE FOR BACTERIA: CPT

## 2020-12-28 PROCEDURE — 72170 X-RAY EXAM OF PELVIS: CPT

## 2020-12-28 PROCEDURE — 97530 THERAPEUTIC ACTIVITIES: CPT

## 2020-12-28 PROCEDURE — 87186 SC STD MICRODIL/AGAR DIL: CPT

## 2020-12-28 PROCEDURE — 72148 MRI LUMBAR SPINE W/O DYE: CPT

## 2021-05-04 ENCOUNTER — EMERGENCY (EMERGENCY)
Facility: HOSPITAL | Age: 86
LOS: 1 days | Discharge: HOME | End: 2021-05-04
Attending: EMERGENCY MEDICINE
Payer: MEDICARE

## 2021-05-04 VITALS
RESPIRATION RATE: 18 BRPM | SYSTOLIC BLOOD PRESSURE: 119 MMHG | DIASTOLIC BLOOD PRESSURE: 80 MMHG | TEMPERATURE: 98 F | HEART RATE: 85 BPM | OXYGEN SATURATION: 99 % | WEIGHT: 151.9 LBS | HEIGHT: 58 IN

## 2021-05-04 DIAGNOSIS — Z96.651 PRESENCE OF RIGHT ARTIFICIAL KNEE JOINT: Chronic | ICD-10-CM

## 2021-05-04 PROBLEM — Z87.440 PERSONAL HISTORY OF URINARY (TRACT) INFECTIONS: Chronic | Status: ACTIVE | Noted: 2020-12-20

## 2021-05-04 PROCEDURE — 72125 CT NECK SPINE W/O DYE: CPT | Mod: 26,ME

## 2021-05-04 PROCEDURE — 99284 EMERGENCY DEPT VISIT MOD MDM: CPT

## 2021-05-04 PROCEDURE — 70450 CT HEAD/BRAIN W/O DYE: CPT

## 2021-05-04 PROCEDURE — 99284 EMERGENCY DEPT VISIT MOD MDM: CPT | Mod: 25

## 2021-05-04 PROCEDURE — 72125 CT NECK SPINE W/O DYE: CPT

## 2021-05-04 PROCEDURE — G1004: CPT

## 2021-05-04 PROCEDURE — 70450 CT HEAD/BRAIN W/O DYE: CPT | Mod: 26,ME

## 2021-05-04 NOTE — ED STATDOCS - PMH
High cholesterol    History of recurrent UTIs    HTN (hypertension)    Hypothyroidism    Osteoporosis    Pelvic fracture

## 2021-05-04 NOTE — ED STATDOCS - OBJECTIVE STATEMENT
89 y/o female with PMHx of HTN, HLD s/p slip and fall in shower. PT hit head and had headache. Pt on aspirin. Pt denies LOC. Pt endorses neck pain. Pt denies back pain.  PMD. Dr. Roa 87 y/o female with PMHx of HTN, HLD s/p slip in bathtub falling backwards and striking head this morning.  No LOC.   Has headache.  Denies neck pain.  Denies AC use: on aspirin. Pt denies LOC. Pt endorses neck pain. Pt denies back pain.  Denies injury to shoulders, arms, chest, abd, hip, knees.  PMD. Dr. Roa

## 2021-05-04 NOTE — ED STATDOCS - PATIENT PORTAL LINK FT
You can access the FollowMyHealth Patient Portal offered by [facility] by registering at the following website: [email or web address]. By joining [employer]’s FollowMyHealth portal, you will also be able to view your health information using other applications (apps) compatible with our system.

## 2021-05-04 NOTE — ED ADULT TRIAGE NOTE - CHIEF COMPLAINT QUOTE
pt reports she was in the shower, slipped and fell, denies LOC, c/o headache. Denies blood thinners, reports she only takes a baby aspirin.

## 2021-05-04 NOTE — ED STATDOCS - MUSCULOSKELETAL, MLM
no midline cervical thoracic, or lumbar spine TTP. + TTP occipital region without soft tissue swelling. Full ROM bilateral upper extremities. no midline cervical thoracic, or lumbar spine TTP. Full ROM bilateral upper extremities.

## 2021-05-20 PROBLEM — I10 ESSENTIAL (PRIMARY) HYPERTENSION: Chronic | Status: ACTIVE | Noted: 2021-05-04

## 2021-06-04 ENCOUNTER — APPOINTMENT (OUTPATIENT)
Dept: DERMATOLOGY | Facility: CLINIC | Age: 86
End: 2021-06-04
Payer: MEDICARE

## 2021-06-04 PROCEDURE — 99213 OFFICE O/P EST LOW 20 MIN: CPT

## 2021-07-16 ENCOUNTER — APPOINTMENT (OUTPATIENT)
Dept: DERMATOLOGY | Facility: CLINIC | Age: 86
End: 2021-07-16
Payer: MEDICARE

## 2021-07-16 PROCEDURE — 99214 OFFICE O/P EST MOD 30 MIN: CPT

## 2021-08-11 NOTE — ED ADULT TRIAGE NOTE - BP NONINVASIVE SYSTOLIC (MM HG)
Patient is asking if there is a different kind of drop that she can use. The Systane is not helping her dry eyes. She is using it 3 times a day along with an allergy pill. Patient is not sure what kind. Using Nasal spray as well. States that her right eye seems worse than the left. Both eyes are burning and itching. How would you like to proceed? 157

## 2021-09-18 ENCOUNTER — TRANSCRIPTION ENCOUNTER (OUTPATIENT)
Age: 86
End: 2021-09-18

## 2021-10-11 NOTE — ED ADULT TRIAGE NOTE - HEIGHT IN CM
[FreeTextEntry1] : Plan as above.\par \par The patient was advised to call for any problems or questions.\par \par Return visit for annual exam in 1 year, sooner if needed.
147.32

## 2021-10-19 NOTE — ED ADULT NURSE NOTE - DRUG PRE-SCREENING (DAST -1)
[FreeTextEntry1] : Mr. 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 Fontenot\par \par #SCC of the L supraglottic larynx,s/p CT/RT in 2016, recurred in the right soft palate, operated in 1/2018 and completed RT in 4/ 2018\par -CHEY \par -Continue to f/u with Dr. Snow and Dr. Pandya. \par -Continue daily neck exercises \par \par # Health maintenance \par -Colonoscopy as per PCP\par -Received covid vaccine\par \par RTC in 2 weeks  Statement Selected

## 2022-03-08 NOTE — ED ADULT NURSE NOTE - CAS TRG GEN SKIN CONDITION
Rx Refill Note  Requested Prescriptions     Pending Prescriptions Disp Refills   • nitroglycerin (NITROSTAT) 0.4 MG SL tablet 25 tablet 3     Sig: Place 1 tablet under the tongue Every 5 (Five) Minutes As Needed for Chest Pain. Take no more than 3 doses in 15 minutes.      Last office visit with prescribing clinician: 12/23/2021      Next office visit with prescribing clinician: 12/22/2022            Liliana Roth, [facility]  03/08/22, 11:22 EST   Warm

## 2022-07-14 ENCOUNTER — EMERGENCY (EMERGENCY)
Facility: HOSPITAL | Age: 87
LOS: 1 days | Discharge: HOME | End: 2022-07-14
Attending: EMERGENCY MEDICINE
Payer: MEDICARE

## 2022-07-14 VITALS
TEMPERATURE: 98 F | SYSTOLIC BLOOD PRESSURE: 153 MMHG | RESPIRATION RATE: 18 BRPM | WEIGHT: 151.9 LBS | HEART RATE: 72 BPM | HEIGHT: 58 IN | OXYGEN SATURATION: 100 % | DIASTOLIC BLOOD PRESSURE: 89 MMHG

## 2022-07-14 DIAGNOSIS — Z96.651 PRESENCE OF RIGHT ARTIFICIAL KNEE JOINT: Chronic | ICD-10-CM

## 2022-07-14 LAB
ALBUMIN SERPL ELPH-MCNC: 4.1 G/DL — SIGNIFICANT CHANGE UP (ref 3.3–5.2)
ALP SERPL-CCNC: 68 U/L — SIGNIFICANT CHANGE UP (ref 40–120)
ALT FLD-CCNC: 14 U/L — SIGNIFICANT CHANGE UP
ANION GAP SERPL CALC-SCNC: 15 MMOL/L — SIGNIFICANT CHANGE UP (ref 5–17)
APPEARANCE UR: ABNORMAL
AST SERPL-CCNC: 22 U/L — SIGNIFICANT CHANGE UP
BACTERIA # UR AUTO: ABNORMAL
BASOPHILS # BLD AUTO: 0.06 K/UL — SIGNIFICANT CHANGE UP (ref 0–0.2)
BASOPHILS NFR BLD AUTO: 0.9 % — SIGNIFICANT CHANGE UP (ref 0–2)
BILIRUB SERPL-MCNC: 0.5 MG/DL — SIGNIFICANT CHANGE UP (ref 0.4–2)
BILIRUB UR-MCNC: NEGATIVE — SIGNIFICANT CHANGE UP
BUN SERPL-MCNC: 11.1 MG/DL — SIGNIFICANT CHANGE UP (ref 8–20)
CALCIUM SERPL-MCNC: 9.4 MG/DL — SIGNIFICANT CHANGE UP (ref 8.6–10.2)
CHLORIDE SERPL-SCNC: 102 MMOL/L — SIGNIFICANT CHANGE UP (ref 98–107)
CO2 SERPL-SCNC: 21 MMOL/L — LOW (ref 22–29)
COLOR SPEC: YELLOW — SIGNIFICANT CHANGE UP
CREAT SERPL-MCNC: 0.45 MG/DL — LOW (ref 0.5–1.3)
DIFF PNL FLD: ABNORMAL
EGFR: 92 ML/MIN/1.73M2 — SIGNIFICANT CHANGE UP
EOSINOPHIL # BLD AUTO: 0.13 K/UL — SIGNIFICANT CHANGE UP (ref 0–0.5)
EOSINOPHIL NFR BLD AUTO: 1.9 % — SIGNIFICANT CHANGE UP (ref 0–6)
EPI CELLS # UR: ABNORMAL
GLUCOSE SERPL-MCNC: 95 MG/DL — SIGNIFICANT CHANGE UP (ref 70–99)
GLUCOSE UR QL: NEGATIVE — SIGNIFICANT CHANGE UP
HCT VFR BLD CALC: 45.3 % — HIGH (ref 34.5–45)
HGB BLD-MCNC: 14.9 G/DL — SIGNIFICANT CHANGE UP (ref 11.5–15.5)
IMM GRANULOCYTES NFR BLD AUTO: 0.4 % — SIGNIFICANT CHANGE UP (ref 0–1.5)
KETONES UR-MCNC: NEGATIVE — SIGNIFICANT CHANGE UP
LACTATE BLDV-MCNC: 1 MMOL/L — SIGNIFICANT CHANGE UP (ref 0.5–2)
LEUKOCYTE ESTERASE UR-ACNC: ABNORMAL
LIDOCAIN IGE QN: 16 U/L — LOW (ref 22–51)
LYMPHOCYTES # BLD AUTO: 1.4 K/UL — SIGNIFICANT CHANGE UP (ref 1–3.3)
LYMPHOCYTES # BLD AUTO: 20.1 % — SIGNIFICANT CHANGE UP (ref 13–44)
MCHC RBC-ENTMCNC: 30.4 PG — SIGNIFICANT CHANGE UP (ref 27–34)
MCHC RBC-ENTMCNC: 32.9 GM/DL — SIGNIFICANT CHANGE UP (ref 32–36)
MCV RBC AUTO: 92.4 FL — SIGNIFICANT CHANGE UP (ref 80–100)
MONOCYTES # BLD AUTO: 0.65 K/UL — SIGNIFICANT CHANGE UP (ref 0–0.9)
MONOCYTES NFR BLD AUTO: 9.3 % — SIGNIFICANT CHANGE UP (ref 2–14)
NEUTROPHILS # BLD AUTO: 4.69 K/UL — SIGNIFICANT CHANGE UP (ref 1.8–7.4)
NEUTROPHILS NFR BLD AUTO: 67.4 % — SIGNIFICANT CHANGE UP (ref 43–77)
NITRITE UR-MCNC: POSITIVE
PH UR: 7 — SIGNIFICANT CHANGE UP (ref 5–8)
PLATELET # BLD AUTO: 248 K/UL — SIGNIFICANT CHANGE UP (ref 150–400)
POTASSIUM SERPL-MCNC: 4.4 MMOL/L — SIGNIFICANT CHANGE UP (ref 3.5–5.3)
POTASSIUM SERPL-SCNC: 4.4 MMOL/L — SIGNIFICANT CHANGE UP (ref 3.5–5.3)
PROT SERPL-MCNC: 7.1 G/DL — SIGNIFICANT CHANGE UP (ref 6.6–8.7)
PROT UR-MCNC: 15
RBC # BLD: 4.9 M/UL — SIGNIFICANT CHANGE UP (ref 3.8–5.2)
RBC # FLD: 12.8 % — SIGNIFICANT CHANGE UP (ref 10.3–14.5)
RBC CASTS # UR COMP ASSIST: SIGNIFICANT CHANGE UP /HPF (ref 0–4)
SODIUM SERPL-SCNC: 138 MMOL/L — SIGNIFICANT CHANGE UP (ref 135–145)
SP GR SPEC: 1.01 — SIGNIFICANT CHANGE UP (ref 1.01–1.02)
UROBILINOGEN FLD QL: NEGATIVE — SIGNIFICANT CHANGE UP
WBC # BLD: 6.96 K/UL — SIGNIFICANT CHANGE UP (ref 3.8–10.5)
WBC # FLD AUTO: 6.96 K/UL — SIGNIFICANT CHANGE UP (ref 3.8–10.5)
WBC UR QL: ABNORMAL /HPF (ref 0–5)

## 2022-07-14 PROCEDURE — 87186 SC STD MICRODIL/AGAR DIL: CPT

## 2022-07-14 PROCEDURE — 99284 EMERGENCY DEPT VISIT MOD MDM: CPT | Mod: FS

## 2022-07-14 PROCEDURE — 74177 CT ABD & PELVIS W/CONTRAST: CPT | Mod: MA

## 2022-07-14 PROCEDURE — 96374 THER/PROPH/DIAG INJ IV PUSH: CPT | Mod: XU

## 2022-07-14 PROCEDURE — 87086 URINE CULTURE/COLONY COUNT: CPT

## 2022-07-14 PROCEDURE — 83605 ASSAY OF LACTIC ACID: CPT

## 2022-07-14 PROCEDURE — 80053 COMPREHEN METABOLIC PANEL: CPT

## 2022-07-14 PROCEDURE — 83690 ASSAY OF LIPASE: CPT

## 2022-07-14 PROCEDURE — 81001 URINALYSIS AUTO W/SCOPE: CPT

## 2022-07-14 PROCEDURE — 99284 EMERGENCY DEPT VISIT MOD MDM: CPT | Mod: 25

## 2022-07-14 PROCEDURE — 36415 COLL VENOUS BLD VENIPUNCTURE: CPT

## 2022-07-14 PROCEDURE — 85025 COMPLETE CBC W/AUTO DIFF WBC: CPT

## 2022-07-14 PROCEDURE — 74177 CT ABD & PELVIS W/CONTRAST: CPT | Mod: 26,MA

## 2022-07-14 RX ORDER — SODIUM CHLORIDE 9 MG/ML
1000 INJECTION INTRAMUSCULAR; INTRAVENOUS; SUBCUTANEOUS ONCE
Refills: 0 | Status: COMPLETED | OUTPATIENT
Start: 2022-07-14 | End: 2022-07-14

## 2022-07-14 RX ORDER — RADIOPAQUE PVC MARKERS/BARIUM 24MARKERS
900 CAPSULE ORAL ONCE
Refills: 0 | Status: COMPLETED | OUTPATIENT
Start: 2022-07-14 | End: 2022-07-14

## 2022-07-14 RX ORDER — ONDANSETRON 8 MG/1
4 TABLET, FILM COATED ORAL ONCE
Refills: 0 | Status: COMPLETED | OUTPATIENT
Start: 2022-07-14 | End: 2022-07-14

## 2022-07-14 RX ADMIN — Medication 900 MILLILITER(S): at 18:54

## 2022-07-14 RX ADMIN — Medication 1 TABLET(S): at 23:38

## 2022-07-14 RX ADMIN — ONDANSETRON 4 MILLIGRAM(S): 8 TABLET, FILM COATED ORAL at 18:52

## 2022-07-14 RX ADMIN — SODIUM CHLORIDE 1000 MILLILITER(S): 9 INJECTION INTRAMUSCULAR; INTRAVENOUS; SUBCUTANEOUS at 18:52

## 2022-07-14 NOTE — ED ADULT NURSE REASSESSMENT NOTE - NS ED NURSE REASSESS COMMENT FT1
Assumed care at 1930, received report from Mary. Patient a&ox4, denies dizziness, SOB, or chest pain but complains of fatigue and mild abdominal cramping. Patient awaiting abdominal CT, no distress noted.

## 2022-07-14 NOTE — ED PROVIDER NOTE - CARE PROVIDER_API CALL
Nicola Machado)  Surgery  General  [address] [place] [address]  Phone: [phone]  Fax: [phone]  Follow Up Time:

## 2022-07-14 NOTE — ED PROVIDER NOTE - ATTENDING APP SHARED VISIT CONTRIBUTION OF CARE
I, Dionna Jefferson, performed the initial face to face bedside interview with this patient regarding history of present illness, review of symptoms and relevant past medical, social and family history.  I completed an independent physical examination.  I was the initial provider who evaluated this patient. I have signed out the follow up of any pending tests (i.e. labs, radiological studies) to the ACP.  I have communicated the patient’s plan of care and disposition with the ACP.  The history, relevant review of systems, past medical and surgical history, medical decision making, and physical examination was documented by the scribe in my presence and I attest to the accuracy of the documentation.

## 2022-07-14 NOTE — ED PROVIDER NOTE - CARE PLAN
Principal Discharge DX:	Diverticulitis  Secondary Diagnosis:	UTI (urinary tract infection)  Secondary Diagnosis:	Epiploic appendagitis   1

## 2022-07-14 NOTE — ED PROVIDER NOTE - NSFOLLOWUPINSTRUCTIONS_ED_ALL_ED_FT
please take antibiotic with meal   call and follow up with primary are within 1-2 days and bring the result   for appendagitis take Motrin over the counter as need it for the pain   call and follow up with colorectal a well      Urinary Tract Infection    A urinary tract infection (UTI) is an infection of any part of the urinary tract, which includes the kidneys, ureters, bladder, and urethra. Risk factors include ignoring your need to urinate, wiping back to front if female, being an uncircumcised male, and having diabetes or a weak immune system. Symptoms include frequent urination, pain or burning with urination, foul smelling urine, cloudy urine, pain in the lower abdomen, blood in the urine, and fever. If you were prescribed an antibiotic medicine, take it as told by your health care provider. Do not stop taking the antibiotic even if you start to feel better.    SEEK IMMEDIATE MEDICAL CARE IF YOU HAVE ANY OF THE FOLLOWING SYMPTOMS: severe back or abdominal pain, fever, inability to keep fluids or medicine down, dizziness/lightheadedness, or a change in mental status.    Diverticulitis       Diverticulitis is when small pouches in your colon (large intestine) get infected or swollen. This causes pain in the belly (abdomen) and watery poop (diarrhea).    These pouches are called diverticula. The pouches form in people who have a condition called diverticulosis.      What are the causes?    This condition may be caused by poop (stool) that gets trapped in the pouches in your colon. The poop lets germs (bacteria) grow in the pouches. This causes the infection.      What increases the risk?    You are more likely to get this condition if you have small pouches in your colon. The risk is higher if:  •You are overweight or very overweight (obese).      •You do not exercise enough.      •You drink alcohol.      •You smoke or use products with tobacco in them.      •You eat a diet that has a lot of red meat such as beef, pork, or lamb.      •You eat a diet that does not have enough fiber in it.      •You are older than 40 years of age.        What are the signs or symptoms?    •Pain in the belly. Pain is often on the left side, but it may be in other areas.      •Fever and feeling cold.      •Feeling like you may vomit.      •Vomiting.      •Having cramps.      •Feeling full.      •Changes to how often you poop.      •Blood in your poop.        How is this treated?    Most cases are treated at home by:  •Taking over-the-counter pain medicines.      •Following a clear liquid diet.      •Taking antibiotic medicines.      •Resting.      Very bad cases may need to be treated at a hospital. This may include:  •Not eating or drinking.      •Taking prescription pain medicine.      •Getting antibiotic medicines through an IV tube.      •Getting fluid and food through an IV tube.      •Having surgery.      When you are feeling better, your doctor may tell you to have a test to check your colon (colonoscopy).      Follow these instructions at home:    Medicines   •Take over-the-counter and prescription medicines only as told by your doctor. These include:  •Antibiotics.      •Pain medicines.      •Fiber pills.      •Probiotics.      •Stool softeners.        •If you were prescribed an antibiotic medicine, take it as told by your doctor. Do not stop taking the antibiotic even if you start to feel better.      •Ask your doctor if the medicine prescribed to you requires you to avoid driving or using machinery.        Eating and drinking      •Follow a diet as told by your doctor.    •When you feel better, your doctor may tell you to change your diet. You may need to eat a lot of fiber. Fiber makes it easier to poop (have a bowel movement). Foods with fiber include:  •Berries.      •Beans.      •Lentils.      •Green vegetables.        •Avoid eating red meat.      General instructions     • Do not use any products that contain nicotine or tobacco, such as cigarettes, e-cigarettes, and chewing tobacco. If you need help quitting, ask your doctor.      •Exercise 3 or more times a week. Try to get 30 minutes each time. Exercise enough to sweat and make your heart beat faster.      •Keep all follow-up visits as told by your doctor. This is important.        Contact a doctor if:    •Your pain does not get better.      •You are not pooping like normal.        Get help right away if:    •Your pain gets worse.      •Your symptoms do not get better.      •Your symptoms get worse very fast.      •You have a fever.      •You vomit more than one time.    •You have poop that is:  •Bloody.      •Black.      •Tarry.          Summary    •This condition happens when small pouches in your colon get infected or swollen.      •Take medicines only as told by your doctor.      •Follow a diet as told by your doctor.      •Keep all follow-up visits as told by your doctor. This is important.      This information is not intended to replace advice given to you by your health care provider. Make sure you discuss any questions you have with your health care provider.

## 2022-07-14 NOTE — ED PROVIDER NOTE - NSICDXPASTMEDICALHX_GEN_ALL_CORE_FT
PAST MEDICAL HISTORY:  High cholesterol     History of recurrent UTIs     HTN (hypertension)     Hypothyroidism     Osteoporosis     Pelvic fracture

## 2022-07-14 NOTE — ED ADULT NURSE NOTE - OBJECTIVE STATEMENT
89y female AOx4 c/o diarrhea, nausea x few weeks intermittently. respirations even and unlabored. denies SOB, dizziness, weakness, or chest discomfort. abd soft and nondistended. skin WNL for race.

## 2022-07-14 NOTE — ED PROVIDER NOTE - OBJECTIVE STATEMENT
88 y/o female with PMHx of HLD, History of recurrent UTIs, HTN, Hypothyroidism, Osteoporosis, and Pelvic fractures s/p cholecystectomy in 1964, and appendectomy at age 16 on Duloxetine, Amlodipine, Simvastatin, Synthroid, and Oxybutynin presents to ED c/o diarrhea. Patient reports diarrhea, and abdominal pain x 2 weeks    Denies recent travel, eating take out food recently, blood in diarrhea, recent abx use  Dr. Roa: PMD

## 2022-07-14 NOTE — ED PROVIDER NOTE - PATIENT PORTAL LINK FT
You can access the FollowMyHealth Patient Portal offered by [facility] by registering at the following website: [email or web address]. By joining [employer]’s FollowMyHealth portal, you will also be able to view your health information using other applications (apps) compatible with our system.

## 2022-07-19 ENCOUNTER — EMERGENCY (EMERGENCY)
Facility: HOSPITAL | Age: 87
LOS: 1 days | Discharge: HOME | End: 2022-07-19
Attending: EMERGENCY MEDICINE
Payer: MEDICARE

## 2022-07-19 VITALS
DIASTOLIC BLOOD PRESSURE: 76 MMHG | OXYGEN SATURATION: 98 % | RESPIRATION RATE: 20 BRPM | HEART RATE: 67 BPM | SYSTOLIC BLOOD PRESSURE: 134 MMHG | TEMPERATURE: 98 F

## 2022-07-19 VITALS
DIASTOLIC BLOOD PRESSURE: 79 MMHG | HEART RATE: 90 BPM | WEIGHT: 132.94 LBS | HEIGHT: 58 IN | SYSTOLIC BLOOD PRESSURE: 141 MMHG | TEMPERATURE: 98 F | OXYGEN SATURATION: 94 % | RESPIRATION RATE: 20 BRPM

## 2022-07-19 DIAGNOSIS — Z96.651 PRESENCE OF RIGHT ARTIFICIAL KNEE JOINT: Chronic | ICD-10-CM

## 2022-07-19 LAB
ALBUMIN SERPL ELPH-MCNC: 4.2 G/DL — SIGNIFICANT CHANGE UP (ref 3.3–5.2)
ALP SERPL-CCNC: 60 U/L — SIGNIFICANT CHANGE UP (ref 40–120)
ALT FLD-CCNC: 15 U/L — SIGNIFICANT CHANGE UP
ANION GAP SERPL CALC-SCNC: 11 MMOL/L — SIGNIFICANT CHANGE UP (ref 5–17)
AST SERPL-CCNC: 22 U/L — SIGNIFICANT CHANGE UP
BASOPHILS # BLD AUTO: 0.06 K/UL — SIGNIFICANT CHANGE UP (ref 0–0.2)
BASOPHILS NFR BLD AUTO: 0.7 % — SIGNIFICANT CHANGE UP (ref 0–2)
BILIRUB SERPL-MCNC: 0.4 MG/DL — SIGNIFICANT CHANGE UP (ref 0.4–2)
BUN SERPL-MCNC: 10.7 MG/DL — SIGNIFICANT CHANGE UP (ref 8–20)
CALCIUM SERPL-MCNC: 9 MG/DL — SIGNIFICANT CHANGE UP (ref 8.6–10.2)
CHLORIDE SERPL-SCNC: 104 MMOL/L — SIGNIFICANT CHANGE UP (ref 98–107)
CO2 SERPL-SCNC: 23 MMOL/L — SIGNIFICANT CHANGE UP (ref 22–29)
CREAT SERPL-MCNC: 0.45 MG/DL — LOW (ref 0.5–1.3)
EGFR: 92 ML/MIN/1.73M2 — SIGNIFICANT CHANGE UP
EOSINOPHIL # BLD AUTO: 0.12 K/UL — SIGNIFICANT CHANGE UP (ref 0–0.5)
EOSINOPHIL NFR BLD AUTO: 1.5 % — SIGNIFICANT CHANGE UP (ref 0–6)
GLUCOSE SERPL-MCNC: 88 MG/DL — SIGNIFICANT CHANGE UP (ref 70–99)
HCT VFR BLD CALC: 44.4 % — SIGNIFICANT CHANGE UP (ref 34.5–45)
HGB BLD-MCNC: 14.7 G/DL — SIGNIFICANT CHANGE UP (ref 11.5–15.5)
IMM GRANULOCYTES NFR BLD AUTO: 0.4 % — SIGNIFICANT CHANGE UP (ref 0–1.5)
LYMPHOCYTES # BLD AUTO: 1.27 K/UL — SIGNIFICANT CHANGE UP (ref 1–3.3)
LYMPHOCYTES # BLD AUTO: 15.7 % — SIGNIFICANT CHANGE UP (ref 13–44)
MAGNESIUM SERPL-MCNC: 1.7 MG/DL — SIGNIFICANT CHANGE UP (ref 1.6–2.6)
MCHC RBC-ENTMCNC: 30.8 PG — SIGNIFICANT CHANGE UP (ref 27–34)
MCHC RBC-ENTMCNC: 33.1 GM/DL — SIGNIFICANT CHANGE UP (ref 32–36)
MCV RBC AUTO: 92.9 FL — SIGNIFICANT CHANGE UP (ref 80–100)
MONOCYTES # BLD AUTO: 0.75 K/UL — SIGNIFICANT CHANGE UP (ref 0–0.9)
MONOCYTES NFR BLD AUTO: 9.2 % — SIGNIFICANT CHANGE UP (ref 2–14)
NEUTROPHILS # BLD AUTO: 5.88 K/UL — SIGNIFICANT CHANGE UP (ref 1.8–7.4)
NEUTROPHILS NFR BLD AUTO: 72.5 % — SIGNIFICANT CHANGE UP (ref 43–77)
PLATELET # BLD AUTO: 223 K/UL — SIGNIFICANT CHANGE UP (ref 150–400)
POTASSIUM SERPL-MCNC: 4.7 MMOL/L — SIGNIFICANT CHANGE UP (ref 3.5–5.3)
POTASSIUM SERPL-SCNC: 4.7 MMOL/L — SIGNIFICANT CHANGE UP (ref 3.5–5.3)
PROT SERPL-MCNC: 6.8 G/DL — SIGNIFICANT CHANGE UP (ref 6.6–8.7)
RBC # BLD: 4.78 M/UL — SIGNIFICANT CHANGE UP (ref 3.8–5.2)
RBC # FLD: 13.1 % — SIGNIFICANT CHANGE UP (ref 10.3–14.5)
SODIUM SERPL-SCNC: 138 MMOL/L — SIGNIFICANT CHANGE UP (ref 135–145)
WBC # BLD: 8.11 K/UL — SIGNIFICANT CHANGE UP (ref 3.8–10.5)
WBC # FLD AUTO: 8.11 K/UL — SIGNIFICANT CHANGE UP (ref 3.8–10.5)

## 2022-07-19 PROCEDURE — 83735 ASSAY OF MAGNESIUM: CPT

## 2022-07-19 PROCEDURE — 99284 EMERGENCY DEPT VISIT MOD MDM: CPT | Mod: 25

## 2022-07-19 PROCEDURE — 74177 CT ABD & PELVIS W/CONTRAST: CPT | Mod: MG

## 2022-07-19 PROCEDURE — 74177 CT ABD & PELVIS W/CONTRAST: CPT | Mod: 26,MG

## 2022-07-19 PROCEDURE — G1004: CPT

## 2022-07-19 PROCEDURE — 80053 COMPREHEN METABOLIC PANEL: CPT

## 2022-07-19 PROCEDURE — 85025 COMPLETE CBC W/AUTO DIFF WBC: CPT

## 2022-07-19 PROCEDURE — 99284 EMERGENCY DEPT VISIT MOD MDM: CPT | Mod: GC

## 2022-07-19 PROCEDURE — 36415 COLL VENOUS BLD VENIPUNCTURE: CPT

## 2022-07-19 PROCEDURE — 96360 HYDRATION IV INFUSION INIT: CPT | Mod: XU

## 2022-07-19 RX ORDER — LACTOBACILLUS ACIDOPHILUS 100MM CELL
1 CAPSULE ORAL ONCE
Refills: 0 | Status: COMPLETED | OUTPATIENT
Start: 2022-07-19 | End: 2022-07-19

## 2022-07-19 RX ORDER — SODIUM CHLORIDE 9 MG/ML
500 INJECTION INTRAMUSCULAR; INTRAVENOUS; SUBCUTANEOUS ONCE
Refills: 0 | Status: COMPLETED | OUTPATIENT
Start: 2022-07-19 | End: 2022-07-19

## 2022-07-19 RX ADMIN — Medication 1 TABLET(S): at 15:42

## 2022-07-19 RX ADMIN — SODIUM CHLORIDE 500 MILLILITER(S): 9 INJECTION INTRAMUSCULAR; INTRAVENOUS; SUBCUTANEOUS at 14:35

## 2022-07-19 NOTE — ED STATDOCS - NSTIMEPROVIDERCAREINITIATE_GEN_ER
February 14, 2019      Chani ARTURO Elaine Eliezer  [address]   [facility] [address]    Dear Ms.McKinney Martins,      I am happy to inform you that your recent cervical cancer screening test (PAP smear) was normal.      Preventative screenings such as this help to ensure your health for years to come. You should repeat a pap smear in 3 years, unless otherwise directed.      You will still need to return to the clinic every year for your annual exam and other preventive tests.     If you have additional questions regarding this result, please call our registered nurse, Dalia at [phone].      Sincerely,      Kellie Echeverria, VERONICA CNP/rlm       19-Jul-2022 13:26

## 2022-07-19 NOTE — ED ADULT TRIAGE NOTE - CHIEF COMPLAINT QUOTE
Patient arrived to ED today with c/o diarrhea, abdominal pains, dehydration.  Patient was here 5 days ago in ED seen and treated.

## 2022-07-19 NOTE — ED STATDOCS - PROGRESS NOTE DETAILS
Pt with known diverticulitis, symtpms not improving despite outpt therapy.  Also now complaining of retrosternal burning and radiation to shoulders.

## 2022-07-19 NOTE — ED PROVIDER NOTE - PATIENT PORTAL LINK FT
You can access the FollowMyHealth Patient Portal offered by [facility] by registering at the following website: [email or web address]. By joining "[employer]’s FollowMyHealth portal, you will also be able to view your health information using other applications (apps) compatible with our system.

## 2022-07-19 NOTE — ED PROVIDER NOTE - PHYSICAL EXAMINATION
General: well appearing, NAD  Head: NC/AT  Cardiac: RRR, no m/r/g  Respiratory: CTABL, equal chest wall expansions, no conversational dyspnea  Abdomen: soft, ND, mildly ttp  Neuro: AAOx3, coordinated movement of all 4 extremities  Psych: calm, cooperative, normal affect  Skin: warm and dry

## 2022-07-19 NOTE — ED PROVIDER NOTE - CLINICAL SUMMARY MEDICAL DECISION MAKING FREE TEXT BOX
89yo F w/pmh HLD, History of recurrent UTIs, HTN, Hypothyroidism, Osteoporosis presents for diarrhea x3 weeks. Labs, CT pending

## 2022-07-19 NOTE — ED PROVIDER NOTE - NS ED ROS FT
Constitutional: no fever, no sweats, and no chills.  CV: no chest pain, no edema.  Resp: no cough, no dyspnea  GI: +abdominal pain, no nausea and no vomiting. +diarrhea  MSK: no msk pain, no weakness  Skin: no lesions, and no rashes.  Neuro: no LOC, no headache, no sensory deficits, and no dizziness  ROS otherwise negative except as noted in HPI.

## 2022-07-19 NOTE — ED PROVIDER NOTE - NSFOLLOWUPINSTRUCTIONS_ED_ALL_ED_FT
Follow up with your primary care provider in the next 3 days.  There was a possible mass at opening of the common bile duct. Have primary care provider follow up with an ultrasound.  Take the antibiotics as prescribed.  Stay well hydrated.  Take tylenol for the pain.    Diverticulitis       Diverticulitis is infection or inflammation of small pouches (diverticula) in the colon that form due to a condition called diverticulosis. Diverticula can trap stool (feces) and bacteria, causing infection and inflammation.    Diverticulitis may cause severe stomach pain and diarrhea. It may lead to tissue damage in the colon that causes bleeding or blockage. The diverticula may also burst (rupture) and cause infected stool to enter other areas of the abdomen.      What are the causes?    This condition is caused by stool becoming trapped in the diverticula, which allows bacteria to grow in the diverticula. This leads to inflammation and infection.      What increases the risk?    You are more likely to develop this condition if you have diverticulosis. The risk increases if you:  •Are overweight or obese.      •Do not get enough exercise.      •Drink alcohol.      •Use tobacco products.      •Eat a diet that has a lot of red meat such as beef, pork, or lamb.      •Eat a diet that does not include enough fiber. High-fiber foods include fruits, vegetables, beans, nuts, and whole grains.      •Are over 40 years of age.        What are the signs or symptoms?    Symptoms of this condition may include:  •Pain and tenderness in the abdomen. The pain is normally located on the left side of the abdomen, but it may occur in other areas.      •Fever and chills.      •Nausea.      •Vomiting.      •Cramping.      •Bloating.      •Changes in bowel routines.      •Blood in your stool.        How is this diagnosed?    This condition is diagnosed based on:  •Your medical history.      •A physical exam.    •Tests to make sure there is nothing else causing your condition. These tests may include:  •Blood tests.      •Urine tests.      •CT scan of the abdomen.          How is this treated?    Most cases of this condition are mild and can be treated at home. Treatment may include:  •Taking over-the-counter pain medicines.      •Following a clear liquid diet.      •Taking antibiotic medicines by mouth.      •Resting.      More severe cases may need to be treated at a hospital. Treatment may include:  •Not eating or drinking.      •Taking prescription pain medicine.      •Receiving antibiotic medicines through an IV.      •Receiving fluids and nutrition through an IV.      •Surgery.      When your condition is under control, your health care provider may recommend that you have a colonoscopy. This is an exam to look at the entire large intestine. During the exam, a lubricated, bendable tube is inserted into the anus and then passed into the rectum, colon, and other parts of the large intestine. A colonoscopy can show how severe your diverticula are and whether something else may be causing your symptoms.      Follow these instructions at home:    Medicines     •Take over-the-counter and prescription medicines only as told by your health care provider. These include fiber supplements, probiotics, and stool softeners.      •If you were prescribed an antibiotic medicine, take it as told by your health care provider. Do not stop taking the antibiotic even if you start to feel better.      •Ask your health care provider if the medicine prescribed to you requires you to avoid driving or using machinery.        Eating and drinking      •Follow a full liquid diet or another diet as directed by your health care provider.    •After your symptoms improve, your health care provider may tell you to change your diet. He or she may recommend that you eat a diet that contains at least 25 grams (25 g) of fiber daily. Fiber makes it easier to pass stool. Healthy sources of fiber include:  •Berries. One cup contains 4–8 grams of fiber.      •Beans or lentils. One-half cup contains 5–8 grams of fiber.      •Green vegetables. One cup contains 4 grams of fiber.        •Avoid eating red meat.      General instructions     • Do not use any products that contain nicotine or tobacco, such as cigarettes, e-cigarettes, and chewing tobacco. If you need help quitting, ask your health care provider.      •Exercise for at least 30 minutes, 3 times each week. You should exercise hard enough to raise your heart rate and break a sweat.      •Keep all follow-up visits as told by your health care provider. This is important. You may need to have a colonoscopy.        Contact a health care provider if:    •Your pain does not improve.      •Your bowel movements do not return to normal.        Get help right away if:    •Your pain gets worse.      •Your symptoms do not get better with treatment.      •Your symptoms suddenly get worse.      •You have a fever.      •You vomit more than one time.      •You have stools that are bloody, black, or tarry.        Summary    •Diverticulitis is infection or inflammation of small pouches (diverticula) in the colon that form due to a condition called diverticulosis. Diverticula can trap stool (feces) and bacteria, causing infection and inflammation.      •You are at higher risk for this condition if you have diverticulosis and you eat a diet that does not include enough fiber.      •Most cases of this condition are mild and can be treated at home. More severe cases may need to be treated at a hospital.      •When your condition is under control, your health care provider may recommend that you have an exam called a colonoscopy. This exam can show how severe your diverticula are and whether something else may be causing your symptoms.      •Keep all follow-up visits as told by your health care provider. This is important.      This information is not intended to replace advice given to you by your health care provider. Make sure you discuss any questions you have with your health care provider.

## 2022-07-19 NOTE — ED PROVIDER NOTE - ATTENDING CONTRIBUTION TO CARE
89yo F w/pmh HLD, History of recurrent UTIs, HTN, Hypothyroidism, Osteoporosis presents for diarrhea x3 weeks. Assoc/w cramping abdominal pain. Denies f/ch, cough, CP, n/v. Patient reports she visited 4d ago, diagnosed w/ UTI and possible diverticulitis, discharged with augmentin. Per daughter, patient downplays her symptoms, has 3+ loose BMs per day including fecal incontinence, sometimes makes multiple trips to bathroom within 15 minutes but counts it as "one" episode. No blood in stool. no vomiting. no fevers. no urinary complaints. no trauma. +abd cramping but no pain    on exam pt appears well hydrated with LLQ ttp. labs, ua, ucx, ct a/p ordered. no bm while in ED. pt signed out pending final results

## 2022-07-19 NOTE — ED PROVIDER NOTE - OBJECTIVE STATEMENT
88yo F w/pmh HLD, History of recurrent UTIs, HTN, Hypothyroidism, Osteoporosis presents for diarrhea x3 weeks. Assoc/w cramping abdominal pain. Denies f/ch, cough, CP, n/v. Patient reports she visited 4d ago, diagnosed w/ UTI, discharged with augmentin. Per daughter, patient downplays her symptoms 88yo F w/pmh HLD, History of recurrent UTIs, HTN, Hypothyroidism, Osteoporosis presents for diarrhea x3 weeks. Assoc/w cramping abdominal pain. Denies f/ch, cough, CP, n/v. Patient reports she visited 4d ago, diagnosed w/ UTI, discharged with augmentin. Per daughter, patient downplays her symptoms, has 3+ loose BMs per day including fecal incontinence, sometimes makes multiple trips to bathroom within 15 minutes but counts it as "one" episode. 89yo F w/pmh HLD, History of recurrent UTIs, HTN, Hypothyroidism, Osteoporosis presents for diarrhea x3 weeks. Assoc/w cramping abdominal pain. Denies f/ch, cough, CP, n/v. Patient reports she visited 4d ago, diagnosed w/ UTI and possible diverticulitis, discharged with augmentin. Per daughter, patient downplays her symptoms, has 3+ loose BMs per day including fecal incontinence, sometimes makes multiple trips to bathroom within 15 minutes but counts it as "one" episode.

## 2022-07-26 ENCOUNTER — APPOINTMENT (OUTPATIENT)
Dept: GASTROENTEROLOGY | Facility: CLINIC | Age: 87
End: 2022-07-26

## 2022-07-26 VITALS
RESPIRATION RATE: 16 BRPM | SYSTOLIC BLOOD PRESSURE: 120 MMHG | BODY MASS INDEX: 30.44 KG/M2 | WEIGHT: 145 LBS | HEART RATE: 80 BPM | DIASTOLIC BLOOD PRESSURE: 75 MMHG | HEIGHT: 58 IN | OXYGEN SATURATION: 97 %

## 2022-07-26 DIAGNOSIS — Z00.00 ENCOUNTER FOR GENERAL ADULT MEDICAL EXAMINATION W/OUT ABNORMAL FINDINGS: ICD-10-CM

## 2022-07-26 DIAGNOSIS — K80.50 CALCULUS OF BILE DUCT W/OUT CHOLANGITIS OR CHOLECYSTITIS W/OUT OBSTRUCTION: ICD-10-CM

## 2022-07-26 PROCEDURE — 99204 OFFICE O/P NEW MOD 45 MIN: CPT

## 2022-07-26 RX ORDER — NITROFURANTOIN (MONOHYDRATE/MACROCRYSTALS) 25; 75 MG/1; MG/1
100 CAPSULE ORAL
Qty: 14 | Refills: 0 | Status: DISCONTINUED | COMMUNITY
Start: 2022-02-25

## 2022-07-26 RX ORDER — DICLOFENAC SODIUM 10 MG/G
1 GEL TOPICAL TWICE DAILY
Qty: 100 | Refills: 5 | Status: DISCONTINUED | COMMUNITY
Start: 2020-01-15 | End: 2022-07-26

## 2022-07-26 NOTE — PHYSICAL EXAM
[General Appearance - Alert] : alert [General Appearance - In No Acute Distress] : in no acute distress [Sclera] : the sclera and conjunctiva were normal [PERRL With Normal Accommodation] : pupils were equal in size, round, and reactive to light [Extraocular Movements] : extraocular movements were intact [Outer Ear] : the ears and nose were normal in appearance [Oropharynx] : the oropharynx was normal [Neck Appearance] : the appearance of the neck was normal [Neck Cervical Mass (___cm)] : no neck mass was observed [Jugular Venous Distention Increased] : there was no jugular-venous distention [Thyroid Diffuse Enlargement] : the thyroid was not enlarged [Thyroid Nodule] : there were no palpable thyroid nodules [Auscultation Breath Sounds / Voice Sounds] : lungs were clear to auscultation bilaterally [Heart Rate And Rhythm] : heart rate was normal and rhythm regular [Heart Sounds] : normal S1 and S2 [Heart Sounds Gallop] : no gallops [Murmurs] : no murmurs [Heart Sounds Pericardial Friction Rub] : no pericardial rub [Bowel Sounds] : normal bowel sounds [Abdomen Soft] : soft [Abdomen Tenderness] : non-tender [] : no hepato-splenomegaly [Abdomen Mass (___ Cm)] : no abdominal mass palpated [Normal Sphincter Tone] : normal sphincter tone [No Rectal Mass] : no rectal mass [Occult Blood Positive] : stool was negative for occult blood [FreeTextEntry1] : Stool is firm, formed.. Brown. Occult blood negative.  No Palpable lesions.

## 2022-07-26 NOTE — REASON FOR VISIT
[Consultation] : a consultation visit [FreeTextEntry1] : 3 of diarrhea and colon polyps.  Recent diagnosis of diverticulitis and UTI.  Abnormal imaging.

## 2022-07-26 NOTE — HISTORY OF PRESENT ILLNESS
[FreeTextEntry1] : 89-year-old white female with history of neurofibromatosis hypothyroidism hyperlipidemia osteoporosis glaucoma obesity who had previously been evaluated for diarrhea 3 years ago; stool  studies were negative at that time and blood work was unrevealing.  Diarrhea resolved spontaneously.\par There is a remote history of colon polyps.  Past surgical history cholecystectomy 1964.\par More recently patient developed nausea and diarrhea and presented for ER evaluation on 7/14 at [facility].  Blood work was unrevealing including LFTs and a CT scan of the abdomen and pelvis showed a somewhat dilated 1.1 cm common bile duct and status postcholecystectomy.  CAT scan showed what appeared to be either epiploic appendagitis or acute diverticulitis in the sigmoid colon.  Augmentin was prescribed and taken.\par Patient returned to the ED in 7/19 with vague diffuse abdominal pain.  Again no fever and again blood work unrevealing including normal LFTs.  CT scan repeated and showed further dilatation of the common bile duct from 11 mm to 17 mm.  Pancreatic duct now was dilated at 5 mm.  There was a questionable 12 mm mass at the ampulla now identified.  No discrete CBD stone or pancreatitis could be identified.  Endoscopic ultrasound was recommended.\par Antibiotics were concluded shortly thereafter other medications remained unchanged.  Abdominal pain has virtually resolved.  No fever.  No jaundice.  No back pain.  No abdominal distention.  No overt bleeding.

## 2022-07-26 NOTE — CONSULT LETTER
[Dear  ___] : Dear  [unfilled], [Consult Letter:] : I had the pleasure of evaluating your patient, [unfilled]. [Please see my note below.] : Please see my note below. [Consult Closing:] : Thank you very much for allowing me to participate in the care of this patient.  If you have any questions, please do not hesitate to contact me. [Sincerely,] : Sincerely, [FreeTextEntry1] : Bouts of diarrhea possible diverticulitis and abdominal pain.  Normal LFTs throughout.  No dilatation of CBD.  Status postcholecystectomy.  Modest PD dilatation as well.  Possible hyperdensity at ampulla of Vater.  Unclear etiology.  MRCP first then possible EUS and FNA. [FreeTextEntry3] : Olu Elizabeth MD FACG\par Diplomate American Board of Internal Medicine and Gastroenterolgy\par [facility]\par

## 2022-07-26 NOTE — ASSESSMENT
[FreeTextEntry1] : History of neurofibromatosis.  History of diarrhea in the past.  Recent bout of nausea and diarrhea with CAT scan probably showing diverticulitis or epiploic appendagitis.  Nonetheless antibiotics administered.  Subsequent abdominal pain with repeat CT scan showing further dilatation of the common bile duct to 17 mm and now with dilatation of the pancreatic duct.  Surprisingly no pancreatitis or abnormal liver function test noted.  A hyperdensity at the ampulla of Vater is identified.  Clear if this represents stone mass or neurofibroma.\par Clinically stable.  Pain-free.  Diarrhea free.\par Plan MRCP.  The procedure, its risks and benefits and prep were reviewed with patient understands and is agreeable to proceed.  Call for results.  GI office follow-up here in 1 month.  Repeat blood work not requested yet.\par Possible future EUS.  Might need FNA for definitive diagnosis.

## 2022-07-31 ENCOUNTER — APPOINTMENT (OUTPATIENT)
Dept: MRI IMAGING | Facility: CLINIC | Age: 87
End: 2022-07-31

## 2022-07-31 ENCOUNTER — OUTPATIENT (OUTPATIENT)
Dept: OUTPATIENT SERVICES | Facility: HOSPITAL | Age: 87
LOS: 1 days | End: 2022-07-31
Payer: MEDICARE

## 2022-07-31 DIAGNOSIS — Z96.651 PRESENCE OF RIGHT ARTIFICIAL KNEE JOINT: Chronic | ICD-10-CM

## 2022-07-31 DIAGNOSIS — R93.3 ABNORMAL FINDINGS ON DIAGNOSTIC IMAGING OF OTHER PARTS OF DIGESTIVE TRACT: ICD-10-CM

## 2022-07-31 PROCEDURE — 74181 MRI ABDOMEN W/O CONTRAST: CPT | Mod: 26,MH

## 2022-07-31 PROCEDURE — 74181 MRI ABDOMEN W/O CONTRAST: CPT

## 2022-08-04 ENCOUNTER — NON-APPOINTMENT (OUTPATIENT)
Age: 87
End: 2022-08-04

## 2022-08-04 DIAGNOSIS — R93.3 ABNORMAL FINDINGS ON DIAGNOSTIC IMAGING OF OTHER PARTS OF DIGESTIVE TRACT: ICD-10-CM

## 2022-08-12 ENCOUNTER — OUTPATIENT (OUTPATIENT)
Dept: OUTPATIENT SERVICES | Facility: HOSPITAL | Age: 87
LOS: 1 days | End: 2022-08-12
Payer: MEDICARE

## 2022-08-12 VITALS
OXYGEN SATURATION: 95 % | RESPIRATION RATE: 18 BRPM | HEART RATE: 86 BPM | DIASTOLIC BLOOD PRESSURE: 80 MMHG | HEIGHT: 58 IN | WEIGHT: 145.51 LBS | TEMPERATURE: 97 F | SYSTOLIC BLOOD PRESSURE: 110 MMHG

## 2022-08-12 DIAGNOSIS — Z96.651 PRESENCE OF RIGHT ARTIFICIAL KNEE JOINT: Chronic | ICD-10-CM

## 2022-08-12 DIAGNOSIS — Z90.49 ACQUIRED ABSENCE OF OTHER SPECIFIED PARTS OF DIGESTIVE TRACT: Chronic | ICD-10-CM

## 2022-08-12 DIAGNOSIS — I10 ESSENTIAL (PRIMARY) HYPERTENSION: ICD-10-CM

## 2022-08-12 DIAGNOSIS — Z98.891 HISTORY OF UTERINE SCAR FROM PREVIOUS SURGERY: Chronic | ICD-10-CM

## 2022-08-12 DIAGNOSIS — R93.3 ABNORMAL FINDINGS ON DIAGNOSTIC IMAGING OF OTHER PARTS OF DIGESTIVE TRACT: ICD-10-CM

## 2022-08-12 DIAGNOSIS — Z98.49 CATARACT EXTRACTION STATUS, UNSPECIFIED EYE: Chronic | ICD-10-CM

## 2022-08-12 DIAGNOSIS — Z29.9 ENCOUNTER FOR PROPHYLACTIC MEASURES, UNSPECIFIED: ICD-10-CM

## 2022-08-12 DIAGNOSIS — Z01.818 ENCOUNTER FOR OTHER PREPROCEDURAL EXAMINATION: ICD-10-CM

## 2022-08-12 DIAGNOSIS — E03.9 HYPOTHYROIDISM, UNSPECIFIED: ICD-10-CM

## 2022-08-12 LAB
ANION GAP SERPL CALC-SCNC: 13 MMOL/L — SIGNIFICANT CHANGE UP (ref 5–17)
APTT BLD: 29.9 SEC — SIGNIFICANT CHANGE UP (ref 27.5–35.5)
BASOPHILS # BLD AUTO: 0.07 K/UL — SIGNIFICANT CHANGE UP (ref 0–0.2)
BASOPHILS NFR BLD AUTO: 1 % — SIGNIFICANT CHANGE UP (ref 0–2)
BUN SERPL-MCNC: 16.8 MG/DL — SIGNIFICANT CHANGE UP (ref 8–20)
CALCIUM SERPL-MCNC: 9.8 MG/DL — SIGNIFICANT CHANGE UP (ref 8.4–10.5)
CHLORIDE SERPL-SCNC: 104 MMOL/L — SIGNIFICANT CHANGE UP (ref 98–107)
CO2 SERPL-SCNC: 25 MMOL/L — SIGNIFICANT CHANGE UP (ref 22–29)
CREAT SERPL-MCNC: 0.64 MG/DL — SIGNIFICANT CHANGE UP (ref 0.5–1.3)
EGFR: 84 ML/MIN/1.73M2 — SIGNIFICANT CHANGE UP
EOSINOPHIL # BLD AUTO: 0.19 K/UL — SIGNIFICANT CHANGE UP (ref 0–0.5)
EOSINOPHIL NFR BLD AUTO: 2.7 % — SIGNIFICANT CHANGE UP (ref 0–6)
GLUCOSE SERPL-MCNC: 150 MG/DL — HIGH (ref 70–99)
HCT VFR BLD CALC: 44.8 % — SIGNIFICANT CHANGE UP (ref 34.5–45)
HGB BLD-MCNC: 14.7 G/DL — SIGNIFICANT CHANGE UP (ref 11.5–15.5)
IMM GRANULOCYTES NFR BLD AUTO: 0.3 % — SIGNIFICANT CHANGE UP (ref 0–1.5)
INR BLD: 1.15 RATIO — SIGNIFICANT CHANGE UP (ref 0.88–1.16)
LYMPHOCYTES # BLD AUTO: 1.44 K/UL — SIGNIFICANT CHANGE UP (ref 1–3.3)
LYMPHOCYTES # BLD AUTO: 20.5 % — SIGNIFICANT CHANGE UP (ref 13–44)
MCHC RBC-ENTMCNC: 30.7 PG — SIGNIFICANT CHANGE UP (ref 27–34)
MCHC RBC-ENTMCNC: 32.8 GM/DL — SIGNIFICANT CHANGE UP (ref 32–36)
MCV RBC AUTO: 93.5 FL — SIGNIFICANT CHANGE UP (ref 80–100)
MONOCYTES # BLD AUTO: 0.79 K/UL — SIGNIFICANT CHANGE UP (ref 0–0.9)
MONOCYTES NFR BLD AUTO: 11.2 % — SIGNIFICANT CHANGE UP (ref 2–14)
NEUTROPHILS # BLD AUTO: 4.53 K/UL — SIGNIFICANT CHANGE UP (ref 1.8–7.4)
NEUTROPHILS NFR BLD AUTO: 64.3 % — SIGNIFICANT CHANGE UP (ref 43–77)
PLATELET # BLD AUTO: 243 K/UL — SIGNIFICANT CHANGE UP (ref 150–400)
POTASSIUM SERPL-MCNC: 5 MMOL/L — SIGNIFICANT CHANGE UP (ref 3.5–5.3)
POTASSIUM SERPL-SCNC: 5 MMOL/L — SIGNIFICANT CHANGE UP (ref 3.5–5.3)
PROTHROM AB SERPL-ACNC: 13.4 SEC — SIGNIFICANT CHANGE UP (ref 10.5–13.4)
RBC # BLD: 4.79 M/UL — SIGNIFICANT CHANGE UP (ref 3.8–5.2)
RBC # FLD: 13 % — SIGNIFICANT CHANGE UP (ref 10.3–14.5)
SODIUM SERPL-SCNC: 142 MMOL/L — SIGNIFICANT CHANGE UP (ref 135–145)
WBC # BLD: 7.04 K/UL — SIGNIFICANT CHANGE UP (ref 3.8–10.5)
WBC # FLD AUTO: 7.04 K/UL — SIGNIFICANT CHANGE UP (ref 3.8–10.5)

## 2022-08-12 PROCEDURE — 85730 THROMBOPLASTIN TIME PARTIAL: CPT

## 2022-08-12 PROCEDURE — 85610 PROTHROMBIN TIME: CPT

## 2022-08-12 PROCEDURE — 93010 ELECTROCARDIOGRAM REPORT: CPT

## 2022-08-12 PROCEDURE — 36415 COLL VENOUS BLD VENIPUNCTURE: CPT

## 2022-08-12 PROCEDURE — 93005 ELECTROCARDIOGRAM TRACING: CPT

## 2022-08-12 PROCEDURE — 80048 BASIC METABOLIC PNL TOTAL CA: CPT

## 2022-08-12 PROCEDURE — U0003: CPT

## 2022-08-12 PROCEDURE — U0005: CPT

## 2022-08-12 PROCEDURE — 85025 COMPLETE CBC W/AUTO DIFF WBC: CPT

## 2022-08-12 PROCEDURE — G0463: CPT

## 2022-08-12 RX ORDER — SENNA PLUS 8.6 MG/1
2 TABLET ORAL
Qty: 0 | Refills: 0 | DISCHARGE

## 2022-08-12 RX ORDER — OXYBUTYNIN CHLORIDE 5 MG
1 TABLET ORAL
Qty: 0 | Refills: 0 | DISCHARGE

## 2022-08-12 RX ORDER — ALENDRONATE SODIUM 70 MG/1
0 TABLET ORAL
Qty: 0 | Refills: 0 | DISCHARGE

## 2022-08-12 NOTE — H&P PST ADULT - LAST STRESS TEST
Occupational Therapy   Treatment    Name: Forest Lopez  MRN: [id number]  Admitting Diagnosis:  Sepsis       Recommendations:     Discharge Recommendations: nursing facility, skilled  Discharge Equipment Recommendations:  bedside commode, shower chair, walker, rolling  Barriers to discharge:  Decreased caregiver support    Assessment:     Forest Lopez is a 72 y.o. male with a medical diagnosis of Sepsis.  He presents with no pain. Performance deficits affecting function are weakness, decreased safety awareness, impaired balance, impaired cognition, impaired self care skills, impaired functional mobilty, gait instability, decreased lower extremity function, decreased upper extremity function, decreased coordination, impaired endurance.  Pt agreeable to participating in therapy upon arrival to room.  After performing sit <> stand transfer with CGA/HHA from EOB pt noted to have had bowel movement at bed level.  Pt demonstrated decreased task initiation in cleaning himself requiring Total A while standing.  Pt displayed one instance of LOB while ambulating to sink with CGA/HHA requiring Min A to correct.  While standing at sink pt required SBA-CGA to perform grooming task.  After completing grooming task and ambulating back to bed, pt appeared SOB, slightly dazed, and reported feeling lightheaded.  Pt able to answer 3/3 orientation questions correctly.  RN called in to assess pt.      Overall, pt tolerated therapy well, but continues to demonstrate decreased safety awareness with ADLs and functional mobility.  In addition, delayed task initiation observed impacting pt's ability to perform daily routine.  Pt would continue to benefit from skilled OT services to address problems listed above and increase independence with ADLs.  SNF is recommended upon d/c from acute care to further address deficits and help pt improve overall functional independence.         Rehab Prognosis:  Good; patient would benefit from acute  skilled OT services to address these deficits and reach maximum level of function.       Plan:     Patient to be seen 3 x/week to address the above listed problems via self-care/home management, therapeutic exercises, therapeutic activities, cognitive retraining  · Plan of Care Expires: 03/03/22  · Plan of Care Reviewed with: patient    Subjective     Pain/Comfort:  · Pain Rating 1: 0/10  · Pain Rating Post-Intervention 1: 0/10    Objective:     Communicated with: RN prior to session.  Patient found HOB elevated with PICC line (AVASYS camera) upon OT entry to room.    General Precautions: Standard, anti-coagulation medicine, fall, diabetic   Orthopedic Precautions:N/A   Braces: N/A  Respiratory Status: Room air     Occupational Performance:     Bed Mobility:    · Supine <> sit: SBA   · Scooting: SBA seated towards EOB; SBA supine grasping bed rail and pushing with (B) LE  · Cues required for technique and task initiation  · Sit <> supine: SBA  · Increased time required for all activity    Functional Mobility/Transfers:  · Sit <> Stand: CGA/HHA x 2 trials from EOB  · Functional Mobility: Pt ambulated ~20 ft in room with CGA/HHA.  · Pt with one significant instance of LOB on the way to sink requiring Min A to correct.  · Impaired balance and postural sway noted during activity.    Activities of Daily Living:  · Feeding:  Set up while supine with HOB elevated.  · Grooming: SBA-CGA for washing hands while standing at sink.  · Mild postural sway observed.  · Toileting: Pt with one instance of bowel incontinence at bed level.    · Total A required to perform anne-marie care in standing position; CGA required to maintain standing balance during task.      [facility] 6 Click ADL: 15    Treatment & Education:  *Pt educated on role of OT in acute care setting  *Pt stood for several minutes during anne-marie care after bowel incontinence at bed level  *Pt ambulated within room to address coordination, endurance, and balance needed for  functional mobility.  Cues required for safety required.  *Pt performed grooming task standing at sink; cues required for positioning of body.  *POC reviewed with pt    Patient left HOB elevated with all lines intact, call button in reach, bed alarm on and RN (Lynn) notifiedEducation:  ; AVASYS camera present    GOALS:   Multidisciplinary Problems     Occupational Therapy Goals        Problem: Occupational Therapy Goal    Goal Priority Disciplines Outcome Interventions   Occupational Therapy Goal     OT, PT/OT Ongoing, Progressing    Description: Goals to be met by: 3/7/2022    Patient will increase functional independence with ADLs by performing:    UE Dressing with [place].  LE Dressing with [place].  Grooming while standing at sink with Supervision.  Toileting from toilet with Supervision for hygiene and clothing management.   Toilet transfer to toilet with Supervision.                     Time Tracking:     OT Date of Treatment: 02/28/22  OT Start Time: 1338  OT Stop Time: 1410  OT Total Time (min): 32 min    Billable Minutes:Self Care/Home Management 20  Therapeutic Activity 12    OT/DUTCH: OT          2/28/2022     Denies

## 2022-08-12 NOTE — H&P PST ADULT - PROBLEM SELECTOR PLAN 1
Scheduled for Esophagogastroduodenoscopy, Endoscopic Ultrasound with Fine Needle Aspiration with MD Dov Garrido on 8/16/22.

## 2022-08-12 NOTE — H&P PST ADULT - PROBLEM SELECTOR PLAN 4
Caprini Score 6: Moderate Risk , pharmacologic VTE prophylaxis is indicated for most patients (in the absence of contraindications)

## 2022-08-12 NOTE — H&P PST ADULT - NSICDXPASTSURGICALHX_GEN_ALL_CORE_FT
PAST SURGICAL HISTORY:  H/O cataract extraction  b/l    H/O  section     H/O total knee replacement, right     History of appendectomy     S/P cholecystectomy 1964

## 2022-08-12 NOTE — H&P PST ADULT - NSICDXPASTMEDICALHX_GEN_ALL_CORE_FT
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

## 2022-08-12 NOTE — H&P PST ADULT - ASSESSMENT
89 y/o female with pmh of hypothyroidism, HTN, HDL, Glaucoma, OA, GERD, arrive from home to PST with complaints of diarrhea and esophageal burning. Upon assessment patient has upper & lower dentures, clear throat no erythema. Clear lung sounds throughout. Abdomen soft non tender, non distended, bowl sounds throughout. Pt instructed to stop vitamins/supplements/herbal medications/ASA/NSAIDS for one week prior to surgery and discuss with PMD. Intermediate/High Risk, Stop Bang Score, MAXIMO precautions. Patient educated on surgical scrub, COVID testing, preadmission instructions, medical clearance and day of procedure medications, verbalizes understanding.   CAPRINI SCORE    AGE RELATED RISK FACTORS                                                             [ ] Age 41-60 years                                            (1 Point)  [ ] Age: 61-74 years                                           (2 Points)                 [x ] Age= 75 years                                                (3 Points)             DISEASE RELATED RISK FACTORS                                                       [ ] Edema in the lower extremities                 (1 Point)                     [ ] Varicose veins                                               (1 Point)                                 [x ] BMI > 25 Kg/m2                                            (1 Point)                                  [ ] Serious infection (ie PNA)                            (1 Point)                     [ ] Lung disease ( COPD, Emphysema)            (1 Point)                                                                          [ ] Acute myocardial infarction                         (1 Point)                  [ ] Congestive heart failure (in the previous month)  (1 Point)         [ ] Inflammatory bowel disease                            (1 Point)                  [ ] Central venous access, PICC or Port               (2 points)       (within the last month)                                                                [ ] Stroke (in the previous month)                        (5 Points)    [ ] Previous or present malignancy                       (2 points)                                                                                                                                                         HEMATOLOGY RELATED FACTORS                                                         [ ] Prior episodes of VTE                                     (3 Points)                     [ ] Positive family history for VTE                      (3 Points)                  [ ] Prothrombin 53137 A                                     (3 Points)                     [ ] Factor V Leiden                                                (3 Points)                        [ ] Lupus anticoagulants                                      (3 Points)                                                           [ ] Anticardiolipin antibodies                              (3 Points)                                                       [ ] High homocysteine in the blood                   (3 Points)                                             [ ] Other congenital or acquired thrombophilia      (3 Points)                                                [ ] Heparin induced thrombocytopenia                  (3 Points)                                        MOBILITY RELATED FACTORS  [ ] Bed rest                                                         (1 Point)  [ ] Plaster cast                                                    (2 points)  [ ] Bed bound for more than 72 hours           (2 Points)    GENDER SPECIFIC FACTORS  [ ] Pregnancy or had a baby within the last month   (1 Point)  [ ] Post-partum < 6 weeks                                   (1 Point)  [ ] Hormonal therapy  or oral contraception   (1 Point)  [ ] History of pregnancy complications              (1 point)  [ ] Unexplained or recurrent              (1 Point)    OTHER RISK FACTORS                                           (1 Point)  [ ] BMI >40, smoking, diabetes requiring insulin, chemotherapy  blood transfusions and length of surgery over 2 hours    SURGERY RELATED RISK FACTORS  [ ]  Section within the last month     (1 Point)  [ ] Minor surgery                                                  (1 Point)  [ ] Arthroscopic surgery                                       (2 Points)  [x ] Planned major surgery lasting more            (2 Points)      than 45 minutes     [ ] Elective hip or knee joint replacement       (5 points)       surgery                                                TRAUMA RELATED RISK FACTORS  [ ] Fracture of the hip, pelvis, or leg                       (5 Points)  [ ] Spinal cord injury resulting in paralysis             (5 points)       (in the previous month)    [ ] Paralysis  (less than 1 month)                             (5 Points)  [ ] Multiple Trauma within 1 month                        (5 Points)    Total Score [    6    ]    Caprini Score 0-2: Low Risk, NO VTE prophylaxis required for most patients, encourage ambulation  Caprini Score 3-6: Moderate Risk , pharmacologic VTE prophylaxis is indicated for most patients (in the absence of contraindications)  Caprini Score Greater than or =7: High risk, pharmocologic VTE prophylaxis indicated for most patients (in the absence of contraindications)    OPIOID RISK TOOL    DELMA EACH BOX THAT APPLIES AND ADD TOTALS AT THE END    FAMILY HISTORY OF SUBSTANCE ABUSE                 FEMALE         MALE                                                Alcohol                             [  ]1 pt          [  ]3pts                                               Illegal Durgs                     [  ]2 pts        [  ]3pts                                               Rx Drugs                           [  ]4 pts        [  ]4 pts    PERSONAL HISTORY OF SUBSTANCE ABUSE                                                                                          Alcohol                             [  ]3 pts       [  ]3 pts                                               Illegal Durgs                     [  ]4 pts        [  ]4 pts                                               Rx Drugs                           [  ]5 pts        [  ]5 pts    AGE BETWEEN 16-45 YEARS                                      [  ]1 pt         [  ]1 pt    HISTORY OF PREADOLESCENT   SEXUAL ABUSE                                                             [  ]3 pts        [  ]0pts    PSYCHOLOGICAL DISEASE                     ADD, OCD, Bipolar, Schizophrenia        [  ]2 pts         [  ]2 pts                      Depression                                               [ x ]1 pt           [  ]1 pt           SCORING TOTAL   (add numbers and type here)              (*1**)                                     A score of 3 or lower indicated LOW risk for future opiod abuse  A score of 4 to 7 indicated moderate risk for future opiod abuse  A score of 8 or higher indicates a high risk for opiod abuse

## 2022-08-12 NOTE — H&P PST ADULT - HISTORY OF PRESENT ILLNESS
acid reflux feeling a few months, July did ct abdoemn said diverticulits  found mass that has gotten larger LUQ area?  pooping frequently x 1 month   frequent diareah  no pain  biopsy is of mass     88 y/o female with pmh of hypothyroidism, HTN, HDL, Glaucoma, OA, GERD, arrive from home to PST with complaints of diarrhea and esophageal burning. Reports for the last month she has had increased diarrhea and was diagnosed with diverticulitis. Imaging showed an increase in a mass located in upper left side of abdomen. Results of MRCP with dilated ducts in both the PD and CBD. Status postcholecystectomy. No choledocholithiasis. No obvious stricture. Have a fullness at the papilla is identified possibly representing a mass or pancreatic neoplasm. Patient has a history of neurofibromatosis and has fibrotic nodules all over her body. Unclear if this is a contributing factor. Clinically doing okay. Some lethargy noted. No fever. No jaundice. Denies any pain at this time. Scheduled for Esophagogastroduodenoscopy, Endoscopic Ultrasound with Fine Needle Aspiration with MD Dov Garrido on 8/16/22.  Medical Clearance Pending

## 2022-08-13 LAB — SARS-COV-2 RNA SPEC QL NAA+PROBE: SIGNIFICANT CHANGE UP

## 2022-08-16 ENCOUNTER — RESULT REVIEW (OUTPATIENT)
Age: 87
End: 2022-08-16

## 2022-08-16 ENCOUNTER — OUTPATIENT (OUTPATIENT)
Dept: OUTPATIENT SERVICES | Facility: HOSPITAL | Age: 87
LOS: 1 days | End: 2022-08-16
Payer: MEDICARE

## 2022-08-16 ENCOUNTER — TRANSCRIPTION ENCOUNTER (OUTPATIENT)
Age: 87
End: 2022-08-16

## 2022-08-16 ENCOUNTER — APPOINTMENT (OUTPATIENT)
Dept: GASTROENTEROLOGY | Facility: HOSPITAL | Age: 87
End: 2022-08-16

## 2022-08-16 DIAGNOSIS — Z98.49 CATARACT EXTRACTION STATUS, UNSPECIFIED EYE: Chronic | ICD-10-CM

## 2022-08-16 DIAGNOSIS — Z98.891 HISTORY OF UTERINE SCAR FROM PREVIOUS SURGERY: Chronic | ICD-10-CM

## 2022-08-16 DIAGNOSIS — Z90.49 ACQUIRED ABSENCE OF OTHER SPECIFIED PARTS OF DIGESTIVE TRACT: Chronic | ICD-10-CM

## 2022-08-16 DIAGNOSIS — Z96.651 PRESENCE OF RIGHT ARTIFICIAL KNEE JOINT: Chronic | ICD-10-CM

## 2022-08-16 DIAGNOSIS — R93.3 ABNORMAL FINDINGS ON DIAGNOSTIC IMAGING OF OTHER PARTS OF DIGESTIVE TRACT: ICD-10-CM

## 2022-08-16 PROCEDURE — 88305 TISSUE EXAM BY PATHOLOGIST: CPT | Mod: 26

## 2022-08-16 PROCEDURE — 88342 IMHCHEM/IMCYTCHM 1ST ANTB: CPT

## 2022-08-16 PROCEDURE — 43259 EGD US EXAM DUODENUM/JEJUNUM: CPT

## 2022-08-16 PROCEDURE — 43239 EGD BIOPSY SINGLE/MULTIPLE: CPT

## 2022-08-16 PROCEDURE — 88305 TISSUE EXAM BY PATHOLOGIST: CPT

## 2022-08-16 PROCEDURE — 88342 IMHCHEM/IMCYTCHM 1ST ANTB: CPT | Mod: 26

## 2022-08-22 LAB — SURGICAL PATHOLOGY STUDY: SIGNIFICANT CHANGE UP

## 2022-08-23 ENCOUNTER — NON-APPOINTMENT (OUTPATIENT)
Age: 87
End: 2022-08-23

## 2022-08-24 ENCOUNTER — RESULT REVIEW (OUTPATIENT)
Age: 87
End: 2022-08-24

## 2022-09-20 ENCOUNTER — APPOINTMENT (OUTPATIENT)
Dept: GASTROENTEROLOGY | Facility: CLINIC | Age: 87
End: 2022-09-20

## 2022-09-20 VITALS
WEIGHT: 150 LBS | DIASTOLIC BLOOD PRESSURE: 70 MMHG | RESPIRATION RATE: 14 BRPM | OXYGEN SATURATION: 98 % | HEART RATE: 101 BPM | SYSTOLIC BLOOD PRESSURE: 114 MMHG | TEMPERATURE: 97.3 F | BODY MASS INDEX: 31.49 KG/M2 | HEIGHT: 58 IN

## 2022-09-20 DIAGNOSIS — C44.42 SQUAMOUS CELL CARCINOMA OF SKIN OF SCALP AND NECK: ICD-10-CM

## 2022-09-20 PROBLEM — Q85.09 OTHER NEUROFIBROMATOSIS: Chronic | Status: ACTIVE | Noted: 2022-08-12

## 2022-09-20 PROBLEM — K57.92 DIVERTICULITIS OF INTESTINE, PART UNSPECIFIED, WITHOUT PERFORATION OR ABSCESS WITHOUT BLEEDING: Chronic | Status: ACTIVE | Noted: 2022-08-12

## 2022-09-20 PROBLEM — F41.9 ANXIETY DISORDER, UNSPECIFIED: Chronic | Status: ACTIVE | Noted: 2022-08-12

## 2022-09-20 PROBLEM — H40.9 UNSPECIFIED GLAUCOMA: Chronic | Status: ACTIVE | Noted: 2022-08-12

## 2022-09-20 PROCEDURE — 99214 OFFICE O/P EST MOD 30 MIN: CPT

## 2022-09-20 RX ORDER — AMOXICILLIN AND CLAVULANATE POTASSIUM 875; 125 MG/1; MG/1
875-125 TABLET, COATED ORAL
Qty: 21 | Refills: 0 | Status: DISCONTINUED | COMMUNITY
Start: 2022-07-14 | End: 2022-09-20

## 2022-09-21 NOTE — HISTORY OF PRESENT ILLNESS
[FreeTextEntry1] : 89-year-old white female with history of neurofibromatosis and hypothyroidism osteoporosis hypertension osteoarthritis was referred for finding of dilated CBD and PD.  LFTs have been repeatedly normal.  MRCP showed no evidence for CBD stone with question of lesion at the papilla of Vater.  US and FNA were performed by JS on 8/16/2022.  A tiny mass at the ampulla was identified.  Biopsies were performed.  No malignancy.  2 a small adenoma was noted with mild atypia of the cells.  No advanced lesion.  No malignancy.\par Tolerated procedure well.  No postprocedural issues or problems.\par Still notes that she has diarrhea about once a week for no clear reason.  On regular diet.  Weight maintained.  No fever.  No jaundice.  No weight loss.  No back pain.  \par Fair appetite.  No weight loss.  No foreign travel.  No recent antibiotics.  No ill family members or contacts.\par  [de-identified] : 8/16/2022: Vazquez's esophagus both endoscopically and on path reports.  No dysplasia.  Stomach and duodenum otherwise unremarkable. [de-identified] : 8/16/2022 by PHILLIP.  Dilated pancreatic duct to head of pancreas.  Dilated CBD to head of pancreas.  No pancreatic tumor.  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  Minor atypia.  No advanced lesion or cancer.

## 2022-09-21 NOTE — PHYSICAL EXAM
[Alert] : alert [Normal Voice/Communication] : normal voice/communication [Healthy Appearing] : healthy appearing [No Acute Distress] : no acute distress [Sclera] : the sclera and conjunctiva were normal [Hearing Threshold Finger Rub Not [place]] : hearing was normal [Normal Lips/Gums] : the lips and gums were normal [Oropharynx] : the oropharynx was normal [Normal Appearance] : the appearance of the neck was normal [No Neck Mass] : no neck mass was observed [No Respiratory Distress] : no respiratory distress [No Acc Muscle Use] : no accessory muscle use [Respiration, Rhythm And Depth] : normal respiratory rhythm and effort [Auscultation Breath Sounds / Voice Sounds] : lungs were clear to auscultation bilaterally [Heart Rate And Rhythm] : heart rate was normal and rhythm regular [Normal S1, S2] : normal S1 and S2 [Murmurs] : no murmurs [Normal] : normal bowel sounds, non-tender, no masses, soft, no no hepato-splenomegaly [Bowel Sounds] : normal bowel sounds [Abdomen Tenderness] : non-tender [No Masses] : no abdominal mass palpated [Abdomen Soft] : soft [] : no hepatosplenomegaly [Oriented To Time, Place, And Person] : oriented to person, place, and time [de-identified] : Appears stated age.  Ambulates slowly with the aid of a walker.  Accompanied by son and great granddaughter. [FreeTextEntry1] : Anicteric sclera. [de-identified] : Moist mucosa. [de-identified] : No organomegaly.  No mass tenderness or rebound.  No distention.  No adenopathy.

## 2022-09-21 NOTE — ASSESSMENT
[FreeTextEntry1] : Incidental finding of dilated ducts both pancreatic and CBD.  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  No overt malignancy.  Minor atypia are expected with that diagnosis.  FTs remain normal.  She remains asymptomatic in this regard.  Nothing to do other than observation.\par Incidental finding of Vazquez's esophagus short segment confirmed with biopsies.  Remain on chronic PPI therapy, lifelong.  If robust may consider repeat upper endoscopy in 1 year.  Maintain antireflux diet.  New Rx for pantoprazole submitted at 40 mg p.o. daily\par Diarrhea of unclear etiology.  Not  clearly identified to be infectious.  Okay for as needed use of Imodium.\par GI office follow-up here in 6 months.

## 2022-09-23 NOTE — H&P ADULT - NSHPLANGLIMITEDENGLISH_GEN_A_CORE
September 23, 2022        Xander Goode  [address]  [facility] [id number]    Neeta Terrell,      Your health and wellness have never been more important. We have been trying to reach you to talk to you about scheduling a health and wellness visit to help you better manage your health and stay safe during these uncertain times.  This visit can be done in the comfort of your home with a Nurse Practitioner from [facility].      Key benefits for YOU?   1. Convenience: Our Nurse Practitioner can do screenings in your home that you would have to go out to the clinic for. They will talk about important care for you and help you set health goals for the new year.    2. Health exam:  The Nurse Practitioner will check your blood pressure, screen for diabetes, and check your lungs by doing a quick breathing test.   3. Better manage your health: The friendly, quality provider will review your medications, medical history, and talk about your health concerns and questions.  4. Take your time: The appointment is 45 minutes, allowing plenty of time to get to know your provider, review your health information, and talk about your health goals.  5. Get the care you need: The provider will share your health information, concerns, and priorities with your doctor, so that your doctor and care team can help you get the care you need, when you need it.    Next steps  To take advantage of this new program, please call us back to schedule an appointment at [phone], Monday - Friday, 8 a.m. ? 4 p.m. CST.  We can also answer any questions you may have about this appointment.      Be well!  Your health care partners at [facility]       
No

## 2022-09-27 DIAGNOSIS — R10.817 GENERALIZED ABDOMINAL TENDERNESS: ICD-10-CM

## 2022-09-28 ENCOUNTER — APPOINTMENT (OUTPATIENT)
Dept: GASTROENTEROLOGY | Facility: CLINIC | Age: 87
End: 2022-09-28

## 2022-09-28 VITALS
TEMPERATURE: 98.7 F | WEIGHT: 150 LBS | BODY MASS INDEX: 31.49 KG/M2 | RESPIRATION RATE: 16 BRPM | SYSTOLIC BLOOD PRESSURE: 148 MMHG | DIASTOLIC BLOOD PRESSURE: 88 MMHG | OXYGEN SATURATION: 96 % | HEIGHT: 58 IN | HEART RATE: 75 BPM

## 2022-09-28 DIAGNOSIS — M16.0 BILATERAL PRIMARY OSTEOARTHRITIS OF HIP: ICD-10-CM

## 2022-09-28 DIAGNOSIS — M19.049 PRIMARY OSTEOARTHRITIS, UNSPECIFIED HAND: ICD-10-CM

## 2022-09-28 DIAGNOSIS — R19.7 DIARRHEA, UNSPECIFIED: ICD-10-CM

## 2022-09-28 DIAGNOSIS — M81.0 AGE-RELATED OSTEOPOROSIS W/OUT CURRENT PATHOLOGICAL FRACTURE: ICD-10-CM

## 2022-09-28 LAB
ALBUMIN SERPL ELPH-MCNC: 4.4 G/DL
ALP BLD-CCNC: 68 U/L
ALT SERPL-CCNC: 17 U/L
ANION GAP SERPL CALC-SCNC: 14 MMOL/L
AST SERPL-CCNC: 19 U/L
BASOPHILS # BLD AUTO: 0.07 K/UL
BASOPHILS NFR BLD AUTO: 1 %
BILIRUB SERPL-MCNC: 0.6 MG/DL
BUN SERPL-MCNC: 14 MG/DL
CALCIUM SERPL-MCNC: 9.8 MG/DL
CHLORIDE SERPL-SCNC: 106 MMOL/L
CO2 SERPL-SCNC: 25 MMOL/L
CREAT SERPL-MCNC: 0.67 MG/DL
EGFR: 84 ML/MIN/1.73M2
EOSINOPHIL # BLD AUTO: 0.12 K/UL
EOSINOPHIL NFR BLD AUTO: 1.7 %
GLUCOSE SERPL-MCNC: 106 MG/DL
HCT VFR BLD CALC: 45.7 %
HGB BLD-MCNC: 14.5 G/DL
IMM GRANULOCYTES NFR BLD AUTO: 0.3 %
INR PPP: 1.09 RATIO
LYMPHOCYTES # BLD AUTO: 1.31 K/UL
LYMPHOCYTES NFR BLD AUTO: 19.1 %
MAN DIFF?: NORMAL
MCHC RBC-ENTMCNC: 30.7 PG
MCHC RBC-ENTMCNC: 31.7 GM/DL
MCV RBC AUTO: 96.6 FL
MONOCYTES # BLD AUTO: 0.72 K/UL
MONOCYTES NFR BLD AUTO: 10.5 %
NEUTROPHILS # BLD AUTO: 4.62 K/UL
NEUTROPHILS NFR BLD AUTO: 67.4 %
PLATELET # BLD AUTO: 219 K/UL
POTASSIUM SERPL-SCNC: 4.6 MMOL/L
PROT SERPL-MCNC: 6.8 G/DL
PT BLD: 12.9 SEC
RBC # BLD: 4.73 M/UL
RBC # FLD: 13.1 %
SODIUM SERPL-SCNC: 145 MMOL/L
WBC # FLD AUTO: 6.86 K/UL

## 2022-09-28 PROCEDURE — 99214 OFFICE O/P EST MOD 30 MIN: CPT

## 2022-09-28 PROCEDURE — 82270 OCCULT BLOOD FECES: CPT

## 2022-09-28 RX ORDER — AMLODIPINE BESYLATE 2.5 MG/1
2.5 TABLET ORAL
Qty: 90 | Refills: 0 | Status: ACTIVE | COMMUNITY
Start: 2022-07-21

## 2022-09-28 RX ORDER — OXYBUTYNIN CHLORIDE 5 MG/1
5 TABLET, EXTENDED RELEASE ORAL
Qty: 90 | Refills: 0 | Status: ACTIVE | COMMUNITY
Start: 2022-05-09

## 2022-09-28 RX ORDER — SIMVASTATIN 40 MG/1
40 TABLET, FILM COATED ORAL
Refills: 0 | Status: ACTIVE | COMMUNITY

## 2022-09-28 RX ORDER — PANTOPRAZOLE 40 MG/1
40 TABLET, DELAYED RELEASE ORAL
Qty: 90 | Refills: 1 | Status: ACTIVE | COMMUNITY
Start: 2022-09-21

## 2022-09-28 RX ORDER — ASPIRIN 81 MG/1
81 TABLET, COATED ORAL
Qty: 90 | Refills: 0 | Status: ACTIVE | COMMUNITY
Start: 2022-03-07

## 2022-09-28 RX ORDER — ALENDRONATE SODIUM 70 MG/1
70 TABLET ORAL
Refills: 0 | Status: ACTIVE | COMMUNITY

## 2022-09-28 RX ORDER — BRIMONIDINE TARTRATE 1.5 MG/ML
0.15 SOLUTION/ DROPS OPHTHALMIC
Refills: 0 | Status: ACTIVE | COMMUNITY

## 2022-09-28 RX ORDER — POTASSIUM CHLORIDE 750 MG/1
10 TABLET, FILM COATED, EXTENDED RELEASE ORAL
Qty: 90 | Refills: 0 | Status: ACTIVE | COMMUNITY
Start: 2022-07-21

## 2022-09-28 RX ORDER — LEVOTHYROXINE SODIUM 0.1 MG/1
100 TABLET ORAL
Refills: 0 | Status: ACTIVE | COMMUNITY

## 2022-09-28 NOTE — ASSESSMENT
[FreeTextEntry1] : Acute lower GI bleeding of 100 severity.  Patient seems to have stopped bleeding spontaneously.  Etiology unclear.  Possible diverticular hemorrhage or hemorrhage from AVM or Dellifoy lesion.  Unlikely to be neoplastic in origin.  Given lack of symptoms unlikely to be related to bowel ischemia.  Results of blood work from this morning and not currently available.  A CAT angio of the abdomen and pelvis has been requested to identify any potential areas of ischemic colitis.  A colonoscopy is indicated for assistance in identification of source of GI bleeding.  The procedure, its risk benefits and prep, were explained to the patient and son, who understand and are agreeable to proceed.  ASA #3.  Mallampati #2.  Avalide prep to be utilized.  Blood work to be repeated prior to the procedure to include CBC, CMP, coags, nasal swab for COVID/PCR.  No clearances required.  Patient is in optimal medical condition to undergo planned procedure.  Arrangements made.  Results to follow.  Anticipated to have colonoscopy performed in the next 4 to 6 weeks.  Call for problems.\par If recurrent major hemorrhage then hospitalize for further eval and treatment.

## 2022-09-28 NOTE — CONSULT LETTER
[Dear  ___] : Dear  [unfilled], [Consult Letter:] : I had the pleasure of evaluating your patient, [unfilled]. [Please see my note below.] : Please see my note below. [Consult Closing:] : Thank you very much for allowing me to participate in the care of this patient.  If you have any questions, please do not hesitate to contact me. [Sincerely,] : Sincerely, [FreeTextEntry1] : Recent bout of fairly significant lower GI bleeding.  Seemingly has stopped spontaneously.  We will continue work-up with CT angio and colonoscopy.  Serial blood work arranged. [FreeTextEntry3] : Olu Elizabeth MD FACG\par Diplomate American Board of Internal Medicine and Gastroenterolgy\par [facility]\par

## 2022-09-28 NOTE — PHYSICAL EXAM
[Alert] : alert [Normal Voice/Communication] : normal voice/communication [Healthy Appearing] : healthy appearing [No Acute Distress] : no acute distress [Sclera] : the sclera and conjunctiva were normal [Hearing Threshold Finger Rub Not [place]] : hearing was normal [Normal Lips/Gums] : the lips and gums were normal [Oropharynx] : the oropharynx was normal [Normal Appearance] : the appearance of the neck was normal [No Neck Mass] : no neck mass was observed [No Respiratory Distress] : no respiratory distress [No Acc Muscle Use] : no accessory muscle use [Respiration, Rhythm And Depth] : normal respiratory rhythm and effort [Auscultation Breath Sounds / Voice Sounds] : lungs were clear to auscultation bilaterally [Heart Rate And Rhythm] : heart rate was normal and rhythm regular [Normal S1, S2] : normal S1 and S2 [Murmurs] : no murmurs [No Edema] : no edema [Normal] : normal bowel sounds, non-tender, no masses, soft, no no hepato-splenomegaly [Bowel Sounds] : normal bowel sounds [Abdomen Tenderness] : non-tender [No Masses] : no abdominal mass palpated [Abdomen Soft] : soft [] : no hepatosplenomegaly [Oriented To Time, Place, And Person] : oriented to person, place, and time [de-identified] : Diffuse neurofibromatosis.  Robust, energetic, coherent, thoughtful. [de-identified] : Right upper quadrant cholecystectomy scar, vertical appendectomy scar.  Vertical hysterectomy scar.  No organomegaly.  No mass tenderness or rebound. [de-identified] : Normal tone.  Small external hemorrhoids.  Inactive.  No palpable anal canal or internal lesions.  Small amount of stool present in the rectal vault.  Stool color is light brown.  No occult blood is negative.  No gross hematochezia.  No palpable lesions.

## 2022-09-28 NOTE — HISTORY OF PRESENT ILLNESS
[FreeTextEntry1] : 89-year-old white female with history of neurofibromatosis, osteopenia, osteoporosis.  Irritable bladder syndrome.\par Recently evaluated for finding of dilated ducts on imaging studies.  LFTs had remained normal.  Sonogram CAT scan, MRI performed.  No choledocholithiasis.  Remote cholecystectomy.  No mass in head of pancreas or cholangiocarcinoma.  Noted to have lesion in ampulla biopsy-proven to be tubular adenoma.  Low grade dysplasia.\par Patient has been well post endoscopic procedures.  Her last colonoscopy was about 9 years ago but she cannot recall results.  No results available on current chart.\par Patient has had bouts of diarrhea or intermittently over multiple years.  All stool studies were unrevealing.  No history of diabetes.  No use of metformin.  No recent antibiotics.  No weight loss.  Not toxic.\par On 9/24 patient's noted low-volume hematochezia.  On 9/25 she had several bouts of painless gross hematochezia mostly bright red blood per rectum.  She noted some dizziness and orthostatic symptoms but decided to write it out.  Several bowel movements bloody were noted on 9/26.  Last blood per rectum was noted very early on 9/27.  A subsequent brown bowel movement midday and 9/27 showed no blood.  No bowel movement since that time.  Patient had initially been fasting but then has resumed oral intake.\par Patient is unclear if she has ever been diagnosed with diverticulosis or AVMs.  Review of recent CAT scans x3 make no mention of diverticular disease.\radha Currently feels well.  Appetite has returned to normal.  Denies abdominal pain.  Minimal cramping with initial bleeding.  No fever or chills.  No hematuria or pyuria.  Not toxic appearing.\par Patient believes she has hemorrhoids but they have not been particularly active of late. [de-identified] : 8/16/2022.  Short segment Vazquez's esophagus.  No dysplasia.  Prominent papilla. [de-identified] : 8/16/2022 adenoma at the papilla.  No evidence for choledocholithiasis or pancreatico lithiasis.  No mass in HOP.  No cholangiocarcinoma.

## 2022-09-28 NOTE — REASON FOR VISIT
[Follow-up] : a follow-up of an existing diagnosis [FreeTextEntry1] : Hematochezia, lower GI hemorrhage for 3 days.

## 2022-09-29 ENCOUNTER — NON-APPOINTMENT (OUTPATIENT)
Age: 87
End: 2022-09-29

## 2022-09-30 ENCOUNTER — APPOINTMENT (OUTPATIENT)
Dept: CT IMAGING | Facility: CLINIC | Age: 87
End: 2022-09-30

## 2022-09-30 ENCOUNTER — OUTPATIENT (OUTPATIENT)
Dept: OUTPATIENT SERVICES | Facility: HOSPITAL | Age: 87
LOS: 1 days | End: 2022-09-30
Payer: MEDICARE

## 2022-09-30 DIAGNOSIS — K92.1 MELENA: ICD-10-CM

## 2022-09-30 DIAGNOSIS — Z96.651 PRESENCE OF RIGHT ARTIFICIAL KNEE JOINT: Chronic | ICD-10-CM

## 2022-09-30 DIAGNOSIS — Z90.49 ACQUIRED ABSENCE OF OTHER SPECIFIED PARTS OF DIGESTIVE TRACT: Chronic | ICD-10-CM

## 2022-09-30 DIAGNOSIS — Z98.49 CATARACT EXTRACTION STATUS, UNSPECIFIED EYE: Chronic | ICD-10-CM

## 2022-09-30 DIAGNOSIS — Z98.891 HISTORY OF UTERINE SCAR FROM PREVIOUS SURGERY: Chronic | ICD-10-CM

## 2022-09-30 PROCEDURE — 74174 CTA ABD&PLVS W/CONTRAST: CPT

## 2022-09-30 PROCEDURE — 74174 CTA ABD&PLVS W/CONTRAST: CPT | Mod: 26,MH

## 2022-10-01 LAB
ALBUMIN SERPL ELPH-MCNC: 4.2 G/DL
ALP BLD-CCNC: 65 U/L
ALT SERPL-CCNC: 16 U/L
ANION GAP SERPL CALC-SCNC: 12 MMOL/L
AST SERPL-CCNC: 19 U/L
BASOPHILS # BLD AUTO: 0.08 K/UL
BASOPHILS NFR BLD AUTO: 1 %
BILIRUB SERPL-MCNC: 0.6 MG/DL
BUN SERPL-MCNC: 14 MG/DL
CALCIUM SERPL-MCNC: 9.3 MG/DL
CHLORIDE SERPL-SCNC: 104 MMOL/L
CO2 SERPL-SCNC: 25 MMOL/L
CREAT SERPL-MCNC: 0.55 MG/DL
EGFR: 88 ML/MIN/1.73M2
EOSINOPHIL # BLD AUTO: 0.19 K/UL
EOSINOPHIL NFR BLD AUTO: 2.3 %
GLUCOSE SERPL-MCNC: 89 MG/DL
HCT VFR BLD CALC: 43.4 %
HGB BLD-MCNC: 13.6 G/DL
IMM GRANULOCYTES NFR BLD AUTO: 0.4 %
INR PPP: 1.14 RATIO
LYMPHOCYTES # BLD AUTO: 1.43 K/UL
LYMPHOCYTES NFR BLD AUTO: 17.7 %
MAN DIFF?: NORMAL
MCHC RBC-ENTMCNC: 30 PG
MCHC RBC-ENTMCNC: 31.3 GM/DL
MCV RBC AUTO: 95.6 FL
MONOCYTES # BLD AUTO: 0.95 K/UL
MONOCYTES NFR BLD AUTO: 11.7 %
NEUTROPHILS # BLD AUTO: 5.42 K/UL
NEUTROPHILS NFR BLD AUTO: 66.9 %
PLATELET # BLD AUTO: 233 K/UL
POTASSIUM SERPL-SCNC: 4.5 MMOL/L
PROT SERPL-MCNC: 6.4 G/DL
PT BLD: 13.2 SEC
RBC # BLD: 4.54 M/UL
RBC # FLD: 13.2 %
SODIUM SERPL-SCNC: 141 MMOL/L
WBC # FLD AUTO: 8.1 K/UL

## 2022-11-07 LAB — SARS-COV-2 N GENE NPH QL NAA+PROBE: NOT DETECTED

## 2022-11-09 ENCOUNTER — TRANSCRIPTION ENCOUNTER (OUTPATIENT)
Age: 87
End: 2022-11-09

## 2022-11-09 ENCOUNTER — OFFICE (OUTPATIENT)
Dept: URBAN - METROPOLITAN AREA CLINIC 115 | Facility: CLINIC | Age: 87
Setting detail: OPHTHALMOLOGY
End: 2022-11-09
Payer: MEDICARE

## 2022-11-09 DIAGNOSIS — H01.004: ICD-10-CM

## 2022-11-09 DIAGNOSIS — H40.1132: ICD-10-CM

## 2022-11-09 DIAGNOSIS — H35.40: ICD-10-CM

## 2022-11-09 DIAGNOSIS — H53.433: ICD-10-CM

## 2022-11-09 DIAGNOSIS — Z96.1: ICD-10-CM

## 2022-11-09 DIAGNOSIS — H43.813: ICD-10-CM

## 2022-11-09 DIAGNOSIS — H04.122: ICD-10-CM

## 2022-11-09 DIAGNOSIS — H26.492: ICD-10-CM

## 2022-11-09 DIAGNOSIS — H47.211: ICD-10-CM

## 2022-11-09 DIAGNOSIS — H01.001: ICD-10-CM

## 2022-11-09 DIAGNOSIS — H26.491: ICD-10-CM

## 2022-11-09 DIAGNOSIS — H35.033: ICD-10-CM

## 2022-11-09 PROCEDURE — 99213 OFFICE O/P EST LOW 20 MIN: CPT | Performed by: OPHTHALMOLOGY

## 2022-11-09 PROCEDURE — 92083 EXTENDED VISUAL FIELD XM: CPT | Performed by: OPHTHALMOLOGY

## 2022-11-09 PROCEDURE — 92133 CPTRZD OPH DX IMG PST SGM ON: CPT | Performed by: OPHTHALMOLOGY

## 2022-11-09 ASSESSMENT — VISUAL ACUITY
OS_BCVA: 20/60-
OD_BCVA: 20/50-

## 2022-11-09 ASSESSMENT — REFRACTION_MANIFEST
OS_SPHERE: -1.25
OD_SPHERE: -0.25
OS_VA1: 20/25-
OS_ADD: +2.25
OS_AXIS: 125
OS_CYLINDER: -0.50
OD_VA1: 20/25-
OD_ADD: +2.25

## 2022-11-09 ASSESSMENT — REFRACTION_CURRENTRX
OD_VPRISM_DIRECTION: SV
OS_OVR_VA: 20/
OS_AXIS: 123
OD_SPHERE: -0.25
OS_CYLINDER: -0.50
OD_OVR_VA: 20/
OS_SPHERE: +1.00
OS_VPRISM_DIRECTION: SV

## 2022-11-09 ASSESSMENT — REFRACTION_AUTOREFRACTION
OD_CYLINDER: -1.75
OS_SPHERE: UTP
OD_AXIS: 117
OD_SPHERE: -0.50

## 2022-11-09 ASSESSMENT — SPHEQUIV_DERIVED
OD_SPHEQUIV: -1.375
OS_SPHEQUIV: -1.5

## 2022-11-09 ASSESSMENT — LID EXAM ASSESSMENTS
OS_BLEPHARITIS: LUL T
OD_BLEPHARITIS: RUL T

## 2022-11-09 ASSESSMENT — PACHYMETRY
OD_CT_CORRECTION: 4
OS_CT_CORRECTION: 5
OS_CT_UM: 472
OD_CT_UM: 492

## 2022-11-09 ASSESSMENT — CONFRONTATIONAL VISUAL FIELD TEST (CVF)
OD_FINDINGS: FULL
OS_FINDINGS: FULL

## 2022-11-10 ENCOUNTER — APPOINTMENT (OUTPATIENT)
Dept: GASTROENTEROLOGY | Facility: GI CENTER | Age: 87
End: 2022-11-10

## 2022-11-10 ENCOUNTER — OUTPATIENT (OUTPATIENT)
Dept: OUTPATIENT SERVICES | Facility: HOSPITAL | Age: 87
LOS: 1 days | End: 2022-11-10
Payer: MEDICARE

## 2022-11-10 ENCOUNTER — RESULT REVIEW (OUTPATIENT)
Age: 87
End: 2022-11-10

## 2022-11-10 DIAGNOSIS — K52.9 NONINFECTIVE GASTROENTERITIS AND COLITIS, UNSPECIFIED: ICD-10-CM

## 2022-11-10 DIAGNOSIS — Z90.49 ACQUIRED ABSENCE OF OTHER SPECIFIED PARTS OF DIGESTIVE TRACT: Chronic | ICD-10-CM

## 2022-11-10 DIAGNOSIS — Z98.49 CATARACT EXTRACTION STATUS, UNSPECIFIED EYE: Chronic | ICD-10-CM

## 2022-11-10 DIAGNOSIS — Z96.651 PRESENCE OF RIGHT ARTIFICIAL KNEE JOINT: Chronic | ICD-10-CM

## 2022-11-10 DIAGNOSIS — Z98.891 HISTORY OF UTERINE SCAR FROM PREVIOUS SURGERY: Chronic | ICD-10-CM

## 2022-11-10 DIAGNOSIS — K63.5 POLYP OF COLON: ICD-10-CM

## 2022-11-10 DIAGNOSIS — K22.70 BARRETT'S ESOPHAGUS W/OUT DYSPLASIA: ICD-10-CM

## 2022-11-10 DIAGNOSIS — K92.1 MELENA: ICD-10-CM

## 2022-11-10 DIAGNOSIS — R93.3 ABNORMAL FINDINGS ON DIAGNOSTIC IMAGING OF OTHER PARTS OF DIGESTIVE TRACT: ICD-10-CM

## 2022-11-10 PROCEDURE — 88305 TISSUE EXAM BY PATHOLOGIST: CPT | Mod: 26

## 2022-11-10 PROCEDURE — 88305 TISSUE EXAM BY PATHOLOGIST: CPT

## 2022-11-10 PROCEDURE — 45380 COLONOSCOPY AND BIOPSY: CPT

## 2022-11-10 NOTE — PHYSICAL EXAM

## 2022-11-10 NOTE — HISTORY OF PRESENT ILLNESS
[FreeTextEntry1] : 89 yo WF c Neurofibromatosis and OA and abnormal CT scan:  Dilated dusts.  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  Presented with diarrhea then gross hematochezia x 3 days;  no change in Hgb.  Hx of tics.  Not hospitalized.  Resolved spontaneously.  All subsequent BW is normal. \par PMH:  HTN,  Glaucoma.  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

## 2022-11-14 ENCOUNTER — APPOINTMENT (OUTPATIENT)
Dept: COLORECTAL SURGERY | Facility: CLINIC | Age: 87
End: 2022-11-14

## 2022-11-14 VITALS
TEMPERATURE: 97.3 F | DIASTOLIC BLOOD PRESSURE: 84 MMHG | BODY MASS INDEX: 31.49 KG/M2 | HEART RATE: 76 BPM | HEIGHT: 58 IN | RESPIRATION RATE: 14 BRPM | WEIGHT: 150 LBS | SYSTOLIC BLOOD PRESSURE: 131 MMHG

## 2022-11-14 DIAGNOSIS — K64.4 RESIDUAL HEMORRHOIDAL SKIN TAGS: ICD-10-CM

## 2022-11-14 DIAGNOSIS — K64.8 RESIDUAL HEMORRHOIDAL SKIN TAGS: ICD-10-CM

## 2022-11-14 PROCEDURE — 46600 DIAGNOSTIC ANOSCOPY SPX: CPT

## 2022-11-14 PROCEDURE — 99203 OFFICE O/P NEW LOW 30 MIN: CPT | Mod: 25

## 2022-11-14 NOTE — PROCEDURE
[FreeTextEntry1] : Risk and benefit of hemorrhoid banding was reviewed.  Left hemorrhoid was banded x2 above the dentate line.  She tolerated the procedure well.

## 2022-11-14 NOTE — HISTORY OF PRESENT ILLNESS
[FreeTextEntry1] : 89-year-old female who presents for consultation for rectal bleeding from her hemorrhoids.  She is suffering from diarrhea and rectal bleeding which is painless in nature.  She sometimes has bleeding even without bowel movements.  She recently had a colonoscopy with Dr. Elizabeth which showed large grade 3 hemorrhoids and 3 colon polyps were removed.

## 2022-11-14 NOTE — PHYSICAL EXAM
[Excoriation] : no perianal excoriation [Normal] : was normal [None] : there was no rectal mass  [Gross Blood] : no gross blood [Respiratory Effort] : normal respiratory effort [Normal Rate and Rhythm] : normal rate and rhythm [Calm] : calm [de-identified] : Prolapsing internal hemorrhoids [de-identified] : Soft, nontender, nondistended.  No mass or hernias appreciated.  Lower midline and right subcostal incisions noted. [de-identified] : Nonthrombosed external hemorrhoids [de-identified] : Well-appearing, in no distress [de-identified] : Normocephalic, atraumatic [de-identified] : Moves extremities without difficulty [de-identified] : Alert and oriented x3 [de-identified] : Neurofibromatosis lesions.

## 2022-11-14 NOTE — CONSULT LETTER
[Dear  ___] : Dear  [unfilled], [Consult Letter:] : I had the pleasure of evaluating your patient, [unfilled]. [Please see my note below.] : Please see my note below. [Sincerely,] : Sincerely, [Consult Closing:] : Thank you very much for allowing me to participate in the care of this patient.  If you have any questions, please do not hesitate to contact me. [FreeTextEntry3] : Prosper Ritter MD\par

## 2022-11-16 LAB — SURGICAL PATHOLOGY STUDY: SIGNIFICANT CHANGE UP

## 2022-11-29 ENCOUNTER — INPATIENT (INPATIENT)
Facility: HOSPITAL | Age: 87
LOS: 6 days | Discharge: NURSING, REHAB OR LONG-TERM CARE | DRG: 871 | End: 2022-12-06
Admitting: HOSPITALIST
Payer: MEDICARE

## 2022-11-29 VITALS
OXYGEN SATURATION: 99 % | TEMPERATURE: 98 F | DIASTOLIC BLOOD PRESSURE: 54 MMHG | RESPIRATION RATE: 16 BRPM | WEIGHT: 149.91 LBS | HEART RATE: 74 BPM | SYSTOLIC BLOOD PRESSURE: 97 MMHG

## 2022-11-29 DIAGNOSIS — Z98.891 HISTORY OF UTERINE SCAR FROM PREVIOUS SURGERY: Chronic | ICD-10-CM

## 2022-11-29 DIAGNOSIS — Z96.651 PRESENCE OF RIGHT ARTIFICIAL KNEE JOINT: Chronic | ICD-10-CM

## 2022-11-29 DIAGNOSIS — Z90.49 ACQUIRED ABSENCE OF OTHER SPECIFIED PARTS OF DIGESTIVE TRACT: Chronic | ICD-10-CM

## 2022-11-29 DIAGNOSIS — N39.0 URINARY TRACT INFECTION, SITE NOT SPECIFIED: ICD-10-CM

## 2022-11-29 DIAGNOSIS — Z98.49 CATARACT EXTRACTION STATUS, UNSPECIFIED EYE: Chronic | ICD-10-CM

## 2022-11-29 LAB
ALBUMIN SERPL ELPH-MCNC: 3.8 G/DL — SIGNIFICANT CHANGE UP (ref 3.3–5.2)
ALP SERPL-CCNC: 72 U/L — SIGNIFICANT CHANGE UP (ref 40–120)
ALT FLD-CCNC: 18 U/L — SIGNIFICANT CHANGE UP
ANION GAP SERPL CALC-SCNC: 11 MMOL/L — SIGNIFICANT CHANGE UP (ref 5–17)
APPEARANCE UR: CLEAR — SIGNIFICANT CHANGE UP
APTT BLD: 28.3 SEC — SIGNIFICANT CHANGE UP (ref 27.5–35.5)
AST SERPL-CCNC: 27 U/L — SIGNIFICANT CHANGE UP
BACTERIA # UR AUTO: ABNORMAL
BASOPHILS # BLD AUTO: 0 K/UL — SIGNIFICANT CHANGE UP (ref 0–0.2)
BASOPHILS NFR BLD AUTO: 0 % — SIGNIFICANT CHANGE UP (ref 0–2)
BILIRUB SERPL-MCNC: 0.6 MG/DL — SIGNIFICANT CHANGE UP (ref 0.4–2)
BILIRUB UR-MCNC: ABNORMAL
BUN SERPL-MCNC: 17.5 MG/DL — SIGNIFICANT CHANGE UP (ref 8–20)
CALCIUM SERPL-MCNC: 9 MG/DL — SIGNIFICANT CHANGE UP (ref 8.4–10.5)
CHLORIDE SERPL-SCNC: 102 MMOL/L — SIGNIFICANT CHANGE UP (ref 96–108)
CO2 SERPL-SCNC: 23 MMOL/L — SIGNIFICANT CHANGE UP (ref 22–29)
COLOR SPEC: ABNORMAL
CREAT SERPL-MCNC: 0.64 MG/DL — SIGNIFICANT CHANGE UP (ref 0.5–1.3)
DACRYOCYTES BLD QL SMEAR: SLIGHT — SIGNIFICANT CHANGE UP
DIFF PNL FLD: ABNORMAL
EGFR: 84 ML/MIN/1.73M2 — SIGNIFICANT CHANGE UP
EOSINOPHIL # BLD AUTO: 0 K/UL — SIGNIFICANT CHANGE UP (ref 0–0.5)
EOSINOPHIL NFR BLD AUTO: 0 % — SIGNIFICANT CHANGE UP (ref 0–6)
EPI CELLS # UR: SIGNIFICANT CHANGE UP
GLUCOSE SERPL-MCNC: 111 MG/DL — HIGH (ref 70–99)
GLUCOSE UR QL: NEGATIVE — SIGNIFICANT CHANGE UP
HCT VFR BLD CALC: 42.5 % — SIGNIFICANT CHANGE UP (ref 34.5–45)
HGB BLD-MCNC: 13.8 G/DL — SIGNIFICANT CHANGE UP (ref 11.5–15.5)
HIV 1 & 2 AB SERPL IA.RAPID: SIGNIFICANT CHANGE UP
INR BLD: 1.25 RATIO — HIGH (ref 0.88–1.16)
KETONES UR-MCNC: ABNORMAL
LACTATE BLDV-MCNC: 1.8 MMOL/L — SIGNIFICANT CHANGE UP (ref 0.5–2)
LEUKOCYTE ESTERASE UR-ACNC: NEGATIVE — SIGNIFICANT CHANGE UP
LYMPHOCYTES # BLD AUTO: 0.53 K/UL — LOW (ref 1–3.3)
LYMPHOCYTES # BLD AUTO: 4.3 % — LOW (ref 13–44)
MANUAL SMEAR VERIFICATION: SIGNIFICANT CHANGE UP
MCHC RBC-ENTMCNC: 30.3 PG — SIGNIFICANT CHANGE UP (ref 27–34)
MCHC RBC-ENTMCNC: 32.5 GM/DL — SIGNIFICANT CHANGE UP (ref 32–36)
MCV RBC AUTO: 93.2 FL — SIGNIFICANT CHANGE UP (ref 80–100)
MONOCYTES # BLD AUTO: 1.08 K/UL — HIGH (ref 0–0.9)
MONOCYTES NFR BLD AUTO: 8.7 % — SIGNIFICANT CHANGE UP (ref 2–14)
NEUTROPHILS # BLD AUTO: 10.67 K/UL — HIGH (ref 1.8–7.4)
NEUTROPHILS NFR BLD AUTO: 86.1 % — HIGH (ref 43–77)
NITRITE UR-MCNC: POSITIVE
OVALOCYTES BLD QL SMEAR: SLIGHT — SIGNIFICANT CHANGE UP
PH UR: 6.5 — SIGNIFICANT CHANGE UP (ref 5–8)
PLAT MORPH BLD: NORMAL — SIGNIFICANT CHANGE UP
PLATELET # BLD AUTO: 200 K/UL — SIGNIFICANT CHANGE UP (ref 150–400)
POIKILOCYTOSIS BLD QL AUTO: SLIGHT — SIGNIFICANT CHANGE UP
POLYCHROMASIA BLD QL SMEAR: SLIGHT — SIGNIFICANT CHANGE UP
POTASSIUM SERPL-MCNC: 4.6 MMOL/L — SIGNIFICANT CHANGE UP (ref 3.5–5.3)
POTASSIUM SERPL-SCNC: 4.6 MMOL/L — SIGNIFICANT CHANGE UP (ref 3.5–5.3)
PROT SERPL-MCNC: 7.1 G/DL — SIGNIFICANT CHANGE UP (ref 6.6–8.7)
PROT UR-MCNC: 30 MG/DL
PROTHROM AB SERPL-ACNC: 14.5 SEC — HIGH (ref 10.5–13.4)
RAPID RVP RESULT: DETECTED
RBC # BLD: 4.56 M/UL — SIGNIFICANT CHANGE UP (ref 3.8–5.2)
RBC # FLD: 13.1 % — SIGNIFICANT CHANGE UP (ref 10.3–14.5)
RBC BLD AUTO: ABNORMAL
RBC CASTS # UR COMP ASSIST: SIGNIFICANT CHANGE UP /HPF (ref 0–4)
SARS-COV-2 RNA SPEC QL NAA+PROBE: DETECTED
SODIUM SERPL-SCNC: 136 MMOL/L — SIGNIFICANT CHANGE UP (ref 135–145)
SP GR SPEC: 1.01 — SIGNIFICANT CHANGE UP (ref 1.01–1.02)
TROPONIN T SERPL-MCNC: <0.01 NG/ML — SIGNIFICANT CHANGE UP (ref 0–0.06)
UROBILINOGEN FLD QL: 8
VARIANT LYMPHS # BLD: 0.9 % — SIGNIFICANT CHANGE UP (ref 0–6)
WBC # BLD: 12.39 K/UL — HIGH (ref 3.8–10.5)
WBC # FLD AUTO: 12.39 K/UL — HIGH (ref 3.8–10.5)
WBC UR QL: ABNORMAL /HPF (ref 0–5)

## 2022-11-29 PROCEDURE — 74177 CT ABD & PELVIS W/CONTRAST: CPT | Mod: 26,MF

## 2022-11-29 PROCEDURE — 99223 1ST HOSP IP/OBS HIGH 75: CPT

## 2022-11-29 PROCEDURE — G1004: CPT

## 2022-11-29 PROCEDURE — 99285 EMERGENCY DEPT VISIT HI MDM: CPT | Mod: CS

## 2022-11-29 PROCEDURE — 71045 X-RAY EXAM CHEST 1 VIEW: CPT | Mod: 26

## 2022-11-29 PROCEDURE — 93010 ELECTROCARDIOGRAM REPORT: CPT

## 2022-11-29 PROCEDURE — 99497 ADVNCD CARE PLAN 30 MIN: CPT | Mod: 25

## 2022-11-29 RX ORDER — LANOLIN ALCOHOL/MO/W.PET/CERES
3 CREAM (GRAM) TOPICAL AT BEDTIME
Refills: 0 | Status: DISCONTINUED | OUTPATIENT
Start: 2022-11-29 | End: 2022-12-06

## 2022-11-29 RX ORDER — CEFTRIAXONE 500 MG/1
1000 INJECTION, POWDER, FOR SOLUTION INTRAMUSCULAR; INTRAVENOUS ONCE
Refills: 0 | Status: COMPLETED | OUTPATIENT
Start: 2022-11-29 | End: 2022-11-29

## 2022-11-29 RX ORDER — OXYBUTYNIN CHLORIDE 5 MG
5 TABLET ORAL DAILY
Refills: 0 | Status: DISCONTINUED | OUTPATIENT
Start: 2022-11-29 | End: 2022-11-29

## 2022-11-29 RX ORDER — SIMVASTATIN 20 MG/1
40 TABLET, FILM COATED ORAL AT BEDTIME
Refills: 0 | Status: DISCONTINUED | OUTPATIENT
Start: 2022-11-29 | End: 2022-12-06

## 2022-11-29 RX ORDER — FOLIC ACID 0.8 MG
1 TABLET ORAL DAILY
Refills: 0 | Status: DISCONTINUED | OUTPATIENT
Start: 2022-11-29 | End: 2022-12-06

## 2022-11-29 RX ORDER — FAMOTIDINE 10 MG/ML
20 INJECTION INTRAVENOUS ONCE
Refills: 0 | Status: COMPLETED | OUTPATIENT
Start: 2022-11-29 | End: 2022-11-29

## 2022-11-29 RX ORDER — SODIUM CHLORIDE 9 MG/ML
2100 INJECTION, SOLUTION INTRAVENOUS ONCE
Refills: 0 | Status: COMPLETED | OUTPATIENT
Start: 2022-11-29 | End: 2022-11-29

## 2022-11-29 RX ORDER — SIMVASTATIN 20 MG/1
1 TABLET, FILM COATED ORAL
Qty: 0 | Refills: 0 | DISCHARGE

## 2022-11-29 RX ORDER — LEVOTHYROXINE SODIUM 125 MCG
100 TABLET ORAL DAILY
Refills: 0 | Status: DISCONTINUED | OUTPATIENT
Start: 2022-11-29 | End: 2022-12-06

## 2022-11-29 RX ORDER — ENOXAPARIN SODIUM 100 MG/ML
40 INJECTION SUBCUTANEOUS EVERY 24 HOURS
Refills: 0 | Status: DISCONTINUED | OUTPATIENT
Start: 2022-11-29 | End: 2022-12-06

## 2022-11-29 RX ORDER — CEFTRIAXONE 500 MG/1
1000 INJECTION, POWDER, FOR SOLUTION INTRAMUSCULAR; INTRAVENOUS ONCE
Refills: 0 | Status: DISCONTINUED | OUTPATIENT
Start: 2022-11-29 | End: 2022-11-29

## 2022-11-29 RX ORDER — ONDANSETRON 8 MG/1
4 TABLET, FILM COATED ORAL EVERY 8 HOURS
Refills: 0 | Status: DISCONTINUED | OUTPATIENT
Start: 2022-11-29 | End: 2022-12-06

## 2022-11-29 RX ORDER — OXYBUTYNIN CHLORIDE 5 MG
5 TABLET ORAL DAILY
Refills: 0 | Status: DISCONTINUED | OUTPATIENT
Start: 2022-11-29 | End: 2022-12-06

## 2022-11-29 RX ORDER — LATANOPROST 0.05 MG/ML
1 SOLUTION/ DROPS OPHTHALMIC; TOPICAL AT BEDTIME
Refills: 0 | Status: DISCONTINUED | OUTPATIENT
Start: 2022-11-29 | End: 2022-12-06

## 2022-11-29 RX ORDER — CEFTRIAXONE 500 MG/1
1000 INJECTION, POWDER, FOR SOLUTION INTRAMUSCULAR; INTRAVENOUS EVERY 24 HOURS
Refills: 0 | Status: COMPLETED | OUTPATIENT
Start: 2022-11-29 | End: 2022-12-02

## 2022-11-29 RX ORDER — ASPIRIN/CALCIUM CARB/MAGNESIUM 324 MG
81 TABLET ORAL DAILY
Refills: 0 | Status: DISCONTINUED | OUTPATIENT
Start: 2022-11-29 | End: 2022-12-06

## 2022-11-29 RX ADMIN — Medication 5 MILLIGRAM(S): at 23:48

## 2022-11-29 RX ADMIN — CEFTRIAXONE 1000 MILLIGRAM(S): 500 INJECTION, POWDER, FOR SOLUTION INTRAMUSCULAR; INTRAVENOUS at 12:39

## 2022-11-29 RX ADMIN — SODIUM CHLORIDE 2100 MILLILITER(S): 9 INJECTION, SOLUTION INTRAVENOUS at 13:33

## 2022-11-29 RX ADMIN — LATANOPROST 1 DROP(S): 0.05 SOLUTION/ DROPS OPHTHALMIC; TOPICAL at 23:48

## 2022-11-29 RX ADMIN — SODIUM CHLORIDE 2100 MILLILITER(S): 9 INJECTION, SOLUTION INTRAVENOUS at 12:39

## 2022-11-29 RX ADMIN — FAMOTIDINE 20 MILLIGRAM(S): 10 INJECTION INTRAVENOUS at 17:43

## 2022-11-29 RX ADMIN — SIMVASTATIN 40 MILLIGRAM(S): 20 TABLET, FILM COATED ORAL at 23:48

## 2022-11-29 NOTE — ED ADULT NURSE NOTE - OBJECTIVE STATEMENT
Pt presents with weakness urinary odor and frequency. reports shes been unable to walk due to being weak. on po abx with no improvement, aao x4.

## 2022-11-29 NOTE — H&P ADULT - NSHPLABSRESULTS_GEN_ALL_CORE
CARDIAC MARKERS ( 2022 11:50 )  x     / <0.01 ng/mL / x     / x     / x                                13.8   12.39 )-----------( 200      ( 2022 1,1:50 )             42.5     2022 11:50    136    |  102    |  17.5   ----------------------------<  111    4.6     |  23.0   |  0.64     Ca    9.0        2022 11:50    TPro  7.1    /  Alb  3.8    /  TBili  0.6    /  DBili  x      /  AST  27     /  ALT  18     /  AlkPhos  72     2022 11:50    PT/INR - ( 2022 11:50 )   PT: 14.5 sec;   INR: 1.25 ratio         PTT - ( 2022 11:50 )  PTT:28.3 sec  CAPILLARY BLOOD GLUCOSE        LIVER FUNCTIONS - ( 2022 11:50 )  Alb: 3.8 g/dL / Pro: 7.1 g/dL / ALK PHOS: 72 U/L / ALT: 18 U/L / AST: 27 U/L / GGT: x           Urinalysis Basic - ( 2022 11:50 )    Color: Parvin / Appearance: Clear / S.015 / pH: x  Gluc: x / Ketone: Moderate  / Bili: Large / Urobili: 8   Blood: x / Protein: 30 mg/dL / Nitrite: Positive   Leuk Esterase: Negative / RBC: 0-2 /HPF / WBC 6-10 /HPF   Sq Epi: x / Non Sq Epi: Few / Bacteria: Moderate      < from: CT Abdomen and Pelvis w/ IV Cont (22 @ 20:52) >      IMPRESSION:  Stable appearing intra and extra hepatic biliary ductal dilatation.   Extrahepaticcommon duct measures up to 1.0 mm. No obstructing stone.   Enhancing periampullary region is again identified measuring 1.2 x 1.5   cm. No active bleeding.    Little change in the vascular lesion left upper quadrant within the small   bowel measuring2.8 x 1.1 cm axial 3-57/coronal 5-79. Similar additional   lesions within the small bowel visualized within the right upper quadrant   axial 3-64/coronal 5-74 measuring 2.6 x 1.5 cm. This right-sided lesion   not clearly seen on the prior exam. No active bleeding. 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    Mild dilatation of fluid-filled small bowel within central and lower   abdomen. Possible partial or incomplete obstruction of the small bowel   until proven otherwise. However, no high-grade obstruction present.   Finding may be related to the right-sided lesion described above. Further   evaluation and follow-up as clinically indicated at this time.    No hydronephrosis. No renal or ureteral stones. Scattered small bilateral   renal cysts. Questionable very mild heterogeneity of the renal parenchyma   lower pole right kidney may be present but no other evidence of jamila   pyelonephritis. No renal or perirenal abscess. No perinephric fluid   collections.    Bladder diverticula again seen. No enhancing masses. No CT evidence of   cystitis.    VERTEBRAL BODY ANALYSIS: No Vertebral fracture or low bone density   identified.    --- End of Report ---            < end of copied text >    < from: Xray Chest 1 View- PORTABLE-Urgent (22 @ 12:18) >      IMPRESSION: No acute finding.    < end of copied text >

## 2022-11-29 NOTE — ED PROVIDER NOTE - NS ED ROS FT
Constitutional: (-) fever  (+)chills  (+sweats  Eyes/ENT: (-)   Cardiovascular: (-) chest pain, (-) palpitations (-) edema   Respiratory: (-) cough, (-) shortness of breath   Gastrointestinal: (+)nausea  (-)vomiting, (-) diarrhea  (+) abdominal pain   :  (+)dysuria, (+)frequency, (-)urgency, (-)hematuria  Musculoskeletal: (-) neck pain, (-) back pain, (-) joint pain  Integumentary: (-) rash, (-) edema  Neurological: (-) headache, (-) altered mental status  (-)LOC

## 2022-11-29 NOTE — ED ADULT NURSE REASSESSMENT NOTE - NS ED NURSE REASSESS COMMENT FT1
received report from day RN, assumed care of pt at change of shift.  pt is AOX4, admitted for UTI, pt is COVID+.  reports dysuria, incontinence, urinary frequency, cough.  denies abd pain, nausea, vomiting, shortness of breath, chest pain, palpitations, nasal congestion, chills.  no s/s acute distress noted.  pt TBA for uti

## 2022-11-29 NOTE — H&P ADULT - HISTORY OF PRESENT ILLNESS
88 yo female with pmhx HTN, HLD, Glaucoma presenting to the ED for weakness, dysuria, and frequency x 5 days. Daughter reports urine has been foul smelling over this time. Patient became increasingly weak, to the point she was unable to stand this morning. She went to urgent care and was found to be hypotensive and sent to the ED. 88 yo female with pmhx HTN, HLD, Glaucoma presenting to the ED for weakness, dysuria, and frequency x 5 days. Daughter-in-law reports urine has been foul smelling over this time, but patient states she hasn't noticed. Patient became increasingly weak, to the point she was unable to stand this morning. She went to urgent care and was found to be hypotensive and sent to the ED. She was initially given Macrobid outpatient, but only took one dose prior to going to urgent care and being sent in. She feels well now, no headache, dizziness, chest pain, SOB, N/V/D. She reports some abdominal cramping earlier that has resolved.

## 2022-11-29 NOTE — H&P ADULT - ASSESSMENT
89 yo female with pmhx HTN, HLD, Glaucoma presenting to the ED for weakness, dysuria, and frequency x 5 days.    Weakness 2/2 Acute UTI; Sepsis   -Admit to medicine  -SIRS criteria met: T 101.6, HR 94, WBC 12K  -CXR negative   -UA positive for WBC, Bacteria, Nitrites with + symptoms  -Lactate 1.8  -Sepsis protocol initiated in ED  -Continue Ceftriaxone 1 gm daily  -Follow up Blood and Urine cultures and tailor antibiotics accordingly  -Monitor CBC, BMP    COVID Viral Infection  -May also be contributing to weakness  -Not hypoxic, so no role for Remdesivir/steroids   -Supportive care  -Isolation precautions    HTN, HLD  -Continue Simvastatin and Aspirin   -Can hold Amlodipine 2.5 mg daily for now, BP stable  -Monitor blood pressures    Hypothyroidism  -Continue Levothyroxine 100 mcg daily    Glaucoma      DVTppx: Lovenox  GOC: 89 yo female with pmhx HTN, HLD, Glaucoma presenting to the ED for weakness, dysuria, and frequency x 5 days.    Weakness 2/2 Acute UTI; Sepsis   -Admit to medicine  -SIRS criteria met: T 101.6, HR 94, WBC 12K  -CXR negative   -UA positive for WBC, Bacteria, Nitrites with + symptoms  -Lactate 1.8  -Sepsis protocol initiated in ED  -Continue Ceftriaxone 1 gm daily  -Follow up Blood and Urine cultures and tailor antibiotics accordingly  -Monitor CBC, BMP    COVID Viral Infection  -May also be contributing to weakness  -Not hypoxic, so no role for Remdesivir/steroids   -Supportive care  -Isolation precautions    HTN, HLD  -Continue Simvastatin and Aspirin   -Can hold Amlodipine 2.5 mg daily for now, BP stable  -Monitor blood pressures    Hypothyroidism  -Continue Levothyroxine 100 mcg daily    Glaucoma  -Continue Latanoprost eye drops    DVTppx: Lovenox  GOC: Full Code, conversation as documented above

## 2022-11-29 NOTE — ED PROVIDER NOTE - PHYSICAL EXAMINATION
General:     NAD  Head:     NC/AT, EOMI, oral mucosa moist  Neck:     trachea midline  Lungs:   trace bibasilar crackles  CVS:     S1S2, RRR, no m/g/r  Abd:     +BS, s/nt/nd, no organomegaly  +left CVAT  Ext:    2+ radial and pedal pulses, no c/c/e  Neuro: AAOx3, no sensory/motor deficits

## 2022-11-29 NOTE — ED ADULT TRIAGE NOTE - CHIEF COMPLAINT QUOTE
pt c/o UTI symptoms x1 week, saw her PCP yesterday who started her on an antibiotic, today began having cough, congestion and generalized weakness, unable to walk due to weakness.

## 2022-11-29 NOTE — H&P ADULT - CONVERSATION DETAILS
Introduced the topic, patient states she has never formerly discussed goals of care. Discussed the treatment options in the event that patient were to go into cardiac arrest including chest compressions and intubation. Explain in detail what this entails. Patient would like all measures to be taken. Verbalized understanding and consent.

## 2022-11-29 NOTE — H&P ADULT - NSICDXFAMILYHX_GEN_ALL_CORE_FT
FAMILY HISTORY:  No pertinent family history in first degree relatives     FAMILY HISTORY:  Mother  Still living? Unknown  FH: liver cancer, Age at diagnosis: Age Unknown

## 2022-11-29 NOTE — ED PROVIDER NOTE - OBJECTIVE STATEMENT
89yoF; with PMH signif for HTN, HLD, Glaucoma; now p/w generalized weakness. patient with dysuria and frequency over past 5 days with foul smelling urine, as per daughter. patient unable to stand this morning and found to be hypotensive at urgent care.  c/o chills. c/o nausea. c/o left flank pain. denies abd pain. c/o cough x1 days. denies cp/sob.  PMH: HTN, HLD, Glaucoma  SOCIAL: denies smoking / denies illicit substance use /

## 2022-11-29 NOTE — H&P ADULT - NSHPPHYSICALEXAM_GEN_ALL_CORE
Vital Signs Last 24 Hrs  T(C): 36.9 (29 Nov 2022 15:25), Max: 38.7 (29 Nov 2022 12:21)  T(F): 98.5 (29 Nov 2022 15:25), Max: 101.6 (29 Nov 2022 12:21)  HR: 84 (29 Nov 2022 15:25) (74 - 94)  BP: 121/74 (29 Nov 2022 15:25) (97/54 - 121/74)  BP(mean): --  RR: 16 (29 Nov 2022 12:17) (16 - 16)  SpO2: 95% (29 Nov 2022 15:25) (94% - 99%)    Parameters below as of 29 Nov 2022 18:13  Patient On (Oxygen Delivery Method): room air    General: Age-appearing, in no acute distress  Head: Normochephalic, atraumatic  ENMT: EOMI, neck supple  Cardiovascular: +S1, S2; Regular rate and rhythm, no murmurs, rubs, gallops  Respiratory: CTA BL, no wheezes, rales, rhonchi  Gastrointestinal: Abdomen soft, non-tender, +BS in all 4 quadrants  Extremities: No clubbing, cyanosis, or edema  Vascular: 2+ pulses, cap refill < 2 seconds  Neuro: Non-focal, AAOx4, sensation intact BL  Musculoskeletal: Normal tone, no deformities  Skin: Warm, dry; no acute rash seen  Psych: Appropriate, cooperative Vital Signs Last 24 Hrs  T(C): 36.9 (29 Nov 2022 15:25), Max: 38.7 (29 Nov 2022 12:21)  T(F): 98.5 (29 Nov 2022 15:25), Max: 101.6 (29 Nov 2022 12:21)  HR: 84 (29 Nov 2022 15:25) (74 - 94)  BP: 121/74 (29 Nov 2022 15:25) (97/54 - 121/74)  BP(mean): --  RR: 16 (29 Nov 2022 12:17) (16 - 16)  SpO2: 95% (29 Nov 2022 15:25) (94% - 99%)    Parameters below as of 29 Nov 2022 18:13  Patient On (Oxygen Delivery Method): room air    General: Age-appearing, in no acute distress  Head: Normocephalic, atraumatic  ENMT: EOMI, neck supple  Cardiovascular: +S1, S2; Regular rate and rhythm, no murmurs, rubs, gallops  Respiratory: CTA BL, no wheezes, rales, rhonchi  Gastrointestinal: Abdomen soft, non-tender, +BS in all 4 quadrants  : Suprapubic tenderness to palpation  Extremities: No clubbing, cyanosis, or edema  Vascular: 2+ pulses, cap refill < 2 seconds  Neuro: Non-focal, AAOx4, sensation intact BL  Musculoskeletal: Normal tone, no deformities  Skin: Warm, dry; no acute rash seen  Psych: Appropriate, cooperative

## 2022-11-29 NOTE — ED PROVIDER NOTE - CARE PLAN
Principal Discharge DX:	UTI (urinary tract infection)  Secondary Diagnosis:	Sepsis  Secondary Diagnosis:	2019 novel coronavirus disease (COVID-19)   1

## 2022-11-29 NOTE — H&P ADULT - NSICDXPASTMEDICALHX_GEN_ALL_CORE_FT
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

## 2022-11-29 NOTE — H&P ADULT - NSCORESITESY/N_GEN_A_CORE_RD
Physical Therapy Daily Treatment     Visit Count: 2  Plan of Care Dates: Initial: 1/16/2018 Through: 2/27/2018     Insurance Information: Work Injury Information:   Current Employer: [employer]    : MARGARITA CALDERA   Restrictions: Limit lifting and carrying to 15 lbs total.  Limit lifting 2 lbs with left arm.  No overhead work with left arm.  Try to keep elbow near body.   Full Duty Work Demands: medium (20 - 50 lbs), standing >50% of the day, lifting overhead, chest height lifting and lifting from floor   Current Work Status: full time occupation:  at [employer] COMP INFORMATION:  WORK COMP CARRIER: [employer]    CARRIER PHONE:  [phone]  :  MARGARITA Leone   PHONE:  [phone]  DIAGNOSIS:  Neck / arm   DATE OF INJURY:   1/8/2018   CLAIM #:  [id number]  STATUS:  OPEN  maria teresa     Next Referring Provider Visit: 1/29/18     Referred by: Pablo Caceres MD  Medical Diagnosis (from order):    Strain of neck muscle, subsequent encounter [S16.1XXD]  - Primary       Left arm pain [M79.602]          Treatment Diagnosis: Cervical Symptoms with Pain, Impaired Range of Motion, Impaired Motor Function/Muscle Performance, Radiating Pain and Impaired Motor Function and Sensory Integration  Insurance: 1. WC-[employer]AVELERS  2. N/A     Date of onset/injury: 1/8/18     Diagnosis Precautions: none  Chart reviewed: Relevant co-morbidities, allergies, tests and medications: Mitral valve stenosis with heart cath 2001       SUBJECTIVE   Has a headache and feels it might have been from the manual traction.  Has had some dry needling in the past with some relief.  Going to get prescription filled today for mm relaxer.  Current Pain: 9/10.    Functional Change: none    OBJECTIVE   Posture/Observation:  Patient is R handed  Very guarded with cervical ROM, lifting L UE  Demo pain behaviors with dressing     Range of Motion (degrees)          Cervical                     Date Norm Initial    Flexion 80-90 32   Extension 70 17   Lateral Flexion Left  20-45 32   Lateral Flexion Right  20-45 22   Rotation Left  70-90 40   Rotation Right  70-90 44   standard testing positions unless otherwise noted; Key: ranges are reported in active range of motion unless noted as AA=active assistive or P=passive range of motion, * denotes pain   Comments: pain with movement, does have pulling and sensations down the L arm    Range of Motion (degrees) Norm Left Right   Shoulder                    Date   Initial Initial   Flexion 170-180 40*     Extension 50-60 43*     Abduction 170-180 50*     Adduction 50-75 NT     Internal Rotation   L1/2*     External  Rotation 80-90 45 at side*     standard testing positions unless otherwise noted; Key: ranges are reported in active range of motion unless noted as AA=active assistive or P=passive range of motion, * denotes pain   Comments: Uninvolved extremity motion within functional/normal limits.    Treatment   Manual Therapy:   Stm to occipital area.  Gentle muscle energy completed to upper cervical spine as upper rotated to L.  L first rib is elevated and hypomobile.  Gentle inferior glide completed.  STM also completed to posterior shoulder/levater and upper trap area.         Current Home ProgramSupine chin tuck with 5 second hold x 10 reps, 2-3x/day  Seated chin tuck with 5 second hold x 10 reps, 2-3x/day  Seated L UT stretch with 30 second hold x 3 reps, 2-3x/day  Added 15 degree head nods, SB to L only followed up by 6 more nods.  6 reps each/6x/day.    ASSESSMENT   I held off on traction (mechanical or manual) as she felt this made her more sore.  Warmth noted throughout L side of cervical spine.  She had some relief with dry needling in the past so I put her on the cancellation/wait list for early next week.      Pain after treatment: 6-7/10  Result of above outlined education: Verbalizes understanding, Demonstrates understanding and Needs reinforcement    Goals:        To be obtained by end of this plan of care:  1. Patient independent with modified and progressed home exercise program.  2. Patient will report decreased cervical pain/symptoms to 2-3/10 to aid in looking in blind spot for safe driving, age appropriate activities, sleeping undisturbed through a night, lifting as required, completing self-care tasks/dressing and activity tolerance.   3. Patient will increase involved strength to 4+/5 to aid in lifting as required, completing household tasks and activity tolerance.  4. Patient/Client will be able to lift 30 pounds from floor to waist with proper body mechanics to assist with lifting activities at work.  5. Patient/Client will demonstrate >60 degrees of cervical rotation L and R active range of motion to assist with their ability to drive to/from at work       PLAN   Assess effect of manual therapy on headaches.  Consider dry needling appt to address soft tissue restrictions throughout cervical spine.       THERAPY DAILY BILLING   Primary Insurance:  WC-TRAVELERS  Secondary Insurance: N/A    Evaluation Procedures:  No evaluation codes were used on this date of service    Timed Procedures:  Manual Therapy, 30 minutes  Therapeutic Exercise, 10 minutes    Untimed Procedures:  No untimed codes were used on this date of service    Total Treatment Time: 40 minutes        No

## 2022-11-30 ENCOUNTER — TRANSCRIPTION ENCOUNTER (OUTPATIENT)
Age: 87
End: 2022-11-30

## 2022-11-30 LAB
ANION GAP SERPL CALC-SCNC: 16 MMOL/L — SIGNIFICANT CHANGE UP (ref 5–17)
BASOPHILS # BLD AUTO: 0.04 K/UL — SIGNIFICANT CHANGE UP (ref 0–0.2)
BASOPHILS NFR BLD AUTO: 0.4 % — SIGNIFICANT CHANGE UP (ref 0–2)
BUN SERPL-MCNC: 15.1 MG/DL — SIGNIFICANT CHANGE UP (ref 8–20)
CALCIUM SERPL-MCNC: 9.2 MG/DL — SIGNIFICANT CHANGE UP (ref 8.4–10.5)
CHLORIDE SERPL-SCNC: 100 MMOL/L — SIGNIFICANT CHANGE UP (ref 96–108)
CO2 SERPL-SCNC: 23 MMOL/L — SIGNIFICANT CHANGE UP (ref 22–29)
CREAT SERPL-MCNC: 0.63 MG/DL — SIGNIFICANT CHANGE UP (ref 0.5–1.3)
EGFR: 85 ML/MIN/1.73M2 — SIGNIFICANT CHANGE UP
EOSINOPHIL # BLD AUTO: 0.03 K/UL — SIGNIFICANT CHANGE UP (ref 0–0.5)
EOSINOPHIL NFR BLD AUTO: 0.3 % — SIGNIFICANT CHANGE UP (ref 0–6)
GLUCOSE SERPL-MCNC: 89 MG/DL — SIGNIFICANT CHANGE UP (ref 70–99)
HCT VFR BLD CALC: 43.6 % — SIGNIFICANT CHANGE UP (ref 34.5–45)
HGB BLD-MCNC: 14.1 G/DL — SIGNIFICANT CHANGE UP (ref 11.5–15.5)
IMM GRANULOCYTES NFR BLD AUTO: 0.5 % — SIGNIFICANT CHANGE UP (ref 0–0.9)
LYMPHOCYTES # BLD AUTO: 0.77 K/UL — LOW (ref 1–3.3)
LYMPHOCYTES # BLD AUTO: 7.7 % — LOW (ref 13–44)
MCHC RBC-ENTMCNC: 30.1 PG — SIGNIFICANT CHANGE UP (ref 27–34)
MCHC RBC-ENTMCNC: 32.3 GM/DL — SIGNIFICANT CHANGE UP (ref 32–36)
MCV RBC AUTO: 93.2 FL — SIGNIFICANT CHANGE UP (ref 80–100)
MONOCYTES # BLD AUTO: 1.25 K/UL — HIGH (ref 0–0.9)
MONOCYTES NFR BLD AUTO: 12.6 % — SIGNIFICANT CHANGE UP (ref 2–14)
NEUTROPHILS # BLD AUTO: 7.81 K/UL — HIGH (ref 1.8–7.4)
NEUTROPHILS NFR BLD AUTO: 78.5 % — HIGH (ref 43–77)
PLATELET # BLD AUTO: 193 K/UL — SIGNIFICANT CHANGE UP (ref 150–400)
POTASSIUM SERPL-MCNC: 4.3 MMOL/L — SIGNIFICANT CHANGE UP (ref 3.5–5.3)
POTASSIUM SERPL-SCNC: 4.3 MMOL/L — SIGNIFICANT CHANGE UP (ref 3.5–5.3)
RBC # BLD: 4.68 M/UL — SIGNIFICANT CHANGE UP (ref 3.8–5.2)
RBC # FLD: 13.1 % — SIGNIFICANT CHANGE UP (ref 10.3–14.5)
SODIUM SERPL-SCNC: 139 MMOL/L — SIGNIFICANT CHANGE UP (ref 135–145)
WBC # BLD: 9.95 K/UL — SIGNIFICANT CHANGE UP (ref 3.8–10.5)
WBC # FLD AUTO: 9.95 K/UL — SIGNIFICANT CHANGE UP (ref 3.8–10.5)

## 2022-11-30 PROCEDURE — 99233 SBSQ HOSP IP/OBS HIGH 50: CPT

## 2022-11-30 RX ORDER — METOCLOPRAMIDE HCL 10 MG
10 TABLET ORAL ONCE
Refills: 0 | Status: COMPLETED | OUTPATIENT
Start: 2022-11-30 | End: 2022-11-30

## 2022-11-30 RX ADMIN — Medication 1 MILLIGRAM(S): at 12:01

## 2022-11-30 RX ADMIN — CEFTRIAXONE 1000 MILLIGRAM(S): 500 INJECTION, POWDER, FOR SOLUTION INTRAMUSCULAR; INTRAVENOUS at 12:01

## 2022-11-30 RX ADMIN — Medication 10 MILLIGRAM(S): at 06:05

## 2022-11-30 RX ADMIN — Medication 5 MILLIGRAM(S): at 16:22

## 2022-11-30 RX ADMIN — Medication 100 MICROGRAM(S): at 06:35

## 2022-11-30 RX ADMIN — ENOXAPARIN SODIUM 40 MILLIGRAM(S): 100 INJECTION SUBCUTANEOUS at 06:35

## 2022-11-30 RX ADMIN — ONDANSETRON 4 MILLIGRAM(S): 8 TABLET, FILM COATED ORAL at 13:12

## 2022-11-30 RX ADMIN — Medication 81 MILLIGRAM(S): at 12:01

## 2022-11-30 RX ADMIN — ONDANSETRON 4 MILLIGRAM(S): 8 TABLET, FILM COATED ORAL at 04:10

## 2022-11-30 NOTE — PROGRESS NOTE ADULT - ASSESSMENT
89 yo female with pmhx HTN, HLD, Glaucoma presenting to the ED for weakness, dysuria, and frequency x 5 days.    #Weakness 2/2 Acute UTI; Sepsis   -SIRS criteria met: T 101.6, HR 94, WBC 12K  -UA positive for WBC, Bacteria, Nitrites with + symptoms - culture pending  -Continue Ceftriaxone 1 gm daily    #COVID Viral Infection  - May also be contributing to weakness  - Not hypoxic, so no role for Remdesivir/steroids   - Supportive care  - Isolation precautions    #HTN, HLD  - Continue Simvastatin and Aspirin   - Can hold Amlodipine 2.5 mg daily for now, BP stable  - Monitor blood pressures    #Hypothyroidism  - Continue Levothyroxine 100 mcg daily    #Glaucoma  - Continue Latanoprost eye drops    DVTppx: Lovenox

## 2022-11-30 NOTE — DISCHARGE NOTE PROVIDER - NSDCMRMEDTOKEN_GEN_ALL_CORE_FT
alendronate weekly:   amLODIPine 2.5 mg oral tablet: 1 tab(s) orally once a day  aspirin 81 mg oral delayed release tablet: 1 tab(s) orally once a day  folic acid:  orally   latanoprost 0.005% ophthalmic solution: 1 drop(s) to each affected eye once a day (at bedtime)  levothyroxine 100 mcg (0.1 mg) oral tablet: 1 tab(s) orally once a day  oxybutynin 5 mg/24 hours oral tablet, extended release: 1 tab(s) orally once a day  simvastatin 40 mg oral tablet: 1 tab(s) orally once a day (at bedtime)   alendronate weekly:   amLODIPine 2.5 mg oral tablet: 1 tab(s) orally once a day  aspirin 81 mg oral delayed release tablet: 1 tab(s) orally once a day  bisacodyl 5 mg oral delayed release tablet: 1 tab(s) orally every 12 hours, As needed, Constipation  folic acid 1 mg oral tablet: 1 tab(s) orally once a day  latanoprost 0.005% ophthalmic solution: 1 drop(s) to each affected eye once a day (at bedtime)  levothyroxine 100 mcg (0.1 mg) oral tablet: 1 tab(s) orally once a day  oxybutynin 5 mg/24 hours oral tablet, extended release: 1 tab(s) orally once a day  senna leaf extract oral tablet: 2 tab(s) orally once a day (at bedtime)  simvastatin 40 mg oral tablet: 1 tab(s) orally once a day (at bedtime)

## 2022-11-30 NOTE — DISCHARGE NOTE PROVIDER - HOSPITAL COURSE
89 yo female with pmhx HTN, HLD, Glaucoma presenting to the ED for weakness, dysuria, and frequency x 5 days; admitted with acute UTI w sepsis/SIRS. Patient started on Ceftriaxone for UTI pending urine and blood cultures.  She was also noted to be positive for COVID on admission, no respiratory sx or hypoxia noted and no remdesivir or corticosteroids administered.            88 yo female with pmhx HTN, HLD, Glaucoma presenting to the ED for weakness, dysuria, and frequency x 5 days; admitted with acute UTI w sepsis/SIRS. Patient started on Ceftriaxone for UTI pending urine and blood cultures.  She was also noted to be positive for COVID on admission, no respiratory sx or hypoxia noted and no remdesivir or corticosteroids administered.     Patient culture showed  Morganella morganii sensitive to most abx. Will discharge patient home on oral abx. Patient ambulates with standby assist and tolerating PO diet, having BM. without acute issues. Medically ready for discharge at this time. 89 yo female with pmhx HTN, HLD, Glaucoma presenting to the ED for weakness, dysuria, and frequency x 5 days; admitted with acute UTI w sepsis/SIRS. Patient started on Ceftriaxone for UTI pending urine and blood cultures.  She was also noted to be positive for COVID on admission, no respiratory sx or hypoxia noted and no remdesivir or corticosteroids administered. Patient culture showed  Morganella morganii sensitive to most abx. Will discharge patient home on oral abx. Patient ambulates with standby assist and tolerating PO diet, having BM. without acute issues. Medically ready for discharge at this time. 89 yr old female with hypertension, hyperlipidemia, hypothyroidism, glaucoma presented with complaints of weakness, dysuria for 5 days prior to presentation. She went to an urgent care and was referred to the ED for hypotension. Noted to have a positive UA and sepsis on admission. She was given fluids and IV Ceftriaxone. Cultures were sent. Also tested positive for COVID, but no hypoxia noted. She was placed in isolation. Urine cultures grew Morganella. She improved clinically and was evaluated by PT, advised home PT. CT abdomen noted, suggestive of neurofibromatosis. No clinical signs of obstruction. Tolerating diet, passing flatus. Denied abdominal pain. She is stable for discharge to [facility].

## 2022-11-30 NOTE — DISCHARGE NOTE PROVIDER - NSDCCPCAREPLAN_GEN_ALL_CORE_FT
PRINCIPAL DISCHARGE DIAGNOSIS  Diagnosis: Sepsis  Assessment and Plan of Treatment: Resolved      SECONDARY DISCHARGE DIAGNOSES  Diagnosis: Acute UTI  Assessment and Plan of Treatment:     Diagnosis: 2019 novel coronavirus disease (COVID-19)  Assessment and Plan of Treatment: Asymptomatic, no hypoxia. No treatment given.     PRINCIPAL DISCHARGE DIAGNOSIS  Diagnosis: Sepsis  Assessment and Plan of Treatment: 2/2 Acute UTI from [facility]  continue with vantin on discharge  follow up with primary care provider.      SECONDARY DISCHARGE DIAGNOSES  Diagnosis: 2019 novel coronavirus disease (COVID-19)  Assessment and Plan of Treatment: Asymptomatic, no hypoxia. No treatment given.     PRINCIPAL DISCHARGE DIAGNOSIS  Diagnosis: Sepsis  Assessment and Plan of Treatment: 2/2 Acute UTI from [facility]  continue with vantin on discharge  follow up with primary care provider.      SECONDARY DISCHARGE DIAGNOSES  Diagnosis: 2019 novel coronavirus disease (COVID-19)  Assessment and Plan of Treatment: Asymptomatic, no hypoxia. No treatment given.    Diagnosis: Hypothyroidism  Assessment and Plan of Treatment: Continue synthroid

## 2022-11-30 NOTE — DISCHARGE NOTE PROVIDER - NSDCFUSCHEDAPPT_GEN_ALL_CORE_FT
Bina Ritter  [facility]  [facility] [address]  Scheduled Appointment: 12/16/2022    Dexter Rankin  [facility]  [facility] [address]  Scheduled Appointment: 12/23/2022

## 2022-12-01 LAB
-  AMIKACIN: SIGNIFICANT CHANGE UP
-  AMOXICILLIN/CLAVULANIC ACID: SIGNIFICANT CHANGE UP
-  AMPICILLIN/SULBACTAM: SIGNIFICANT CHANGE UP
-  AMPICILLIN: SIGNIFICANT CHANGE UP
-  AZTREONAM: SIGNIFICANT CHANGE UP
-  CEFAZOLIN: SIGNIFICANT CHANGE UP
-  CEFEPIME: SIGNIFICANT CHANGE UP
-  CEFOXITIN: SIGNIFICANT CHANGE UP
-  CEFTRIAXONE: SIGNIFICANT CHANGE UP
-  CIPROFLOXACIN: SIGNIFICANT CHANGE UP
-  ERTAPENEM: SIGNIFICANT CHANGE UP
-  GENTAMICIN: SIGNIFICANT CHANGE UP
-  LEVOFLOXACIN: SIGNIFICANT CHANGE UP
-  MEROPENEM: SIGNIFICANT CHANGE UP
-  NITROFURANTOIN: SIGNIFICANT CHANGE UP
-  PIPERACILLIN/TAZOBACTAM: SIGNIFICANT CHANGE UP
-  TOBRAMYCIN: SIGNIFICANT CHANGE UP
-  TRIMETHOPRIM/SULFAMETHOXAZOLE: SIGNIFICANT CHANGE UP
CULTURE RESULTS: SIGNIFICANT CHANGE UP
METHOD TYPE: SIGNIFICANT CHANGE UP
ORGANISM # SPEC MICROSCOPIC CNT: SIGNIFICANT CHANGE UP
ORGANISM # SPEC MICROSCOPIC CNT: SIGNIFICANT CHANGE UP
SPECIMEN SOURCE: SIGNIFICANT CHANGE UP

## 2022-12-01 PROCEDURE — 99233 SBSQ HOSP IP/OBS HIGH 50: CPT

## 2022-12-01 RX ORDER — INFLUENZA VIRUS VACCINE 15; 15; 15; 15 UG/.5ML; UG/.5ML; UG/.5ML; UG/.5ML
0.7 SUSPENSION INTRAMUSCULAR ONCE
Refills: 0 | Status: COMPLETED | OUTPATIENT
Start: 2022-12-01 | End: 2022-12-01

## 2022-12-01 RX ADMIN — LATANOPROST 1 DROP(S): 0.05 SOLUTION/ DROPS OPHTHALMIC; TOPICAL at 00:16

## 2022-12-01 RX ADMIN — Medication 81 MILLIGRAM(S): at 13:32

## 2022-12-01 RX ADMIN — Medication 5 MILLIGRAM(S): at 13:32

## 2022-12-01 RX ADMIN — ENOXAPARIN SODIUM 40 MILLIGRAM(S): 100 INJECTION SUBCUTANEOUS at 07:35

## 2022-12-01 RX ADMIN — Medication 100 MICROGRAM(S): at 07:35

## 2022-12-01 RX ADMIN — SIMVASTATIN 40 MILLIGRAM(S): 20 TABLET, FILM COATED ORAL at 00:16

## 2022-12-01 RX ADMIN — Medication 1 MILLIGRAM(S): at 13:32

## 2022-12-01 RX ADMIN — CEFTRIAXONE 1000 MILLIGRAM(S): 500 INJECTION, POWDER, FOR SOLUTION INTRAMUSCULAR; INTRAVENOUS at 13:33

## 2022-12-01 NOTE — PROGRESS NOTE ADULT - ASSESSMENT
89 yo female with pmhx HTN, HLD, Glaucoma presenting to the ED for weakness, dysuria, and frequency x 5 days.    #Weakness 2/2 Acute UTI; Sepsis   - SIRS criteria met: T 101.6, HR 94, WBC 12K  - UA positive for WBC, Bacteria, Nitrites with + symptoms - culture shows Morganella morganii  - CTX daily  - awaiting sensitivities    #COVID Viral Infection  - May also be contributing to weakness  - Not hypoxic, so no role for Remdesivir/steroids   - Supportive care  - Isolation precautions    #HTN, HLD  - Continue Simvastatin and Aspirin   - Can hold Amlodipine 2.5 mg daily for now, BP stable  - Monitor blood pressures    #Hypothyroidism  - Continue Levothyroxine 100 mcg daily    #Glaucoma  - Continue Latanoprost eye drops    DVTppx: Lovenox    DISPO: awaitign sensitivities; needs PT eval; once completed and cultures finalized can discharge

## 2022-12-01 NOTE — PATIENT PROFILE ADULT - FALL HARM RISK - HARM RISK INTERVENTIONS

## 2022-12-02 PROCEDURE — 99232 SBSQ HOSP IP/OBS MODERATE 35: CPT

## 2022-12-02 RX ORDER — CEFPODOXIME PROXETIL 100 MG
1 TABLET ORAL
Qty: 10 | Refills: 0
Start: 2022-12-02 | End: 2022-12-06

## 2022-12-02 RX ADMIN — Medication 100 MICROGRAM(S): at 06:52

## 2022-12-02 RX ADMIN — Medication 81 MILLIGRAM(S): at 12:53

## 2022-12-02 RX ADMIN — SIMVASTATIN 40 MILLIGRAM(S): 20 TABLET, FILM COATED ORAL at 00:38

## 2022-12-02 RX ADMIN — LATANOPROST 1 DROP(S): 0.05 SOLUTION/ DROPS OPHTHALMIC; TOPICAL at 22:24

## 2022-12-02 RX ADMIN — ENOXAPARIN SODIUM 40 MILLIGRAM(S): 100 INJECTION SUBCUTANEOUS at 06:52

## 2022-12-02 RX ADMIN — LATANOPROST 1 DROP(S): 0.05 SOLUTION/ DROPS OPHTHALMIC; TOPICAL at 00:38

## 2022-12-02 RX ADMIN — SIMVASTATIN 40 MILLIGRAM(S): 20 TABLET, FILM COATED ORAL at 22:24

## 2022-12-02 RX ADMIN — CEFTRIAXONE 1000 MILLIGRAM(S): 500 INJECTION, POWDER, FOR SOLUTION INTRAMUSCULAR; INTRAVENOUS at 12:53

## 2022-12-02 RX ADMIN — Medication 1 MILLIGRAM(S): at 12:53

## 2022-12-02 RX ADMIN — Medication 5 MILLIGRAM(S): at 12:53

## 2022-12-02 NOTE — PHYSICAL THERAPY INITIAL EVALUATION ADULT - PERTINENT HX OF CURRENT PROBLEM, REHAB EVAL
88 yo female with pmhx HTN, HLD, Glaucoma presenting to the ED for weakness, dysuria, and frequency x 5 days.

## 2022-12-02 NOTE — PHYSICAL THERAPY INITIAL EVALUATION ADULT - ADDITIONAL COMMENTS
pt states she lives with her son and daughter in law in a 1-story house with a ramped entrance. pt amb with a RW. has HHA 5 hours/day, 5 days/week to help with dressing and bathing.

## 2022-12-02 NOTE — PROGRESS NOTE ADULT - ASSESSMENT
89 yo female with pmhx HTN, HLD, Glaucoma presenting to the ED for weakness, dysuria, and frequency x 5 days.    #Weakness 2/2 Acute UTI; Sepsis   - SIRS criteria met: T 101.6, HR 94, WBC 12K  - UA positive for WBC, Bacteria, Nitrites with + symptoms - culture shows Morganella morganii  - CTX daily  - sensitivities show sensitive to 3rd gen cephalosporins    #COVID Viral Infection  - May also be contributing to weakness  - Not hypoxic, so no role for Remdesivir/steroids  - Supportive care  - Isolation precautions    #HTN, HLD  - Continue Simvastatin and Aspirin   - Can hold Amlodipine 2.5 mg daily for now, BP stable  - Monitor blood pressures    #Hypothyroidism  - Continue Levothyroxine 100 mcg daily    #Glaucoma  - Continue Latanoprost eye drops    DVTppx: Lovenox    DISPO: patient has aids that cant come to the home due to COVID+; She may be too independent for RODRIGO; CM/SW to continue discharge planning 89 yo female with pmhx HTN, HLD, Glaucoma presenting to the ED for weakness, dysuria, and frequency x 5 days.    #Weakness 2/2 Acute UTI; Sepsis   - SIRS criteria met: T 101.6, HR 94, WBC 12K  - UA positive for WBC, Bacteria, Nitrites with + symptoms - culture shows Morganella morganii  - CTX daily  - sensitivities show sensitive to 3rd gen cephalosporins    #COVID Viral Infection  - May also be contributing to weakness  - Not hypoxic, so no role for Remdesivir/steroids  - Supportive care  - Isolation precautions    #HTN, HLD  - Continue Simvastatin and Aspirin   - Can hold Amlodipine 2.5 mg daily for now, BP stable  - Monitor blood pressures    #Hypothyroidism  - Continue Levothyroxine 100 mcg daily    #Glaucoma  - Continue Latanoprost eye drops    DVTppx: Lovenox    DISPO: patient has aids that cant come to the home due to COVID+; She may be too independent for R[facility]IG[facility]; CM/SW to continue discharge planning    **discussed with patient's son Brendon that patient is medically ready however CM trying to resolve a safe discharge. All questions answered satisfactorily.

## 2022-12-03 PROCEDURE — 99232 SBSQ HOSP IP/OBS MODERATE 35: CPT

## 2022-12-03 RX ADMIN — Medication 30 MILLILITER(S): at 22:03

## 2022-12-03 RX ADMIN — Medication 100 MICROGRAM(S): at 06:19

## 2022-12-03 RX ADMIN — SIMVASTATIN 40 MILLIGRAM(S): 20 TABLET, FILM COATED ORAL at 22:03

## 2022-12-03 RX ADMIN — Medication 5 MILLIGRAM(S): at 11:30

## 2022-12-03 RX ADMIN — Medication 1 MILLIGRAM(S): at 11:30

## 2022-12-03 RX ADMIN — Medication 81 MILLIGRAM(S): at 11:30

## 2022-12-03 RX ADMIN — LATANOPROST 1 DROP(S): 0.05 SOLUTION/ DROPS OPHTHALMIC; TOPICAL at 22:03

## 2022-12-03 RX ADMIN — ENOXAPARIN SODIUM 40 MILLIGRAM(S): 100 INJECTION SUBCUTANEOUS at 06:46

## 2022-12-03 NOTE — PROGRESS NOTE ADULT - ASSESSMENT
89 yr old female with hypertension, hyperlipidemia, hypothyroidism, glaucoma presented with complaints of weakness, dysuria for 5 days prior to presentation. She went to an urgent care and was referred to the ED for hypotension. Noted to have a positive UA and sepsis on admission. She was given fluids and IV Ceftriaxone. Cultures were sent. Also tested positive for COVID, but no hypoxia noted. She was placed in isolation. Urine cultures grew Morganella. She improved clinically and was evaluated by PT, advised home PT.  89 yr old female with hypertension, hyperlipidemia, hypothyroidism, glaucoma presented with complaints of weakness, dysuria for 5 days prior to presentation. She went to an urgent care and was referred to the ED for hypotension. Noted to have a positive UA and sepsis on admission. She was given fluids and IV Ceftriaxone. Cultures were sent. Also tested positive for COVID, but no hypoxia noted. She was placed in isolation. Urine cultures grew Morganella. She improved clinically and was evaluated by PT, advised home PT.     1. UTI:  Completed course of antibiotics.    2. Hyperlipidemia:  Continue statin    3. Hypothyroidism;  Continue Synthroid.    4. DVT ppx:  Lovenox.    Discussed with patient and case management.  Discharge home when aides arranged at home.

## 2022-12-04 PROCEDURE — 99232 SBSQ HOSP IP/OBS MODERATE 35: CPT

## 2022-12-04 RX ORDER — SENNA PLUS 8.6 MG/1
2 TABLET ORAL AT BEDTIME
Refills: 0 | Status: DISCONTINUED | OUTPATIENT
Start: 2022-12-04 | End: 2022-12-06

## 2022-12-04 RX ADMIN — Medication 81 MILLIGRAM(S): at 11:42

## 2022-12-04 RX ADMIN — Medication 5 MILLIGRAM(S): at 13:46

## 2022-12-04 RX ADMIN — Medication 30 MILLILITER(S): at 11:42

## 2022-12-04 RX ADMIN — SIMVASTATIN 40 MILLIGRAM(S): 20 TABLET, FILM COATED ORAL at 22:44

## 2022-12-04 RX ADMIN — LATANOPROST 1 DROP(S): 0.05 SOLUTION/ DROPS OPHTHALMIC; TOPICAL at 22:44

## 2022-12-04 RX ADMIN — Medication 100 MICROGRAM(S): at 05:55

## 2022-12-04 RX ADMIN — Medication 5 MILLIGRAM(S): at 11:42

## 2022-12-04 RX ADMIN — Medication 1 MILLIGRAM(S): at 11:42

## 2022-12-04 RX ADMIN — SENNA PLUS 2 TABLET(S): 8.6 TABLET ORAL at 22:44

## 2022-12-04 RX ADMIN — ENOXAPARIN SODIUM 40 MILLIGRAM(S): 100 INJECTION SUBCUTANEOUS at 05:55

## 2022-12-04 NOTE — PROGRESS NOTE ADULT - ASSESSMENT
89 yr old female with hypertension, hyperlipidemia, hypothyroidism, glaucoma presented with complaints of weakness, dysuria for 5 days prior to presentation. She went to an urgent care and was referred to the ED for hypotension. Noted to have a positive UA and sepsis on admission. She was given fluids and IV Ceftriaxone. Cultures were sent. Also tested positive for COVID, but no hypoxia noted. She was placed in isolation. Urine cultures grew Morganella. She improved clinically and was evaluated by PT, advised home PT.     1. UTI:  Completed course of antibiotics.    2. Hyperlipidemia:  Continue statin    3. Hypothyroidism;  Continue Synthroid.    4. DVT ppx:  Lovenox.    Discussed with patient and case management.  Discharge home when home care arranged.  Called to update son, no answer.   Discharge home when aides arranged at home.

## 2022-12-05 ENCOUNTER — TRANSCRIPTION ENCOUNTER (OUTPATIENT)
Age: 87
End: 2022-12-05

## 2022-12-05 LAB
ANION GAP SERPL CALC-SCNC: 10 MMOL/L — SIGNIFICANT CHANGE UP (ref 5–17)
BASOPHILS # BLD AUTO: 0.05 K/UL — SIGNIFICANT CHANGE UP (ref 0–0.2)
BASOPHILS NFR BLD AUTO: 0.7 % — SIGNIFICANT CHANGE UP (ref 0–2)
BUN SERPL-MCNC: 11 MG/DL — SIGNIFICANT CHANGE UP (ref 8–20)
CALCIUM SERPL-MCNC: 8.4 MG/DL — SIGNIFICANT CHANGE UP (ref 8.4–10.5)
CHLORIDE SERPL-SCNC: 107 MMOL/L — SIGNIFICANT CHANGE UP (ref 96–108)
CO2 SERPL-SCNC: 24 MMOL/L — SIGNIFICANT CHANGE UP (ref 22–29)
CREAT SERPL-MCNC: 0.38 MG/DL — LOW (ref 0.5–1.3)
EGFR: 96 ML/MIN/1.73M2 — SIGNIFICANT CHANGE UP
EOSINOPHIL # BLD AUTO: 0.14 K/UL — SIGNIFICANT CHANGE UP (ref 0–0.5)
EOSINOPHIL NFR BLD AUTO: 2 % — SIGNIFICANT CHANGE UP (ref 0–6)
GLUCOSE SERPL-MCNC: 92 MG/DL — SIGNIFICANT CHANGE UP (ref 70–99)
HCT VFR BLD CALC: 36.5 % — SIGNIFICANT CHANGE UP (ref 34.5–45)
HGB BLD-MCNC: 11.8 G/DL — SIGNIFICANT CHANGE UP (ref 11.5–15.5)
IMM GRANULOCYTES NFR BLD AUTO: 0.9 % — SIGNIFICANT CHANGE UP (ref 0–0.9)
LYMPHOCYTES # BLD AUTO: 1.43 K/UL — SIGNIFICANT CHANGE UP (ref 1–3.3)
LYMPHOCYTES # BLD AUTO: 20.4 % — SIGNIFICANT CHANGE UP (ref 13–44)
MAGNESIUM SERPL-MCNC: 1.9 MG/DL — SIGNIFICANT CHANGE UP (ref 1.6–2.6)
MCHC RBC-ENTMCNC: 30.2 PG — SIGNIFICANT CHANGE UP (ref 27–34)
MCHC RBC-ENTMCNC: 32.3 GM/DL — SIGNIFICANT CHANGE UP (ref 32–36)
MCV RBC AUTO: 93.4 FL — SIGNIFICANT CHANGE UP (ref 80–100)
MONOCYTES # BLD AUTO: 1 K/UL — HIGH (ref 0–0.9)
MONOCYTES NFR BLD AUTO: 14.3 % — HIGH (ref 2–14)
NEUTROPHILS # BLD AUTO: 4.32 K/UL — SIGNIFICANT CHANGE UP (ref 1.8–7.4)
NEUTROPHILS NFR BLD AUTO: 61.7 % — SIGNIFICANT CHANGE UP (ref 43–77)
PHOSPHATE SERPL-MCNC: 2.6 MG/DL — SIGNIFICANT CHANGE UP (ref 2.4–4.7)
PLATELET # BLD AUTO: 220 K/UL — SIGNIFICANT CHANGE UP (ref 150–400)
POTASSIUM SERPL-MCNC: 4 MMOL/L — SIGNIFICANT CHANGE UP (ref 3.5–5.3)
POTASSIUM SERPL-SCNC: 4 MMOL/L — SIGNIFICANT CHANGE UP (ref 3.5–5.3)
RBC # BLD: 3.91 M/UL — SIGNIFICANT CHANGE UP (ref 3.8–5.2)
RBC # FLD: 12.8 % — SIGNIFICANT CHANGE UP (ref 10.3–14.5)
SARS-COV-2 RNA SPEC QL NAA+PROBE: DETECTED
SODIUM SERPL-SCNC: 141 MMOL/L — SIGNIFICANT CHANGE UP (ref 135–145)
WBC # BLD: 7 K/UL — SIGNIFICANT CHANGE UP (ref 3.8–10.5)
WBC # FLD AUTO: 7 K/UL — SIGNIFICANT CHANGE UP (ref 3.8–10.5)

## 2022-12-05 PROCEDURE — 99232 SBSQ HOSP IP/OBS MODERATE 35: CPT

## 2022-12-05 RX ORDER — FOLIC ACID 0.8 MG
1 TABLET ORAL
Qty: 0 | Refills: 0 | DISCHARGE
Start: 2022-12-05

## 2022-12-05 RX ORDER — SENNA PLUS 8.6 MG/1
2 TABLET ORAL
Qty: 0 | Refills: 0 | DISCHARGE
Start: 2022-12-05

## 2022-12-05 RX ORDER — LACTULOSE 10 G/15ML
10 SOLUTION ORAL ONCE
Refills: 0 | Status: COMPLETED | OUTPATIENT
Start: 2022-12-05 | End: 2022-12-05

## 2022-12-05 RX ADMIN — Medication 5 MILLIGRAM(S): at 11:11

## 2022-12-05 RX ADMIN — SIMVASTATIN 40 MILLIGRAM(S): 20 TABLET, FILM COATED ORAL at 22:32

## 2022-12-05 RX ADMIN — LACTULOSE 10 GRAM(S): 10 SOLUTION ORAL at 11:11

## 2022-12-05 RX ADMIN — ENOXAPARIN SODIUM 40 MILLIGRAM(S): 100 INJECTION SUBCUTANEOUS at 06:06

## 2022-12-05 RX ADMIN — Medication 1 MILLIGRAM(S): at 11:11

## 2022-12-05 RX ADMIN — LATANOPROST 1 DROP(S): 0.05 SOLUTION/ DROPS OPHTHALMIC; TOPICAL at 22:32

## 2022-12-05 RX ADMIN — SENNA PLUS 2 TABLET(S): 8.6 TABLET ORAL at 22:32

## 2022-12-05 RX ADMIN — Medication 100 MICROGRAM(S): at 06:06

## 2022-12-05 RX ADMIN — Medication 81 MILLIGRAM(S): at 11:11

## 2022-12-05 NOTE — PROGRESS NOTE ADULT - ASSESSMENT
89 yr old female with hypertension, hyperlipidemia, hypothyroidism, glaucoma presented with complaints of weakness, dysuria for 5 days prior to presentation. She went to an urgent care and was referred to the ED for hypotension. Noted to have a positive UA and sepsis on admission. She was given fluids and IV Ceftriaxone. Cultures were sent. Also tested positive for COVID, but no hypoxia noted. She was placed in isolation. Urine cultures grew Morganella. She improved clinically and was evaluated by PT, advised home PT. CT abdomen noted, suggestive of neurofibromatosis. No clinical signs of obstruction. Tolerating diet, passing flatus. Denied abdominal pain.     1. UTI:  Completed course of antibiotics.    2. Hyperlipidemia:  Continue statin    3. Hypothyroidism;  Continue Synthroid.    4. DVT ppx:  Lovenox.    Discussed with patient and case management.  Discharge planning to [facility] when bed available.  Discharge home when home care arranged.  Called to update son, no answer.   Discharge home when aides arranged at home.

## 2022-12-05 NOTE — DISCHARGE NOTE NURSING/CASE MANAGEMENT/SOCIAL WORK - PATIENT PORTAL LINK FT
You can access the FollowMyHealth Patient Portal offered by [facility] by registering at the following website: [email or web address]. By joining [employer]’s FollowMyHealth portal, you will also be able to view your health information using other applications (apps) compatible with our system.

## 2022-12-05 NOTE — DISCHARGE NOTE NURSING/CASE MANAGEMENT/SOCIAL WORK - NSDCPEFALRISK_GEN_ALL_CORE
For information on Fall & Injury Prevention, visit: [email or web address] OR  [email or web address] OR  https://www.cdc.gov/steadi/patient.html

## 2022-12-06 VITALS
RESPIRATION RATE: 18 BRPM | DIASTOLIC BLOOD PRESSURE: 64 MMHG | OXYGEN SATURATION: 93 % | HEART RATE: 87 BPM | SYSTOLIC BLOOD PRESSURE: 92 MMHG | TEMPERATURE: 98 F

## 2022-12-06 PROCEDURE — 93005 ELECTROCARDIOGRAM TRACING: CPT

## 2022-12-06 PROCEDURE — G1004: CPT

## 2022-12-06 PROCEDURE — U0003: CPT

## 2022-12-06 PROCEDURE — 85025 COMPLETE CBC W/AUTO DIFF WBC: CPT

## 2022-12-06 PROCEDURE — 0225U NFCT DS DNA&RNA 21 SARSCOV2: CPT

## 2022-12-06 PROCEDURE — 97116 GAIT TRAINING THERAPY: CPT

## 2022-12-06 PROCEDURE — 84100 ASSAY OF PHOSPHORUS: CPT

## 2022-12-06 PROCEDURE — 84484 ASSAY OF TROPONIN QUANT: CPT

## 2022-12-06 PROCEDURE — 83605 ASSAY OF LACTIC ACID: CPT

## 2022-12-06 PROCEDURE — 74177 CT ABD & PELVIS W/CONTRAST: CPT | Mod: MF

## 2022-12-06 PROCEDURE — 81001 URINALYSIS AUTO W/SCOPE: CPT

## 2022-12-06 PROCEDURE — 99239 HOSP IP/OBS DSCHRG MGMT >30: CPT

## 2022-12-06 PROCEDURE — 86703 HIV-1/HIV-2 1 RESULT ANTBDY: CPT

## 2022-12-06 PROCEDURE — 87040 BLOOD CULTURE FOR BACTERIA: CPT

## 2022-12-06 PROCEDURE — 80053 COMPREHEN METABOLIC PANEL: CPT

## 2022-12-06 PROCEDURE — 83735 ASSAY OF MAGNESIUM: CPT

## 2022-12-06 PROCEDURE — 80048 BASIC METABOLIC PNL TOTAL CA: CPT

## 2022-12-06 PROCEDURE — U0005: CPT

## 2022-12-06 PROCEDURE — 96374 THER/PROPH/DIAG INJ IV PUSH: CPT

## 2022-12-06 PROCEDURE — 87186 SC STD MICRODIL/AGAR DIL: CPT

## 2022-12-06 PROCEDURE — 99285 EMERGENCY DEPT VISIT HI MDM: CPT

## 2022-12-06 PROCEDURE — 85610 PROTHROMBIN TIME: CPT

## 2022-12-06 PROCEDURE — 87086 URINE CULTURE/COLONY COUNT: CPT

## 2022-12-06 PROCEDURE — 97163 PT EVAL HIGH COMPLEX 45 MIN: CPT

## 2022-12-06 PROCEDURE — 36415 COLL VENOUS BLD VENIPUNCTURE: CPT

## 2022-12-06 PROCEDURE — 85730 THROMBOPLASTIN TIME PARTIAL: CPT

## 2022-12-06 PROCEDURE — 71045 X-RAY EXAM CHEST 1 VIEW: CPT

## 2022-12-06 PROCEDURE — 87077 CULTURE AEROBIC IDENTIFY: CPT

## 2022-12-06 PROCEDURE — 97530 THERAPEUTIC ACTIVITIES: CPT

## 2022-12-06 PROCEDURE — 96375 TX/PRO/DX INJ NEW DRUG ADDON: CPT

## 2022-12-06 RX ADMIN — ENOXAPARIN SODIUM 40 MILLIGRAM(S): 100 INJECTION SUBCUTANEOUS at 05:49

## 2022-12-06 RX ADMIN — Medication 81 MILLIGRAM(S): at 11:04

## 2022-12-06 RX ADMIN — Medication 100 MICROGRAM(S): at 05:49

## 2022-12-06 RX ADMIN — Medication 1 MILLIGRAM(S): at 11:04

## 2022-12-06 RX ADMIN — Medication 5 MILLIGRAM(S): at 11:04

## 2022-12-06 NOTE — DIETITIAN INITIAL EVALUATION ADULT - PERTINENT MEDS FT
MEDICATIONS  (STANDING):  aspirin enteric coated 81 milliGRAM(s) Oral daily  enoxaparin Injectable 40 milliGRAM(s) SubCutaneous every 24 hours  folic acid 1 milliGRAM(s) Oral daily  latanoprost 0.005% Ophthalmic Solution 1 Drop(s) Both EYES at bedtime  levothyroxine 100 MICROGram(s) Oral daily  oxybutynin XL 5 milliGRAM(s) Oral daily  senna 2 Tablet(s) Oral at bedtime  simvastatin 40 milliGRAM(s) Oral at bedtime    MEDICATIONS  (PRN):  aluminum hydroxide/magnesium hydroxide/simethicone Suspension 30 milliLiter(s) Oral every 4 hours PRN Dyspepsia  bisacodyl 5 milliGRAM(s) Oral every 12 hours PRN Constipation  melatonin 3 milliGRAM(s) Oral at bedtime PRN Insomnia  ondansetron Injectable 4 milliGRAM(s) IV Push every 8 hours PRN Nausea and/or Vomiting

## 2022-12-06 NOTE — PROGRESS NOTE ADULT - REASON FOR ADMISSION
Weakness 2/2 UTI and COVID viral Infection

## 2022-12-06 NOTE — DIETITIAN INITIAL EVALUATION ADULT - PERTINENT LABORATORY DATA
12-05    141  |  107  |  11.0  ----------------------------<  92  4.0   |  24.0  |  0.38<L>    Ca    8.4      05 Dec 2022 05:24  Phos  2.6     12-05  Mg     1.9     12-05

## 2022-12-06 NOTE — PROGRESS NOTE ADULT - ASSESSMENT
89 yr old female with hypertension, hyperlipidemia, hypothyroidism, glaucoma presented with complaints of weakness, dysuria for 5 days prior to presentation. She went to an urgent care and was referred to the ED for hypotension. Noted to have a positive UA and sepsis on admission. She was given fluids and IV Ceftriaxone. Cultures were sent. Also tested positive for COVID, but no hypoxia noted. She was placed in isolation. Urine cultures grew Morganella. She improved clinically and was evaluated by PT, advised home PT. CT abdomen noted, suggestive of neurofibromatosis. No clinical signs of obstruction. Tolerating diet, passing flatus. Denied abdominal pain.     1. UTI:  Completed course of antibiotics.    2. Hyperlipidemia:  Continue statin    3. Hypothyroidism;  Continue Synthroid.    4. DVT ppx:  Lovenox.    discussed with patient.  awaiting RODRIGO.  Cognitively Impaired

## 2022-12-06 NOTE — PROGRESS NOTE ADULT - SUBJECTIVE AND OBJECTIVE BOX
CC: weakness    INTERVAL HPI/OVERNIGHT EVENTS:  no acute events    PHYSICAL EXAM:  General: in no acute distress; multiple scattered neurofibromas   Head: Normocephalic; atraumatic  ENMT: No nasal discharge; airway clear  Neck: Supple; non tender; no masses  Respiratory: No wheezes, rales or rhonchi  Cardiovascular: Regular rate and rhythm. S1 and S2 Normal; No murmurs, gallops or rubs  Gastrointestinal: Soft tender in the lower quadrants; non-distended; Normal bowel sounds  Extremities: Normal range of motion, No clubbing, cyanosis or edema  Vascular: Peripheral pulses palpable 2+ bilaterally  Neurological: Alert and oriented x4  Skin: Warm and dry. No acute rash  Psychiatric: Cooperative and appropriate                          14.1   9.95  )-----------( 193      ( 30 Nov 2022 07:00 )             43.6     11-30    139  |  100  |  15.1  ----------------------------<  89  4.3   |  23.0  |  0.63    Ca    9.2      30 Nov 2022 07:00    MEDICATIONS  (STANDING):  aspirin enteric coated 81 milliGRAM(s) Oral daily  cefTRIAXone Injectable. 1000 milliGRAM(s) IV Push every 24 hours  enoxaparin Injectable 40 milliGRAM(s) SubCutaneous every 24 hours  folic acid 1 milliGRAM(s) Oral daily  latanoprost 0.005% Ophthalmic Solution 1 Drop(s) Both EYES at bedtime  levothyroxine 100 MICROGram(s) Oral daily  oxybutynin XL 5 milliGRAM(s) Oral daily  simvastatin 40 milliGRAM(s) Oral at bedtime    MEDICATIONS  (PRN):  aluminum hydroxide/magnesium hydroxide/simethicone Suspension 30 milliLiter(s) Oral every 4 hours PRN Dyspepsia  melatonin 3 milliGRAM(s) Oral at bedtime PRN Insomnia  ondansetron Injectable 4 milliGRAM(s) IV Push every 8 hours PRN Nausea and/or Vomiting      RADIOLOGY & ADDITIONAL TESTS:
CC: weakness    INTERVAL HPI/OVERNIGHT EVENTS:  no acute events    PHYSICAL EXAM:  General: in no acute distress; multiple scattered neurofibromas   Head: Normocephalic; atraumatic  ENMT: No nasal discharge; airway clear  Neck: Supple; non tender; no masses  Respiratory: No wheezes, rales or rhonchi  Cardiovascular: Regular rate and rhythm. S1 and S2 Normal; No murmurs, gallops or rubs  Gastrointestinal: Soft tender in the lower quadrants; non-distended; Normal bowel sounds  Extremities: Normal range of motion, No clubbing, cyanosis or edema  Vascular: Peripheral pulses palpable 2+ bilaterally  Neurological: Alert and oriented x4  Skin: Warm and dry. No acute rash  Psychiatric: Cooperative and appropriate    MEDICATIONS  (STANDING):  aspirin enteric coated 81 milliGRAM(s) Oral daily  cefTRIAXone Injectable. 1000 milliGRAM(s) IV Push every 24 hours  enoxaparin Injectable 40 milliGRAM(s) SubCutaneous every 24 hours  folic acid 1 milliGRAM(s) Oral daily  latanoprost 0.005% Ophthalmic Solution 1 Drop(s) Both EYES at bedtime  levothyroxine 100 MICROGram(s) Oral daily  oxybutynin XL 5 milliGRAM(s) Oral daily  simvastatin 40 milliGRAM(s) Oral at bedtime    MEDICATIONS  (PRN):  aluminum hydroxide/magnesium hydroxide/simethicone Suspension 30 milliLiter(s) Oral every 4 hours PRN Dyspepsia  melatonin 3 milliGRAM(s) Oral at bedtime PRN Insomnia  ondansetron Injectable 4 milliGRAM(s) IV Push every 8 hours PRN Nausea and/or Vomiting      RADIOLOGY & ADDITIONAL TESTS:
INTERVAL HPI/OVERNIGHT EVENTS:    CC: UTI, COVID, hypertension, hyperlipidemia, hypothyroidism    No overnight events  had BM   denies abdominal pain    Vital Signs Last 24 Hrs  T(C): 36.4 (06 Dec 2022 04:18), Max: 36.4 (05 Dec 2022 15:33)  T(F): 97.5 (06 Dec 2022 04:18), Max: 97.6 (05 Dec 2022 15:33)  HR: 72 (06 Dec 2022 04:18) (68 - 72)  BP: 117/74 (06 Dec 2022 04:18) (117/66 - 124/71)  BP(mean): 83 (05 Dec 2022 22:30) (83 - 83)  RR: 18 (06 Dec 2022 04:18) (18 - 18)  SpO2: 94% (06 Dec 2022 04:18) (94% - 97%)    Parameters below as of 06 Dec 2022 04:18  Patient On (Oxygen Delivery Method): room air        PHYSICAL EXAM:    GENERAL: alert, not in distress  CHEST/LUNG: b/l air entry  HEART: reg  ABDOMEN: soft, bs+  EXTREMITIES:  no edema, tenderness    MEDICATIONS  (STANDING):  aspirin enteric coated 81 milliGRAM(s) Oral daily  enoxaparin Injectable 40 milliGRAM(s) SubCutaneous every 24 hours  folic acid 1 milliGRAM(s) Oral daily  latanoprost 0.005% Ophthalmic Solution 1 Drop(s) Both EYES at bedtime  levothyroxine 100 MICROGram(s) Oral daily  oxybutynin XL 5 milliGRAM(s) Oral daily  senna 2 Tablet(s) Oral at bedtime  simvastatin 40 milliGRAM(s) Oral at bedtime    MEDICATIONS  (PRN):  aluminum hydroxide/magnesium hydroxide/simethicone Suspension 30 milliLiter(s) Oral every 4 hours PRN Dyspepsia  bisacodyl 5 milliGRAM(s) Oral every 12 hours PRN Constipation  melatonin 3 milliGRAM(s) Oral at bedtime PRN Insomnia  ondansetron Injectable 4 milliGRAM(s) IV Push every 8 hours PRN Nausea and/or Vomiting      Allergies    Tylenol (Unknown)    Intolerances          LABS:                          11.8   7.00  )-----------( 220      ( 05 Dec 2022 05:24 )             36.5     12-05    141  |  107  |  11.0  ----------------------------<  92  4.0   |  24.0  |  0.38<L>    Ca    8.4      05 Dec 2022 05:24  Phos  2.6     12-05  Mg     1.9     12-05            RADIOLOGY & ADDITIONAL TESTS:  
CC: Weakness 2/2 UTI and COVID viral Infection (2022 22:14)    HPI:  89 yo female with pmhx HTN, HLD, Glaucoma presenting to the ED for weakness, dysuria, and frequency x 5 days. Daughter-in-law reports urine has been foul smelling over this time, but patient states she hasn't noticed. Patient became increasingly weak, to the point she was unable to stand this morning. She went to urgent care and was found to be hypotensive and sent to the ED. She was initially given Macrobid outpatient, but only took one dose prior to going to urgent care and being sent in. She feels well now, no headache, dizziness, chest pain, SOB, N/V/D. She reports some abdominal cramping earlier that has resolved. (2022 22:14)    INTERVAL HPI/OVERNIGHT EVENTS:    Vital Signs Last 24 Hrs  T(C): 37.4 (2022 06:35), Max: 38.7 (2022 12:21)  T(F): 99.3 (2022 06:35), Max: 101.6 (2022 12:21)  HR: 86 (2022 06:35) (84 - 94)  BP: 110/71 (2022 06:35) (109/64 - 125/76)  BP(mean): --  RR: 17 (2022 06:35) (16 - 17)  SpO2: 93% (2022 06:35) (93% - 96%)    Parameters below as of 2022 06:35  Patient On (Oxygen Delivery Method): room air    PHYSICAL EXAM:  General: in no acute distress; multiple scattered neurofibromas   Head: Normocephalic; atraumatic  ENMT: No nasal discharge; airway clear  Neck: Supple; non tender; no masses  Respiratory: No wheezes, rales or rhonchi  Cardiovascular: Regular rate and rhythm. S1 and S2 Normal; No murmurs, gallops or rubs  Gastrointestinal: Soft tender in the lower quadrants; non-distended; Normal bowel sounds  Extremities: Normal range of motion, No clubbing, cyanosis or edema  Vascular: Peripheral pulses palpable 2+ bilaterally  Neurological: Alert and oriented x4  Skin: Warm and dry. No acute rash  Psychiatric: Cooperative and appropriate    I&O's Detail    CARDIAC MARKERS ( 2022 11:50 )  x     / <0.01 ng/mL / x     / x     / x                            14.1   9.95  )-----------( 193      ( 2022 07:00 )             43.6     2022 07:00    139    |  100    |  15.1   ----------------------------<  89     4.3     |  23.0   |  0.63     Ca    9.2        2022 07:00    TPro  7.1    /  Alb  3.8    /  TBili  0.6    /  DBili  x      /  AST  27     /  ALT  18     /  AlkPhos  72     2022 11:50    PT/INR - ( 2022 11:50 )   PT: 14.5 sec;   INR: 1.25 ratio      PTT - ( 2022 11:50 )  PTT:28.3 sec  CAPILLARY BLOOD GLUCOSE    LIVER FUNCTIONS - ( 2022 11:50 )  Alb: 3.8 g/dL / Pro: 7.1 g/dL / ALK PHOS: 72 U/L / ALT: 18 U/L / AST: 27 U/L / GGT: x           Urinalysis Basic - ( 2022 11:50 )    Color: Parvin / Appearance: Clear / S.015 / pH: x  Gluc: x / Ketone: Moderate  / Bili: Large / Urobili: 8   Blood: x / Protein: 30 mg/dL / Nitrite: Positive   Leuk Esterase: Negative / RBC: 0-2 /HPF / WBC 6-10 /HPF   Sq Epi: x / Non Sq Epi: Few / Bacteria: Moderate    MEDICATIONS  (STANDING):  aspirin enteric coated 81 milliGRAM(s) Oral daily  cefTRIAXone Injectable. 1000 milliGRAM(s) IV Push every 24 hours  enoxaparin Injectable 40 milliGRAM(s) SubCutaneous every 24 hours  folic acid 1 milliGRAM(s) Oral daily  latanoprost 0.005% Ophthalmic Solution 1 Drop(s) Both EYES at bedtime  levothyroxine 100 MICROGram(s) Oral daily  oxybutynin XL 5 milliGRAM(s) Oral daily  simvastatin 40 milliGRAM(s) Oral at bedtime    MEDICATIONS  (PRN):  aluminum hydroxide/magnesium hydroxide/simethicone Suspension 30 milliLiter(s) Oral every 4 hours PRN Dyspepsia  melatonin 3 milliGRAM(s) Oral at bedtime PRN Insomnia  ondansetron Injectable 4 milliGRAM(s) IV Push every 8 hours PRN Nausea and/or Vomiting      RADIOLOGY & ADDITIONAL TESTS:
INTERVAL HPI/OVERNIGHT EVENTS:    CC:  UTI, COVID, hypertension, hyperlipidemia, hypothyroidism    No overnight events  constipated  denies abdominal pain  tolerating diet, passing flatus    Vital Signs Last 24 Hrs  T(C): 36.4 (05 Dec 2022 04:56), Max: 36.5 (04 Dec 2022 17:57)  T(F): 97.5 (05 Dec 2022 04:56), Max: 97.7 (04 Dec 2022 17:57)  HR: 76 (05 Dec 2022 04:56) (71 - 80)  BP: 124/64 (05 Dec 2022 04:56) (115/67 - 124/64)  BP(mean): --  RR: 18 (05 Dec 2022 04:56) (18 - 18)  SpO2: 97% (05 Dec 2022 04:56) (95% - 98%)    Parameters below as of 05 Dec 2022 04:56  Patient On (Oxygen Delivery Method): room air        PHYSICAL EXAM:    GENERAL: alert, not in distress  CHEST/LUNG: b/l air entry  HEART: reg  ABDOMEN: soft, non tender, BS+  EXTREMITIES:  no edema, tenderness    MEDICATIONS  (STANDING):  aspirin enteric coated 81 milliGRAM(s) Oral daily  enoxaparin Injectable 40 milliGRAM(s) SubCutaneous every 24 hours  folic acid 1 milliGRAM(s) Oral daily  lactulose Syrup 10 Gram(s) Oral once  latanoprost 0.005% Ophthalmic Solution 1 Drop(s) Both EYES at bedtime  levothyroxine 100 MICROGram(s) Oral daily  oxybutynin XL 5 milliGRAM(s) Oral daily  senna 2 Tablet(s) Oral at bedtime  simvastatin 40 milliGRAM(s) Oral at bedtime    MEDICATIONS  (PRN):  aluminum hydroxide/magnesium hydroxide/simethicone Suspension 30 milliLiter(s) Oral every 4 hours PRN Dyspepsia  bisacodyl 5 milliGRAM(s) Oral every 12 hours PRN Constipation  melatonin 3 milliGRAM(s) Oral at bedtime PRN Insomnia  ondansetron Injectable 4 milliGRAM(s) IV Push every 8 hours PRN Nausea and/or Vomiting      Allergies    Tylenol (Unknown)    Intolerances          LABS:                          11.8   7.00  )-----------( 220      ( 05 Dec 2022 05:24 )             36.5     12-05    141  |  107  |  11.0  ----------------------------<  92  4.0   |  24.0  |  0.38<L>    Ca    8.4      05 Dec 2022 05:24  Phos  2.6     12-05  Mg     1.9     12-05            RADIOLOGY & ADDITIONAL TESTS:  
INTERVAL HPI/OVERNIGHT EVENTS:    CC: UTI, COVID, hypertension, hyperlipidemia, hypothyroidism      No overnight events  reports constipation  no abdominal pain     Vital Signs Last 24 Hrs  T(C): 36.4 (04 Dec 2022 11:00), Max: 36.8 (04 Dec 2022 04:20)  T(F): 97.5 (04 Dec 2022 11:00), Max: 98.2 (04 Dec 2022 04:20)  HR: 71 (04 Dec 2022 11:00) (71 - 79)  BP: 115/67 (04 Dec 2022 11:00) (115/67 - 120/76)  BP(mean): --  RR: 18 (04 Dec 2022 11:00) (18 - 18)  SpO2: 95% (04 Dec 2022 11:00) (94% - 97%)    Parameters below as of 04 Dec 2022 11:00  Patient On (Oxygen Delivery Method): room air        PHYSICAL EXAM:    GENERAL: alert, not in distress  CHEST/LUNG: b/l air entry  HEART: reg  ABDOMEN: soft, bs+  EXTREMITIES:  no edema, tenderness    MEDICATIONS  (STANDING):  aspirin enteric coated 81 milliGRAM(s) Oral daily  enoxaparin Injectable 40 milliGRAM(s) SubCutaneous every 24 hours  folic acid 1 milliGRAM(s) Oral daily  latanoprost 0.005% Ophthalmic Solution 1 Drop(s) Both EYES at bedtime  levothyroxine 100 MICROGram(s) Oral daily  oxybutynin XL 5 milliGRAM(s) Oral daily  simvastatin 40 milliGRAM(s) Oral at bedtime    MEDICATIONS  (PRN):  aluminum hydroxide/magnesium hydroxide/simethicone Suspension 30 milliLiter(s) Oral every 4 hours PRN Dyspepsia  melatonin 3 milliGRAM(s) Oral at bedtime PRN Insomnia  ondansetron Injectable 4 milliGRAM(s) IV Push every 8 hours PRN Nausea and/or Vomiting      Allergies    Tylenol (Unknown)    Intolerances          LABS:                  RADIOLOGY & ADDITIONAL TESTS:  
INTERVAL HPI/OVERNIGHT EVENTS:    CC: UTI, COVID, hypertension, hyperlipidemia, hypothyroidism    Chart and course reviewed.  No overnight events  states she feels fine.  lives with son, but he moving out of state.  needs aides to be reinstated.    Vital Signs Last 24 Hrs  T(C): 36.3 (03 Dec 2022 11:20), Max: 36.5 (02 Dec 2022 17:00)  T(F): 97.4 (03 Dec 2022 11:20), Max: 97.7 (02 Dec 2022 17:00)  HR: 73 (03 Dec 2022 11:20) (73 - 89)  BP: 102/66 (03 Dec 2022 11:20) (102/66 - 133/59)  BP(mean): --  RR: 18 (03 Dec 2022 11:20) (18 - 18)  SpO2: 94% (03 Dec 2022 11:20) (93% - 94%)    Parameters below as of 03 Dec 2022 11:20  Patient On (Oxygen Delivery Method): room air        PHYSICAL EXAM:    GENERAL: alert, not in distress, sitting in chair  CHEST/LUNG: b/l air entry  HEART: reg  ABDOMEN: soft, bs+  EXTREMITIES:  no edema, tenderness    MEDICATIONS  (STANDING):  aspirin enteric coated 81 milliGRAM(s) Oral daily  enoxaparin Injectable 40 milliGRAM(s) SubCutaneous every 24 hours  folic acid 1 milliGRAM(s) Oral daily  latanoprost 0.005% Ophthalmic Solution 1 Drop(s) Both EYES at bedtime  levothyroxine 100 MICROGram(s) Oral daily  oxybutynin XL 5 milliGRAM(s) Oral daily  simvastatin 40 milliGRAM(s) Oral at bedtime    MEDICATIONS  (PRN):  aluminum hydroxide/magnesium hydroxide/simethicone Suspension 30 milliLiter(s) Oral every 4 hours PRN Dyspepsia  melatonin 3 milliGRAM(s) Oral at bedtime PRN Insomnia  ondansetron Injectable 4 milliGRAM(s) IV Push every 8 hours PRN Nausea and/or Vomiting      Allergies    Tylenol (Unknown)    Intolerances          LABS:                  RADIOLOGY & ADDITIONAL TESTS:

## 2022-12-06 NOTE — DIETITIAN INITIAL EVALUATION ADULT - ORAL INTAKE PTA/DIET HISTORY
Pt reported good po intake PTA and currently. Reported  lbs, denied recent wt changes. Encouraged po intake and HBV protein. Food preferences obtained.

## 2022-12-06 NOTE — DIETITIAN INITIAL EVALUATION ADULT - OTHER INFO
89 yr old female with hypertension, hyperlipidemia, hypothyroidism, glaucoma presented with complaints of weakness, dysuria for 5 days prior to presentation. She went to an urgent care and was referred to the ED for hypotension. Noted to have a positive UA and sepsis on admission. She was given fluids and IV Ceftriaxone. Cultures were sent. Also tested positive for COVID, but no hypoxia noted. She was placed in isolation. Urine cultures grew Morganella. She improved clinically and was evaluated by PT, advised home PT. CT abdomen noted, suggestive of neurofibromatosis. No clinical signs of obstruction. Tolerating diet, passing flatus. Denied abdominal pain.

## 2022-12-16 ENCOUNTER — APPOINTMENT (OUTPATIENT)
Dept: COLORECTAL SURGERY | Facility: CLINIC | Age: 87
End: 2022-12-16

## 2022-12-23 ENCOUNTER — APPOINTMENT (OUTPATIENT)
Dept: DERMATOLOGY | Facility: CLINIC | Age: 87
End: 2022-12-23

## 2023-02-16 NOTE — H&P PST ADULT - TEMPERATURE IN FAHRENHEIT (DEGREES F)
Scheduled date of COLONOSCOPY (as of today) 4/19/23  Physician performing: Dr Hoda Dixon  Location of procedure:  [place]  Instructions given to patient:  miralax  Clearances: na
97.1

## 2023-03-03 ENCOUNTER — EMERGENCY (EMERGENCY)
Facility: HOSPITAL | Age: 88
LOS: 1 days | Discharge: HOME | End: 2023-03-03
Attending: STUDENT IN AN ORGANIZED HEALTH CARE EDUCATION/TRAINING PROGRAM
Payer: MEDICARE

## 2023-03-03 VITALS
SYSTOLIC BLOOD PRESSURE: 135 MMHG | DIASTOLIC BLOOD PRESSURE: 84 MMHG | OXYGEN SATURATION: 98 % | HEART RATE: 72 BPM | RESPIRATION RATE: 20 BRPM | TEMPERATURE: 98 F

## 2023-03-03 DIAGNOSIS — Z98.49 CATARACT EXTRACTION STATUS, UNSPECIFIED EYE: Chronic | ICD-10-CM

## 2023-03-03 DIAGNOSIS — Z98.891 HISTORY OF UTERINE SCAR FROM PREVIOUS SURGERY: Chronic | ICD-10-CM

## 2023-03-03 DIAGNOSIS — Z96.651 PRESENCE OF RIGHT ARTIFICIAL KNEE JOINT: Chronic | ICD-10-CM

## 2023-03-03 DIAGNOSIS — Z90.49 ACQUIRED ABSENCE OF OTHER SPECIFIED PARTS OF DIGESTIVE TRACT: Chronic | ICD-10-CM

## 2023-03-03 PROCEDURE — 73070 X-RAY EXAM OF ELBOW: CPT

## 2023-03-03 PROCEDURE — 90471 IMMUNIZATION ADMIN: CPT

## 2023-03-03 PROCEDURE — 99284 EMERGENCY DEPT VISIT MOD MDM: CPT | Mod: 25

## 2023-03-03 PROCEDURE — 73090 X-RAY EXAM OF FOREARM: CPT | Mod: 26,LT

## 2023-03-03 PROCEDURE — 99284 EMERGENCY DEPT VISIT MOD MDM: CPT | Mod: GC

## 2023-03-03 PROCEDURE — 90715 TDAP VACCINE 7 YRS/> IM: CPT

## 2023-03-03 PROCEDURE — 73590 X-RAY EXAM OF LOWER LEG: CPT

## 2023-03-03 PROCEDURE — 73562 X-RAY EXAM OF KNEE 3: CPT

## 2023-03-03 PROCEDURE — 73070 X-RAY EXAM OF ELBOW: CPT | Mod: 26,LT

## 2023-03-03 PROCEDURE — 73590 X-RAY EXAM OF LOWER LEG: CPT | Mod: 26,LT

## 2023-03-03 PROCEDURE — 73562 X-RAY EXAM OF KNEE 3: CPT | Mod: 26,LT

## 2023-03-03 PROCEDURE — 73090 X-RAY EXAM OF FOREARM: CPT

## 2023-03-03 RX ORDER — IBUPROFEN 200 MG
600 TABLET ORAL ONCE
Refills: 0 | Status: COMPLETED | OUTPATIENT
Start: 2023-03-03 | End: 2023-03-03

## 2023-03-03 RX ORDER — TETANUS TOXOID, REDUCED DIPHTHERIA TOXOID AND ACELLULAR PERTUSSIS VACCINE, ADSORBED 5; 2.5; 8; 8; 2.5 [IU]/.5ML; [IU]/.5ML; UG/.5ML; UG/.5ML; UG/.5ML
0.5 SUSPENSION INTRAMUSCULAR ONCE
Refills: 0 | Status: COMPLETED | OUTPATIENT
Start: 2023-03-03 | End: 2023-03-03

## 2023-03-03 RX ADMIN — Medication 600 MILLIGRAM(S): at 21:46

## 2023-03-03 RX ADMIN — TETANUS TOXOID, REDUCED DIPHTHERIA TOXOID AND ACELLULAR PERTUSSIS VACCINE, ADSORBED 0.5 MILLILITER(S): 5; 2.5; 8; 8; 2.5 SUSPENSION INTRAMUSCULAR at 21:47

## 2023-03-03 NOTE — ED PROVIDER NOTE - CLINICAL SUMMARY MEDICAL DECISION MAKING FREE TEXT BOX
Patient is a 89 yo female with PMHx neurofibromatosis-1, glaucoma, HTN, anxiety, hypothyroidism, HLD BIBEMS from [facility] with an abrasion to L forearm s/p mechanical fall. No complaints at this time. As per EMS and patient, patient had a mechanical trip and fall that was witnessed, landing on her L side. Denies blood thinners, headstrike, LOC, AMS. Patient aaox4, moving all extremities equally, small abrasion to L knee, skin tear/avulsion to L forearm, no active bleeding. No complaints at this time.     Will control pain, provide wound care and wound dressing, check xrays to r/o fracture, reassess. Patient is a 89 yo female with PMHx neurofibromatosis-1, glaucoma, HTN, anxiety, hypothyroidism, HLD BIBEMS from [facility] with an abrasion to L forearm s/p mechanical fall. No complaints at this time. As per EMS and patient, patient had a mechanical trip and fall that was witnessed, landing on her L side. Denies blood thinners, headstrike, LOC, AMS. Patient aaox4, moving all extremities equally, small abrasion to L knee, skin tear/avulsion to L forearm and elbow region, no active bleeding. No complaints at this time.     Will control pain, provide wound care and wound dressing, check xrays to r/o fracture, reassess.

## 2023-03-03 NOTE — ED PROVIDER NOTE - CARE PROVIDER_API CALL
Dov Garrido)  Gastroenterology; Internal Medicine  [address] [place] [address]  Phone: [phone]  Fax: [phone]  Established Patient  Follow Up Time: 4-6 Days

## 2023-03-03 NOTE — ED PROVIDER NOTE - NSFOLLOWUPINSTRUCTIONS_ED_ALL_ED_FT
Keep the area clean and dry.     Wash with warm soapy water after 48 hours to ensure the area stays clean.     You can change the skin avulsion dressing routinely to ensure it heals well.     Follow up with your primary doctor for repeat evaluation as needed.    Return here or go to the nearest emergency department for repeat evaluation if you develop new symptoms of acute concern such as severe pain, inability to walk, fevers with discharge from any wounds, or any other new symptoms of acute concern.

## 2023-03-03 NOTE — ED ADULT NURSE NOTE - CHIEF COMPLAINT QUOTE
pt BIBEMS from [email or web address] s/p trip and fall resulting in abrasion to L forearm. bandage applied, bleeding controlled. denies head strike, LOC, blood thinners.

## 2023-03-03 NOTE — ED PROVIDER NOTE - CARE PLAN
1 Principal Discharge DX:	Fall  Secondary Diagnosis:	Abrasion  Secondary Diagnosis:	Avulsion, skin  Secondary Diagnosis:	Contusion

## 2023-03-03 NOTE — ED PROVIDER NOTE - ATTENDING CONTRIBUTION TO CARE
EVELYN Cleary: 89-year-old female presenting status post fall at the [facility] in [place], has a small abrasion with contusion of left superior tibial region as well as skin avulsion just distal to the left elbow. Will check x-rays, patient declining pain medications, no other complaints at this time.  If films are negative, was a witnessed fall, no head strike does not need CT imaging, is alert and oriented here and stable for return to [facility].    I have personally performed a face to face diagnostic evaluation on this patient.  I have reviewed the resident's note and agree with the history, exam, and plan of care, except as noted.   My medical decision making and observations are found above.

## 2023-03-03 NOTE — ED PROVIDER NOTE - OBJECTIVE STATEMENT
Patient is a 89 yo female with PMHx neurofibromatosis-1, glaucoma, HTN, anxiety, hypothyroidism, HLD BIBEMS from [facility] with an abrasion to L forearm s/p mechanical fall. As per EMS and patient, patient had a mechanical trip and fall that was witnessed, landing on her L side. Denies blood thinners, headstrike, LOC, AMS. Patient aaox4, moving all extremities equally, small abrasion to L knee, skin tear/avulsion to L forearm, no active bleeding. No complaints at this time. Denies fevers, chills, dizziness, lightheadedness, dysphagia, dysarthria, diplopia, photophobia, syncope, cough, congestion, SOB, CP, abdominal pain, neck pain, back pain, diarrhea, dysuria, hematuria, hematochezia, hematemesis, n/v, recent travel, sick contacts, leg swelling.

## 2023-03-03 NOTE — ED ADULT NURSE NOTE - OBJECTIVE STATEMENT
Assumed care of pt at 2140. Pt A&Ox4 c/o a fall and left arm abrasion, the pt states that she tripped and fell resulting in an abrasion to left forearm, a bandage was applied and bleeding was controlled, pt denies N/V/D/CP/SOB/LOC/blood thinners, pt resting comfortably showing no signs of respiratory distress or pain, the pt is calm and cooperative

## 2023-03-03 NOTE — ED ADULT TRIAGE NOTE - CHIEF COMPLAINT QUOTE
pt BIBEMS from [facility] s/p trip and fall resulting in abrasion to L forearm. bandage applied, bleeding controlled. denies head strike, LOC, blood thinners.

## 2023-03-03 NOTE — ED PROVIDER NOTE - WR ORDER NAME 4
What Type Of Note Output Would You Prefer (Optional)?: Standard Output Is The Patient Presenting As Previously Scheduled?: Yes How Severe Is Your Rash?: mild Is This A New Presentation, Or A Follow-Up?: Rash Additional History: Pt states she uses TAC given by pcp for 2 weeks at a time with flares then tapers, but is not sure it helps much Xray Knee 3 Views, Left

## 2023-03-03 NOTE — ED PROVIDER NOTE - PATIENT PORTAL LINK FT
You can access the FollowMyHealth Patient Portal offered by [facility] by registering at the following website: [email or web address]. By joining [employer]’s FollowMyHealth portal, you will also be able to view your health information using other applications (apps) compatible with our system.

## 2023-03-03 NOTE — ED PROVIDER NOTE - PHYSICAL EXAMINATION
Constitutional: Well appearing, awake, alert, oriented to person, place, and time/situation and in no apparent distress  ENMT: Airway patent nasal mucosa clear. Mouth with normal mucosa. Throat has no vesicles no oropharyngeal exudates and uvula is midline. No blood in the oropharynx  EYES: clear bilaterally, pupils equal, round and reactive to light  Cardiac: Regular rate, regular rhythm. Heart sounds S1, S2. No murmurs, rubs or gallops. Good capillary refill, 2+ pulses, no peripheral edema  Respiratory: Lungs CTAB, no use of accessory muscles, no crackles, satting 95% on RA in no distress  Gastrointestinal: Abdomen nondistended, non-tender, no rebound guarding or peritoneal signs  Genitourinary: No CVA tenderness, pelvis nontender, bladder nondistended  Musculoskeletal: Spine appears normal, range of motion is not limited, no muscle or joint tenderness. Small abrasion to L knee, full ROM, sensation reflexes intact; small skin tear to L forearm, no active bleeding, full rom sensation reflexes intact   Neurological: Alert and oriented, no focal deficits, no motor or sensory deficits. CN 2-12 intact, PERRLA, EOMI, No FND, moving all extremities equally, sensation intact, No dysmetria, no ataxia, negative heel-shin, 5/5 strength   Skin: 2 cm superficial avulsion/skin tear to L forearm, no lacerations, no active bleeding Constitutional: Well appearing, awake, alert, oriented to person, place, and time/situation and in no apparent distress  ENMT: Airway patent nasal mucosa clear. Mouth with normal mucosa. Throat has no vesicles no oropharyngeal exudates and uvula is midline. No blood in the oropharynx  EYES: clear bilaterally, pupils equal, round and reactive to light  Cardiac: Regular rate, regular rhythm. Heart sounds S1, S2. No murmurs, rubs or gallops. Good capillary refill, 2+ pulses, no peripheral edema  Respiratory: Lungs CTAB, no use of accessory muscles, no crackles, satting 95% on RA in no distress  Gastrointestinal: Abdomen nondistended, non-tender, no rebound guarding or peritoneal signs  Genitourinary: No CVA tenderness, pelvis nontender, bladder nondistended  Musculoskeletal: Spine appears normal, range of motion is not limited, no muscle or joint tenderness. Small abrasion to L knee, full ROM, sensation reflexes intact; small skin tear to L forearm, no active bleeding, full rom sensation reflexes intact   Neurological: Alert and oriented, no focal deficits, no motor or sensory deficits. CN 2-12 intact, PERRLA, EOMI, No FND, moving all extremities equally, sensation intact, No dysmetria, no ataxia, negative heel-shin, 5/5 strength   Skin: 2 cm superficial avulsion/skin tear to L forearm and elbow region, no lacerations, no active bleeding

## 2023-03-04 VITALS
HEART RATE: 68 BPM | TEMPERATURE: 98 F | SYSTOLIC BLOOD PRESSURE: 116 MMHG | RESPIRATION RATE: 19 BRPM | DIASTOLIC BLOOD PRESSURE: 70 MMHG | OXYGEN SATURATION: 98 %

## 2023-03-04 NOTE — ED ADULT NURSE REASSESSMENT NOTE - NEURO ASSESSMENT
- - -
Patient called in with questions regarding up coming appointment and if labs are needed. Best contact # [phone] patient advise its okay to leave a detailed message.   
- - -

## 2023-03-14 ENCOUNTER — APPOINTMENT (OUTPATIENT)
Dept: GASTROENTEROLOGY | Facility: CLINIC | Age: 88
End: 2023-03-14

## 2023-04-24 ENCOUNTER — APPOINTMENT (OUTPATIENT)
Dept: DERMATOLOGY | Facility: CLINIC | Age: 88
End: 2023-04-24
Payer: MEDICARE

## 2023-04-24 PROCEDURE — 99213 OFFICE O/P EST LOW 20 MIN: CPT | Mod: 25

## 2023-04-24 PROCEDURE — 11102 TANGNTL BX SKIN SINGLE LES: CPT

## 2023-05-17 ENCOUNTER — OFFICE (OUTPATIENT)
Dept: URBAN - METROPOLITAN AREA CLINIC 115 | Facility: CLINIC | Age: 88
Setting detail: OPHTHALMOLOGY
End: 2023-05-17
Payer: MEDICARE

## 2023-05-17 DIAGNOSIS — H01.004: ICD-10-CM

## 2023-05-17 DIAGNOSIS — H40.1132: ICD-10-CM

## 2023-05-17 DIAGNOSIS — H26.491: ICD-10-CM

## 2023-05-17 DIAGNOSIS — H26.492: ICD-10-CM

## 2023-05-17 DIAGNOSIS — H47.211: ICD-10-CM

## 2023-05-17 DIAGNOSIS — H52.4: ICD-10-CM

## 2023-05-17 DIAGNOSIS — H04.122: ICD-10-CM

## 2023-05-17 DIAGNOSIS — H01.001: ICD-10-CM

## 2023-05-17 DIAGNOSIS — H35.40: ICD-10-CM

## 2023-05-17 DIAGNOSIS — H53.433: ICD-10-CM

## 2023-05-17 PROBLEM — H02.135 ECTROPION-SENILE; RIGHT LOWER LID, LEFT LOWER LID: Status: ACTIVE | Noted: 2023-05-17

## 2023-05-17 PROBLEM — H02.132 ECTROPION-SENILE; RIGHT LOWER LID, LEFT LOWER LID: Status: ACTIVE | Noted: 2023-05-17

## 2023-05-17 PROCEDURE — 99213 OFFICE O/P EST LOW 20 MIN: CPT | Performed by: OPHTHALMOLOGY

## 2023-05-17 PROCEDURE — 92133 CPTRZD OPH DX IMG PST SGM ON: CPT | Performed by: OPHTHALMOLOGY

## 2023-05-17 PROCEDURE — 92015 DETERMINE REFRACTIVE STATE: CPT | Performed by: OPHTHALMOLOGY

## 2023-05-17 PROCEDURE — 92083 EXTENDED VISUAL FIELD XM: CPT | Performed by: OPHTHALMOLOGY

## 2023-05-17 ASSESSMENT — REFRACTION_CURRENTRX
OS_AXIS: 123
OS_VPRISM_DIRECTION: SV
OS_SPHERE: +1.00
OS_CYLINDER: -0.50
OD_SPHERE: -0.25
OS_OVR_VA: 20/
OD_VPRISM_DIRECTION: SV
OD_OVR_VA: 20/

## 2023-05-17 ASSESSMENT — LID EXAM ASSESSMENTS
OD_BLEPHARITIS: RUL T
OS_BLEPHARITIS: LUL T

## 2023-05-17 ASSESSMENT — REFRACTION_MANIFEST
OS_AXIS: 165
OS_SPHERE: PLANO
OD_VA1: 20/25-
OD_ADD: +2.25
OS_AXIS: 125
OS_SPHERE: -1.25
OD_CYLINDER: -1.50
OD_SPHERE: -0.50
OS_CYLINDER: -2.50
OD_ADD: +2.50
OD_AXIS: 015
OD_SPHERE: -0.25
OS_CYLINDER: -0.50
OS_ADD: +2.25
OS_ADD: +2.50
OS_VA1: 20/25-

## 2023-05-17 ASSESSMENT — SPHEQUIV_DERIVED
OD_SPHEQUIV: -1.25
OD_SPHEQUIV: -1.25
OS_SPHEQUIV: -1.5

## 2023-05-17 ASSESSMENT — REFRACTION_AUTOREFRACTION
OD_CYLINDER: -1.50
OD_AXIS: 027
OD_SPHERE: -0.50
OS_SPHERE: UTP

## 2023-05-17 ASSESSMENT — VISUAL ACUITY
OD_BCVA: 20/60
OS_BCVA: 20/80

## 2023-05-17 ASSESSMENT — CONFRONTATIONAL VISUAL FIELD TEST (CVF)
OD_FINDINGS: FULL
OS_FINDINGS: FULL

## 2023-05-17 ASSESSMENT — PACHYMETRY
OS_CT_UM: 472
OD_CT_CORRECTION: 4
OS_CT_CORRECTION: 5
OD_CT_UM: 492

## 2023-05-17 ASSESSMENT — LID POSITION - ECTROPION
OS_ECTROPION: LLL 2+
OD_ECTROPION: RLL 1+

## 2023-05-17 ASSESSMENT — TONOMETRY: OD_IOP_MMHG: 11

## 2023-06-12 ENCOUNTER — APPOINTMENT (OUTPATIENT)
Dept: DERMATOLOGY | Facility: CLINIC | Age: 88
End: 2023-06-12
Payer: MEDICARE

## 2023-06-12 PROCEDURE — 99214 OFFICE O/P EST MOD 30 MIN: CPT

## 2023-08-02 ENCOUNTER — APPOINTMENT (OUTPATIENT)
Dept: DERMATOLOGY | Facility: CLINIC | Age: 88
End: 2023-08-02
Payer: MEDICARE

## 2023-08-02 DIAGNOSIS — C44.311 BASAL CELL CARCINOMA OF SKIN OF NOSE: ICD-10-CM

## 2023-08-02 PROCEDURE — 17311 MOHS 1 STAGE H/N/HF/G: CPT

## 2023-08-02 PROCEDURE — 17312 MOHS ADDL STAGE: CPT

## 2023-08-02 NOTE — HISTORY OF PRESENT ILLNESS
[FreeTextEntry1] : BCC Nasal Supratip [de-identified] : Mohs Surgery Consultation  8/2/23  Referred by: Dr. Rankin  We had the pleasure of seeing your patient in consultation for Mohs Micrographic Surgery.  Ms. 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 Has had other BCCs in the past. This lesion presented as a growing bleeding pink plaque x roughly 8-12 months, perhaps longer prior to biopsy. No prior treatment. -schedule for Mohs today followed by in office repair with Dr. Mendez in Plastics  Pertinent positives noted below  History of HIV or hepatitis: No Blood thinners: none Antibiotic Prophylaxis: None  Medical implants: None  Social History: no tobacco or alcohol   The patient's review of systems questionnaire was reviewed. Education needs were identified. There were no barriers to learning.  Mohs surgery consultation for BCC Nasal Supratip  -- I explained the treatment options of topical immunomodulatory or chemotherapy, electrodessication and curettage, radiation therapy, excision and Mohs surgery.  I recommended Mohs surgery due to the tumor type, location, and ill-defined nature of cancer.   Mohs Appropriate Use Criteria (AUC) Score: 9  I explained that following extirpation there will be a full thickness defect of the involved area. The reconstructive options will be based on the defect size and surrounding tissue laxity of the involved area. Primary closure is only possible for smaller defects. For larger defects, local tissue rearrangement or skin grafting may be necessary. Risks following layered primary closure or local tissue rearrangement include wound dehiscence, contour irregularity, bleeding, infection, and paresthesia (nerve damage including sensory deficit or motor weakness). Risks following skin grafting include wound dehiscence, skin graft nonadherence (partial or complete), contour irregularity, bleeding, infection, paresthesia, and donor site complications. I explained that the patient will need to abstain from physical activity for 1-2 weeks following the surgery, that they would heal with a scar, and also discussed the chances of infection, bleeding, potential sensory or motor nerve damage, and recurrence.  The patient indicated that s/he understood the risks and wished to proceed today -- In particular, for reconstruction we discussed repair with Dr. Mendez as scheduled today  Thank you for this Mohs surgery referral. We recommended that Ms. ANSLEY ISSAC follow up with Her referring dermatologist for routine skin exams following surgery.

## 2023-08-02 NOTE — PHYSICAL EXAM
[Alert] : alert [Oriented x 3] : ~L oriented x 3 [Well Nourished] : well nourished [Conjunctiva Non-injected] : conjunctiva non-injected [No Visual Lymphadenopathy] : no visual  lymphadenopathy [No Clubbing] : no clubbing [No Edema] : no edema [No Bromhidrosis] : no bromhidrosis [No Chromhidrosis] : no chromhidrosis [Hair] : Hair [Scalp] : Scalp [Face] : Face [Nose] : Nose [Eyelids] : Eyelids [Ears] : Ears [Lips] : Lips [Neck] : Neck [Nodes] : Nodes [FreeTextEntry3] : -nasal supratip with 2.5 cm x 1.4 cm pink pearly plaque -no cervical adenopathy

## 2023-08-02 NOTE — PROCEDURE
[TextEntry] : Mohs Surgery Procedure Note  A surgical time out was taken to confirm the patient's correct identity and the correct site. The operative site was identified by the patient and surgical team prior to surgery and the patient agreed to proceed with the surgical site which was marked prior to anesthesia infiltration.   Mohs Micrographic Surgery Report  Date Collected: 08/02/2023  Date Received: Same as above Date Verified: Same as above Attending Surgeon: Elmer Winston MD Assistants: Kaveh Juarez RN, Florecita Robert LPN, Mague Sultana MA  Procedure#:  Diagnosis: BCC Location: nasal supratip Pre-op Size: 2.5 cm x 1.4 cm  Post-Operative Final Defect Size: 5.0 cm x 4.0 cm   Stages (number of pieces per stage): 5 (2/1, 1/2, 1/3, 2/4, 2/5) Pathology, if tumor noted in stages: Debulk with atypical basaloid cells in nodules and angulated aggregates consistent with nodular/infiltrative BCC. Stage I with nodules of atypical basaloid cells in the dermis of piece 2 consistent with residual BCC. Stage II with nodular aggregates consistent with BCC in the dermis of piece 3. Stage III with nodular aggregates of basaloid cells in the dermis of piece 4 consistent with residual BCC. Stage IV with nodular aggregates of basaloid cells in the dermis of piece 6 consistent with residual BCC, Stage V with sparse perivascular lymphocytic infiltrate, a focal area of bland spindles cells in the dermis consistent with a neurofibroma (hx of known NF1) in the dermis of piece 8) and no evidence of residual carcinoma. Indications for procedure: Ill-defined tumor margins, high-priority anatomic location for preservation of normal tissue, aggressive histologic nature of the tumor Repair type: N/A (referral for repair) Total volume of anesthesia: 18 mL   Mohs Procedure Report:   The patient was escorted to the outpatient surgical operatory.  The proposed Mohs procedure and reconstruction options were discussed with the patient. The risks, benefits, and alternatives were discussed and the patient signed a written consent form. A time out was taken to confirm the patient's identity and the exact location of the skin cancer.  After the patient was placed in a recumbent position, the surgical site was cleaned with alcohol and either Betadine (for eyes and ears) or chlorhexidine gluconate, draped, and infiltrated with 1% buffered lidocaine with 1:100,000 epinephrine local anesthetic.  A sterile surgical marking pen was used to outline a thin margin of normal-appearing skin around the tumor. A beveled incision was then made using a scalpel. Small orienting nicks were made on the specimen and the surrounding skin. The tissue was then sharply dissected from the surrounding skin. Hemostasis was maintained with the electrosurgical unit and/or pressure. A temporary dressing was placed on the surgical defect until the frozen section slides were interpreted. The oriented specimen was placed in a [place]if[place] dish and transported to the [facility] laboratory.  For each stage of the procedure, a visual representation of the specimen was drawn on a Mohs map. This map graphically depicts the specimen's two-dimensional appearance, orientation, and tissue preparation, which consists of dividing the specimen and applying tissue dyes. Because the deep and peripheral portions of the tissue are then embedded in the same geometric plane, the map also represents an oriented scale drawing of the resulting histologic sections. The Mohs technician then prepared frozen section slides using standard techniques. The slides were stained with hematoxylin and eosin, and cover slips were applied.  The frozen section slides were then examined under the microscope. If tumor was found, it was localized on the map. The orienting nicks on the original specimen corresponded to similar nicks on the surgical defect so areas of identified tumor could be mapped back to the patient and resected. Additional layers of removed skin were then processed as indicated above. This iterative process continued as applicable until no tumor was observed microscopically. At this stage, the Mohs resection was complete.   Referral for Reconstruction  After the Mohs procedure was completed, the patient was brought back to the minor surgery operatory. As previously arranged, Dr. Mendez will repair the surgical defect.  A pressure dressing composed of Vaseline ointment, Telfa and sterile gauze was securely taped into place.  The patient was given complete verbal and written wound care instructions and was asked to follow up as requested.

## 2023-08-02 NOTE — ASSESSMENT
[FreeTextEntry1] : Mohs surgery for BCC Nasal Supratip -- 5 stage(s) of Mohs surgery performed 08/02/2023 --repair with Dr. Mendez in Plastic Surgery today -- f/u for wound check per Dr. Mendez -- f/u for routine skin exams as previously recommended by Her referring dermatologist.   Thank you for this Mohs surgery referral.   Elmer Winston MD Physician, Dermatology & Dermatologic Surgery [facility]

## 2023-09-07 NOTE — ED ADULT NURSE REASSESSMENT NOTE - ED CARDIAC HEART SOUNDS
ER Provider Note    Scribed for Trent Barahona D.o. by Chidi Loepz. 9/6/2023  6:01 PM    Primary Care Provider: Pcp Pt States None    CHIEF COMPLAINT  Chief Complaint   Patient presents with    Diarrhea     PATIENT STATES DIARRHEA SINCE LAST WEEK AND HAS POPPED IN HER SLEEP. PT STATES SHE HAS HAD X5 BOWEL MOVEMENTS WHILE SLEEPING, STOOL IS LOOSE. PCP COULD NOT SEE PT TILL NEXT WEEK.     Nausea     PT WITH INTERMITTENT NAUSEA.      EXTERNAL RECORDS REVIEWED  Outpatient Notes Seen at urgent care in May for conjunctivitis    HPI/ROS    LIMITATION TO HISTORY   Select: : None    Trevon Brennan is a 55 y.o. female who presents to the ED complaining of diarrhea onset last week. She has had multiple episodes of stool while she was sleeping. She has episodes are 1-2 times a day. She endorses lack of appetite, intermittent cramping abdominal pain and nausea as well. She denies any blood in stool, vomiting, dysuria, flank pain, fevers or chills. She has not been taking medications for her symptoms. She has a history of cholecystectomy and hysterectomy.    PAST MEDICAL HISTORY  Past Medical History:   Diagnosis Date    Bowel habit changes     Depression 2016    anxiety and depression    Heart burn     Indigestion     migraines     Urinary incontinence        SURGICAL HISTORY  Past Surgical History:   Procedure Laterality Date    VAGINAL HYSTERECTOMY SCOPE TOTAL  1/3/2017    Procedure: VAGINAL HYSTERECTOMY SCOPE TOTAL W/BILATERAL SALPINGECTOMY;  Surgeon: Roel Sears M.D.;  Location: SURGERY SAME DAY [facility] ORS;  Service:     ANTERIOR AND POSTERIOR REPAIR  1/3/2017    Procedure: ANTERIOR AND POSTERIOR REPAIR;  Surgeon: Roel Sears M.D.;  Location: SURGERY SAME DAY [facility] ORS;  Service:     ENTEROCELE REPAIR  1/3/2017    Procedure: ENTEROCELE REPAIR With PERINEOPLASTY;  Surgeon: Roel Sears M.D.;  Location: SURGERY SAME DAY [facility] ORS;  Service:     BLADDER SLING FEMALE  1/3/2017    Procedure:  BLADDER SLING FEMALE - TOT;  Surgeon: Roel Sears M.D.;  Location: SURGERY SAME DAY [facility];  Service:     VAGINAL SUSPENSION  1/3/2017    Procedure: SACROSPINOUS VAULT VAGINAL SUSPENSION ;  Surgeon: Roel Sears M.D.;  Location: SURGERY SAME DAY [facility];  Service:     ERCP IN OR  11/18/2012    Performed by Junior Davenport M.D. at SURGERY [facility]    GASTROSCOPY-ENDO  12/21/2011    Performed by DELFINO MICHELE at ENDOSCOPY [facility]    OTHER ABDOMINAL SURGERY      choley    OTHER ABDOMINAL SURGERY      appy       FAMILY HISTORY  History reviewed. No pertinent family history.    SOCIAL HISTORY   reports that she has never smoked. She has never used smokeless tobacco. She reports that she does not currently use alcohol. She reports that she does not currently use drugs.    CURRENT MEDICATIONS  Discharge Medication List as of 9/6/2023  6:19 PM        CONTINUE these medications which have NOT CHANGED    Details   PERSERIS 120 MG Prefilled Syringe WILLIE, Historical Med      simvastatin (ZOCOR) 10 MG Tab Historical Med      busPIRone (BUSPAR) 10 MG Tab tablet Historical Med      docusate sodium (COLACE) 100 MG Cap Historical Med      vitamin D2, Ergocalciferol, (DRISDOL) 1.25 MG (10915 UT) Cap capsule Historical Med      hydrOXYzine HCl (ATARAX) 50 MG Tab Historical Med      LORATADINE-D 24HR  MG per tablet WILLIE, Historical Med      OXcarbazepine (TRILEPTAL) 150 MG Tab Historical Med      polymixin-trimethoprim (POLYTRIM) 74192-6.1 UNIT/ML-% Solution Administer 1 Drop into both eyes every 4 hours., Disp-10 mL, R-0, Normal      ondansetron (ZOFRAN ODT) 4 MG TABLET DISPERSIBLE Take 1 Tablet by mouth every 6 hours as needed for Nausea/Vomiting., Disp-10 Tablet, R-0, Normal      traZODone (DESYREL) 100 MG Tab Take 200 mg by mouth every evening., Historical Med      methylPREDNISolone (MEDROL DOSEPAK) 4 MG Tablet Therapy Pack Use as directed on package. May take all of Day 1 as a  single dose (24 mg) when starting.Medrol DosepakDisp-1 Each, R-0, Normal      polyethylene glycol 3350 (MIRALAX) 17 GM/SCOOP Powder Take 17 g by mouth 1 time a day as needed (constipation)., Disp-510 g, R-0, Normal      Cariprazine HCl (VRAYLAR) 4.5 MG Cap Take 1 capsule by mouth at bglrrzwJ63.32 and F43.12 Please deliver to STEP[facility] today. Thank you.Disp-30 Capsule, R-0, Normal      prazosin (MINIPRESS) 1 MG Cap Take 1 capsule by mouth at bedtimePlease deliver to Step @@ today. Thank youDisp-30 Capsule, R-0, Normal      naltrexone (DEPADE) 50 MG Tab Take 1 Tablet by mouth every morning at 9am.Please deliver to STEP 2 thank you.Disp-30 Tablet, R-0, Normal      fluticasone (FLONASE ALLERGY RELIEF) 50 MCG/ACT nasal spray Spray 1 spray into both nostrils once a dayPlease deliver to STEP2. Thank you.Disp-15.8 mL, R-0, Normal      QUEtiapine (SEROQUEL) 100 MG Tab Take 1 tablet by mouth at bedtimePlease deliver to STEP2. Thank you.Disp-30 Tablet, R-0, Normal      gabapentin (NEURONTIN) 300 MG Cap Take 1 capsule by mouth three times a dayPlease deliver to STEP[facility]. Thank you.Disp-90 Capsule, R-0, Normal      ondansetron (ZOFRAN) 8 MG Tab Take 1 tablet by mouth every 8 hours. Do not take with your escitalopramPlease deliver to STEP2 as soon as possible. Thank you.Disp-6 Tablet, R-0, Normal      potassium chloride SA (KDUR) 20 MEQ Tab CR Take 1 Tablet by mouth every day., Disp-30 Tablet, R-0, Normal      folic acid (FOLVITE) 1 MG Tab Take 1 Tablet by mouth every day., Disp-30 Tablet, R-1, Normal      multivitamin (THERAGRAN) Tab Take 1 Tablet by mouth every day., Disp-30 Tablet, R-1, Normal      guaiFENesin (MUCINEX PO) Take 1 Tablet by mouth 2 times a day as needed (Phlegm)., Historical Med      calcium carbonate (TUMS E-X 750) 750 MG chewable tablet Chew 3-4 Tablets at bedtime as needed (Gastroesophageal Reflux)., Historical Med             ALLERGIES  Erythromycin    PHYSICAL EXAM  /83   Pulse 71   Temp 36.5 °C (97.7 °F)  "(Temporal)   Resp 18   Ht 1.575 m (5' 2\")   Wt 68 kg (150 lb)   LMP 03/01/2009   SpO2 95%   BMI 27.44 kg/m²   General: Well- appearing, non-toxic, no acute distress  HENT: Moist mucous membranes  Neuro: oriented x 3, moving all extremities.   Resp: clear to auscultation, no increased work of breathing  CV: Regular rate and rhythm  Abd: Soft, non-tender, non-distended  Extremities: No peripheral edema  Psych: lucid and conversational    DIAGNOSTIC STUDIES    Labs:   Results for orders placed or performed during the hospital encounter of 09/06/23   CBC WITH DIFFERENTIAL   Result Value Ref Range    WBC 4.6 (L) 4.8 - 10.8 K/uL    RBC 4.63 4.20 - 5.40 M/uL    Hemoglobin 12.6 12.0 - 16.0 g/dL    Hematocrit 37.5 37.0 - 47.0 %    MCV 81.0 (L) 81.4 - 97.8 fL    MCH 27.2 27.0 - 33.0 pg    MCHC 33.6 32.2 - 35.5 g/dL    RDW 42.4 35.9 - 50.0 fL    Platelet Count 206 164 - 446 K/uL    MPV 8.9 (L) 9.0 - 12.9 fL    Neutrophils-Polys 58.80 44.00 - 72.00 %    Lymphocytes 29.90 22.00 - 41.00 %    Monocytes 9.00 0.00 - 13.40 %    Eosinophils 1.70 0.00 - 6.90 %    Basophils 0.40 0.00 - 1.80 %    Immature Granulocytes 0.20 0.00 - 0.90 %    Nucleated RBC 0.00 0.00 - 0.20 /100 WBC    Neutrophils (Absolute) 2.69 1.82 - 7.42 K/uL    Lymphs (Absolute) 1.37 1.00 - 4.80 K/uL    Monos (Absolute) 0.41 0.00 - 0.85 K/uL    Eos (Absolute) 0.08 0.00 - 0.51 K/uL    Baso (Absolute) 0.02 0.00 - 0.12 K/uL    Immature Granulocytes (abs) 0.01 0.00 - 0.11 K/uL    NRBC (Absolute) 0.00 K/uL   COMP METABOLIC PANEL   Result Value Ref Range    Sodium 133 (L) 135 - 145 mmol/L    Potassium 4.3 3.6 - 5.5 mmol/L    Chloride 95 (L) 96 - 112 mmol/L    Co2 27 20 - 33 mmol/L    Anion Gap 11.0 7.0 - 16.0    Glucose 91 65 - 99 mg/dL    Bun 8 8 - 22 mg/dL    Creatinine 0.79 0.50 - 1.40 mg/dL    Calcium 9.9 8.5 - 10.5 mg/dL    Correct Calcium 9.4 8.5 - 10.5 mg/dL    AST(SGOT) 27 12 - 45 U/L    ALT(SGPT) 22 2 - 50 U/L    Alkaline Phosphatase 103 (H) 30 - 99 U/L    " Total Bilirubin 0.3 0.1 - 1.5 mg/dL    Albumin 4.6 3.2 - 4.9 g/dL    Total Protein 6.8 6.0 - 8.2 g/dL    Globulin 2.2 1.9 - 3.5 g/dL    A-G Ratio 2.1 g/dL   LIPASE   Result Value Ref Range    Lipase 29 11 - 82 U/L   HCG QUAL SERUM   Result Value Ref Range    Beta-Hcg Qualitative Serum Negative Negative   ESTIMATED GFR   Result Value Ref Range    GFR (CKD-EPI) 88 >60 mL/min/1.73 m 2      All labs reviewed by me.     COURSE & MEDICAL DECISION MAKING     ED Observation Status? No; Patient does not meet criteria for ED Observation.     INITIAL ASSESSMENT, COURSE AND PLAN  Care Narrative:     6:06 PM -this is a 55-year-old female with past medical history as described above presenting to the emergency department for diarrhea for the last 5 days.  Patient is well-appearing with stable vital signs, well-hydrated on exam.  She is only producing 1-2 stools daily.  Her labs revealed normal CBC, chemistry is unremarkable, electrolytes and renal function are normal.  She does not have a fever and she has not had any hematochezia.  Her abdominal exam is benign.  No indication for CT imaging of her abdomen at this time.  She is tolerating p.o., not requiring IV hydration at this time.  Advised on strict return precautions, educated on maintaining hydration status, typical course of illness of viral diarrhea/gastroenteritis.  Patient amenable with the above plan.  Appropriate for discharge, return precaution discussed.      DISPOSITION AND DISCUSSIONS    Discussion of management with other hospitals or appropriate source(s): None     Escalation of care considered, and ultimately not performed: diagnostic imaging.  No indication for CT scan of the abdomen given benign abdominal exam, stable vital signs, overall well appearance of the patient    Barriers to care at this time, including but not limited to: Patient does not have established PCP.     DISPOSITION:  Patient will be discharged home in stable condition.    FOLLOW UP:  No  follow-up provider specified.    FINAL DIAGNOSIS  1. Diarrhea of infectious origin    2. Mild dehydration      Chidi FORBES (Jenniferibdavid), am scribing for, and in the presence of, Trent Barahona D.O..    Electronically signed by: Chidi Lopez (Jenniferibdavid), 9/6/2023    Trent FORBES D.O. personally performed the services described in this documentation, as scribed by Chidi Lopez in my presence, and it is both accurate and complete.      The note accurately reflects work and decisions made by me.  Trent Barahona D.O.  9/6/2023  7:56 PM     normal S1, S2 heard

## 2023-09-27 ENCOUNTER — OFFICE (OUTPATIENT)
Dept: URBAN - METROPOLITAN AREA CLINIC 115 | Facility: CLINIC | Age: 88
Setting detail: OPHTHALMOLOGY
End: 2023-09-27
Payer: MEDICARE

## 2023-09-27 DIAGNOSIS — H35.033: ICD-10-CM

## 2023-09-27 DIAGNOSIS — H04.122: ICD-10-CM

## 2023-09-27 DIAGNOSIS — H53.433: ICD-10-CM

## 2023-09-27 DIAGNOSIS — H26.492: ICD-10-CM

## 2023-09-27 DIAGNOSIS — H26.491: ICD-10-CM

## 2023-09-27 DIAGNOSIS — H47.211: ICD-10-CM

## 2023-09-27 DIAGNOSIS — H01.004: ICD-10-CM

## 2023-09-27 DIAGNOSIS — H01.001: ICD-10-CM

## 2023-09-27 DIAGNOSIS — H35.54: ICD-10-CM

## 2023-09-27 DIAGNOSIS — H43.813: ICD-10-CM

## 2023-09-27 DIAGNOSIS — H35.40: ICD-10-CM

## 2023-09-27 DIAGNOSIS — H40.1132: ICD-10-CM

## 2023-09-27 PROCEDURE — 92133 CPTRZD OPH DX IMG PST SGM ON: CPT | Performed by: OPHTHALMOLOGY

## 2023-09-27 PROCEDURE — 92014 COMPRE OPH EXAM EST PT 1/>: CPT | Performed by: OPHTHALMOLOGY

## 2023-09-27 PROCEDURE — 92083 EXTENDED VISUAL FIELD XM: CPT | Performed by: OPHTHALMOLOGY

## 2023-09-27 ASSESSMENT — REFRACTION_CURRENTRX
OD_SPHERE: -0.25
OS_SPHERE: +1.00
OD_VPRISM_DIRECTION: SV
OS_AXIS: 123
OS_VPRISM_DIRECTION: SV
OS_OVR_VA: 20/
OD_OVR_VA: 20/
OS_CYLINDER: -0.50

## 2023-09-27 ASSESSMENT — LID EXAM ASSESSMENTS
OS_BLEPHARITIS: LUL T
OD_BLEPHARITIS: RUL T

## 2023-09-27 ASSESSMENT — PACHYMETRY
OD_CT_UM: 492
OS_CT_CORRECTION: 5
OS_CT_UM: 472
OD_CT_CORRECTION: 4

## 2023-09-27 ASSESSMENT — REFRACTION_AUTOREFRACTION
OD_AXIS: 009
OS_SPHERE: +0.25
OD_CYLINDER: -1.75
OS_AXIS: 168
OS_CYLINDER: -1.75
OD_SPHERE: -0.50

## 2023-09-27 ASSESSMENT — REFRACTION_MANIFEST
OS_VA1: 20/40
OD_SPHERE: -0.50
OS_CYLINDER: -2.00
OD_AXIS: 015
OD_ADD: +2.25
OS_CYLINDER: -2.50
OS_ADD: +2.25
OS_CYLINDER: -0.50
OS_AXIS: 165
OD_VA1: 20/25-
OS_AXIS: 160
OD_SPHERE: -0.25
OS_AXIS: 125
OS_SPHERE: PLANO
OD_ADD: +2.50
OD_CYLINDER: -1.50
OS_SPHERE: -1.25
OS_VA1: 20/25-
OS_SPHERE: PLANO
OS_ADD: +2.50

## 2023-09-27 ASSESSMENT — LID POSITION - ECTROPION
OS_ECTROPION: LLL 2+
OD_ECTROPION: RLL 1+

## 2023-09-27 ASSESSMENT — VISUAL ACUITY
OD_BCVA: 20/70
OS_BCVA: 20/80

## 2023-09-27 ASSESSMENT — TONOMETRY
OD_IOP_MMHG: 12
OD_IOP_MMHG: 11
OS_IOP_MMHG: 12

## 2023-09-27 ASSESSMENT — SPHEQUIV_DERIVED
OS_SPHEQUIV: -0.625
OD_SPHEQUIV: -1.375
OS_SPHEQUIV: -1.5
OD_SPHEQUIV: -1.25

## 2023-09-27 ASSESSMENT — CONFRONTATIONAL VISUAL FIELD TEST (CVF)
OD_FINDINGS: FULL
OS_FINDINGS: FULL

## 2023-09-28 NOTE — PHYSICAL THERAPY INITIAL EVALUATION ADULT - REHAB POTENTIAL, PT EVAL
Patient calling for status update on Form Request and wanting call back as soon as ready for    good, to achieve stated therapy goals

## 2024-01-11 NOTE — ED ADULT TRIAGE NOTE - NS ED TRIAGE AVPU SCALE
Alert-The patient is alert, awake and responds to voice. The patient is oriented to time, place, and person. The triage nurse is able to obtain subjective information. MEDICATIONS  (STANDING):  apixaban 5 milliGRAM(s) Oral two times a day  aspirin enteric coated 81 milliGRAM(s) Oral daily  atorvastatin 40 milliGRAM(s) Oral at bedtime  carBAMazepine ER Tablet 100 milliGRAM(s) Oral two times a day  furosemide   Injectable 40 milliGRAM(s) IV Push every 12 hours  levothyroxine 88 MICROGram(s) Oral daily  losartan 12.5 milliGRAM(s) Oral two times a day  metoprolol succinate ER 25 milliGRAM(s) Oral at bedtime  pregabalin 400 milliGRAM(s) Oral two times a day  spironolactone 25 milliGRAM(s) Oral every 12 hours    MEDICATIONS  (PRN):  acetaminophen     Tablet .. 650 milliGRAM(s) Oral every 6 hours PRN Temp greater or equal to 38C (100.4F), Mild Pain (1 - 3)  melatonin 3 milliGRAM(s) Oral at bedtime PRN Insomnia

## 2024-02-03 ENCOUNTER — EMERGENCY (EMERGENCY)
Facility: HOSPITAL | Age: 89
LOS: 1 days | Discharge: HOME | End: 2024-02-03
Attending: EMERGENCY MEDICINE
Payer: MEDICARE

## 2024-02-03 VITALS
OXYGEN SATURATION: 96 % | HEART RATE: 69 BPM | DIASTOLIC BLOOD PRESSURE: 85 MMHG | RESPIRATION RATE: 16 BRPM | SYSTOLIC BLOOD PRESSURE: 147 MMHG | HEIGHT: 58 IN | TEMPERATURE: 98 F | WEIGHT: 160.06 LBS

## 2024-02-03 VITALS
HEART RATE: 92 BPM | DIASTOLIC BLOOD PRESSURE: 76 MMHG | RESPIRATION RATE: 17 BRPM | TEMPERATURE: 97 F | OXYGEN SATURATION: 98 % | SYSTOLIC BLOOD PRESSURE: 132 MMHG

## 2024-02-03 DIAGNOSIS — Z98.49 CATARACT EXTRACTION STATUS, UNSPECIFIED EYE: Chronic | ICD-10-CM

## 2024-02-03 DIAGNOSIS — Z90.49 ACQUIRED ABSENCE OF OTHER SPECIFIED PARTS OF DIGESTIVE TRACT: Chronic | ICD-10-CM

## 2024-02-03 DIAGNOSIS — Z96.651 PRESENCE OF RIGHT ARTIFICIAL KNEE JOINT: Chronic | ICD-10-CM

## 2024-02-03 DIAGNOSIS — Z98.891 HISTORY OF UTERINE SCAR FROM PREVIOUS SURGERY: Chronic | ICD-10-CM

## 2024-02-03 LAB
ALBUMIN SERPL ELPH-MCNC: 4 G/DL — SIGNIFICANT CHANGE UP (ref 3.3–5.2)
ALP SERPL-CCNC: 71 U/L — SIGNIFICANT CHANGE UP (ref 40–120)
ALT FLD-CCNC: 10 U/L — SIGNIFICANT CHANGE UP
ANION GAP SERPL CALC-SCNC: 11 MMOL/L — SIGNIFICANT CHANGE UP (ref 5–17)
APPEARANCE UR: CLEAR — SIGNIFICANT CHANGE UP
AST SERPL-CCNC: 29 U/L — SIGNIFICANT CHANGE UP
BACTERIA # UR AUTO: NEGATIVE /HPF — SIGNIFICANT CHANGE UP
BASOPHILS # BLD AUTO: 0.07 K/UL — SIGNIFICANT CHANGE UP (ref 0–0.2)
BASOPHILS NFR BLD AUTO: 0.9 % — SIGNIFICANT CHANGE UP (ref 0–2)
BILIRUB SERPL-MCNC: 0.6 MG/DL — SIGNIFICANT CHANGE UP (ref 0.4–2)
BILIRUB UR-MCNC: NEGATIVE — SIGNIFICANT CHANGE UP
BUN SERPL-MCNC: 6.9 MG/DL — LOW (ref 8–20)
CALCIUM SERPL-MCNC: 9.1 MG/DL — SIGNIFICANT CHANGE UP (ref 8.4–10.5)
CAST: 2 /LPF — SIGNIFICANT CHANGE UP (ref 0–4)
CHLORIDE SERPL-SCNC: 100 MMOL/L — SIGNIFICANT CHANGE UP (ref 96–108)
CO2 SERPL-SCNC: 24 MMOL/L — SIGNIFICANT CHANGE UP (ref 22–29)
COLOR SPEC: YELLOW — SIGNIFICANT CHANGE UP
CREAT SERPL-MCNC: 0.37 MG/DL — LOW (ref 0.5–1.3)
DIFF PNL FLD: ABNORMAL
EGFR: 96 ML/MIN/1.73M2 — SIGNIFICANT CHANGE UP
EOSINOPHIL # BLD AUTO: 0.09 K/UL — SIGNIFICANT CHANGE UP (ref 0–0.5)
EOSINOPHIL NFR BLD AUTO: 1.2 % — SIGNIFICANT CHANGE UP (ref 0–6)
GLUCOSE SERPL-MCNC: 94 MG/DL — SIGNIFICANT CHANGE UP (ref 70–99)
GLUCOSE UR QL: NEGATIVE MG/DL — SIGNIFICANT CHANGE UP
HCT VFR BLD CALC: 45.2 % — HIGH (ref 34.5–45)
HGB BLD-MCNC: 15.4 G/DL — SIGNIFICANT CHANGE UP (ref 11.5–15.5)
IMM GRANULOCYTES NFR BLD AUTO: 0.4 % — SIGNIFICANT CHANGE UP (ref 0–0.9)
KETONES UR-MCNC: NEGATIVE MG/DL — SIGNIFICANT CHANGE UP
LACTATE BLDV-MCNC: 1.1 MMOL/L — SIGNIFICANT CHANGE UP (ref 0.5–2)
LEUKOCYTE ESTERASE UR-ACNC: ABNORMAL
LIDOCAIN IGE QN: 21 U/L — LOW (ref 22–51)
LYMPHOCYTES # BLD AUTO: 1.44 K/UL — SIGNIFICANT CHANGE UP (ref 1–3.3)
LYMPHOCYTES # BLD AUTO: 18.9 % — SIGNIFICANT CHANGE UP (ref 13–44)
MCHC RBC-ENTMCNC: 30.5 PG — SIGNIFICANT CHANGE UP (ref 27–34)
MCHC RBC-ENTMCNC: 34.1 GM/DL — SIGNIFICANT CHANGE UP (ref 32–36)
MCV RBC AUTO: 89.5 FL — SIGNIFICANT CHANGE UP (ref 80–100)
MONOCYTES # BLD AUTO: 0.71 K/UL — SIGNIFICANT CHANGE UP (ref 0–0.9)
MONOCYTES NFR BLD AUTO: 9.3 % — SIGNIFICANT CHANGE UP (ref 2–14)
NEUTROPHILS # BLD AUTO: 5.29 K/UL — SIGNIFICANT CHANGE UP (ref 1.8–7.4)
NEUTROPHILS NFR BLD AUTO: 69.3 % — SIGNIFICANT CHANGE UP (ref 43–77)
NITRITE UR-MCNC: POSITIVE
PH UR: 7 — SIGNIFICANT CHANGE UP (ref 5–8)
PLATELET # BLD AUTO: 255 K/UL — SIGNIFICANT CHANGE UP (ref 150–400)
POTASSIUM SERPL-MCNC: 5.2 MMOL/L — SIGNIFICANT CHANGE UP (ref 3.5–5.3)
POTASSIUM SERPL-SCNC: 5.2 MMOL/L — SIGNIFICANT CHANGE UP (ref 3.5–5.3)
PROT SERPL-MCNC: 7.5 G/DL — SIGNIFICANT CHANGE UP (ref 6.6–8.7)
PROT UR-MCNC: SIGNIFICANT CHANGE UP MG/DL
RBC # BLD: 5.05 M/UL — SIGNIFICANT CHANGE UP (ref 3.8–5.2)
RBC # FLD: 13.2 % — SIGNIFICANT CHANGE UP (ref 10.3–14.5)
RBC CASTS # UR COMP ASSIST: 0 /HPF — SIGNIFICANT CHANGE UP (ref 0–4)
SODIUM SERPL-SCNC: 135 MMOL/L — SIGNIFICANT CHANGE UP (ref 135–145)
SP GR SPEC: 1.01 — SIGNIFICANT CHANGE UP (ref 1–1.03)
SQUAMOUS # UR AUTO: 4 /HPF — SIGNIFICANT CHANGE UP (ref 0–5)
UROBILINOGEN FLD QL: 0.2 MG/DL — SIGNIFICANT CHANGE UP (ref 0.2–1)
WBC # BLD: 7.63 K/UL — SIGNIFICANT CHANGE UP (ref 3.8–10.5)
WBC # FLD AUTO: 7.63 K/UL — SIGNIFICANT CHANGE UP (ref 3.8–10.5)
WBC UR QL: 58 /HPF — HIGH (ref 0–5)

## 2024-02-03 PROCEDURE — 96374 THER/PROPH/DIAG INJ IV PUSH: CPT | Mod: XU

## 2024-02-03 PROCEDURE — G1004: CPT

## 2024-02-03 PROCEDURE — 87086 URINE CULTURE/COLONY COUNT: CPT

## 2024-02-03 PROCEDURE — 81001 URINALYSIS AUTO W/SCOPE: CPT

## 2024-02-03 PROCEDURE — 74177 CT ABD & PELVIS W/CONTRAST: CPT | Mod: 26,ME

## 2024-02-03 PROCEDURE — 80053 COMPREHEN METABOLIC PANEL: CPT

## 2024-02-03 PROCEDURE — 71045 X-RAY EXAM CHEST 1 VIEW: CPT | Mod: 26

## 2024-02-03 PROCEDURE — 74177 CT ABD & PELVIS W/CONTRAST: CPT | Mod: ME

## 2024-02-03 PROCEDURE — 83690 ASSAY OF LIPASE: CPT

## 2024-02-03 PROCEDURE — 83605 ASSAY OF LACTIC ACID: CPT

## 2024-02-03 PROCEDURE — 71045 X-RAY EXAM CHEST 1 VIEW: CPT

## 2024-02-03 PROCEDURE — 99285 EMERGENCY DEPT VISIT HI MDM: CPT

## 2024-02-03 PROCEDURE — 85025 COMPLETE CBC W/AUTO DIFF WBC: CPT

## 2024-02-03 PROCEDURE — 36415 COLL VENOUS BLD VENIPUNCTURE: CPT

## 2024-02-03 PROCEDURE — 96375 TX/PRO/DX INJ NEW DRUG ADDON: CPT

## 2024-02-03 PROCEDURE — 99284 EMERGENCY DEPT VISIT MOD MDM: CPT | Mod: 25

## 2024-02-03 RX ORDER — ONDANSETRON 8 MG/1
4 TABLET, FILM COATED ORAL ONCE
Refills: 0 | Status: COMPLETED | OUTPATIENT
Start: 2024-02-03 | End: 2024-02-03

## 2024-02-03 RX ORDER — CEFTRIAXONE 500 MG/1
1000 INJECTION, POWDER, FOR SOLUTION INTRAMUSCULAR; INTRAVENOUS ONCE
Refills: 0 | Status: COMPLETED | OUTPATIENT
Start: 2024-02-03 | End: 2024-02-03

## 2024-02-03 RX ORDER — SODIUM CHLORIDE 9 MG/ML
1000 INJECTION INTRAMUSCULAR; INTRAVENOUS; SUBCUTANEOUS ONCE
Refills: 0 | Status: COMPLETED | OUTPATIENT
Start: 2024-02-03 | End: 2024-02-03

## 2024-02-03 RX ORDER — MORPHINE SULFATE 50 MG/1
2 CAPSULE, EXTENDED RELEASE ORAL ONCE
Refills: 0 | Status: DISCONTINUED | OUTPATIENT
Start: 2024-02-03 | End: 2024-02-03

## 2024-02-03 RX ORDER — CEFTRIAXONE 500 MG/1
1000 INJECTION, POWDER, FOR SOLUTION INTRAMUSCULAR; INTRAVENOUS ONCE
Refills: 0 | Status: DISCONTINUED | OUTPATIENT
Start: 2024-02-03 | End: 2024-02-03

## 2024-02-03 RX ORDER — FAMOTIDINE 10 MG/ML
20 INJECTION INTRAVENOUS ONCE
Refills: 0 | Status: COMPLETED | OUTPATIENT
Start: 2024-02-03 | End: 2024-02-03

## 2024-02-03 RX ORDER — CEFPODOXIME PROXETIL 100 MG
1 TABLET ORAL
Qty: 20 | Refills: 0
Start: 2024-02-03 | End: 2024-02-12

## 2024-02-03 RX ORDER — IOHEXOL 300 MG/ML
30 INJECTION, SOLUTION INTRAVENOUS ONCE
Refills: 0 | Status: COMPLETED | OUTPATIENT
Start: 2024-02-03 | End: 2024-02-03

## 2024-02-03 RX ADMIN — CEFTRIAXONE 1000 MILLIGRAM(S): 500 INJECTION, POWDER, FOR SOLUTION INTRAMUSCULAR; INTRAVENOUS at 19:28

## 2024-02-03 RX ADMIN — FAMOTIDINE 20 MILLIGRAM(S): 10 INJECTION INTRAVENOUS at 16:15

## 2024-02-03 RX ADMIN — IOHEXOL 30 MILLILITER(S): 300 INJECTION, SOLUTION INTRAVENOUS at 16:28

## 2024-02-03 RX ADMIN — ONDANSETRON 4 MILLIGRAM(S): 8 TABLET, FILM COATED ORAL at 16:14

## 2024-02-03 RX ADMIN — SODIUM CHLORIDE 1000 MILLILITER(S): 9 INJECTION INTRAMUSCULAR; INTRAVENOUS; SUBCUTANEOUS at 16:16

## 2024-02-03 RX ADMIN — MORPHINE SULFATE 2 MILLIGRAM(S): 50 CAPSULE, EXTENDED RELEASE ORAL at 21:45

## 2024-02-03 NOTE — ED PROVIDER NOTE - CARE PROVIDER_API CALL
Christopher Sy  Gastroenterology  [address], [address]  [place] [address]  Phone: [phone]  Fax: [phone]  Follow Up Time: 1-3 Days

## 2024-02-03 NOTE — ED ADULT NURSE NOTE - OBJECTIVE STATEMENT
Patient is alert and oriented X4 from assisted living. Patient BIBA for left sided abdominal pain since last night. Patient states she has been tolerating po. Patient denies any n/v/d, fever, chills, urinary symptoms at this time.

## 2024-02-03 NOTE — ED ADULT NURSE NOTE - NSFALLRISKINTERV_ED_ALL_ED

## 2024-02-03 NOTE — ED PROVIDER NOTE - OBJECTIVE STATEMENT
89yoF; with PMH signif for HTN, HLD, Hypothyroidism, Anxiety, Neurofibromas; now p/w abd pain--left flank, radiating to LLQ, cramping, intermittent, x2-3 days, with nausea. reports flatus today, but no BM x1 week. denies f/c/s. denies cp/sob/palp. denies cough. denies hematuria, dysuria, frequency, urgency.  PMH: HTN, HLD, Hypothyroidism, Anxiety, Neurofibromas;

## 2024-02-03 NOTE — ED PROVIDER NOTE - PATIENT PORTAL LINK FT
You can access the FollowMyHealth Patient Portal offered by [facility] by registering at the following website: [email or web address]. By joining [employer]’s FollowMyHealth portal, you will also be able to view your health information using other applications (apps) compatible with our system.

## 2024-02-03 NOTE — ED PROVIDER NOTE - NS ED ROS FT
Constitutional: (-) fever  (-)chills  (-)sweats  Eyes/ENT: (-)   Cardiovascular: (-) chest pain, (-) palpitations (-) edema   Respiratory: (-) cough, (-) shortness of breath   Gastrointestinal: (+nausea  (-)vomiting, (-) diarrhea  (+) abdominal pain   :  (-)dysuria, (-)frequency, (-)urgency, (-)hematuria  Musculoskeletal: (-) neck pain, (+) back pain, (-) joint pain  Integumentary: (-) rash, (-) edema  Neurological: (-) headache, (-) altered mental status  (-)LOC

## 2024-02-03 NOTE — ED PROVIDER NOTE - NSFOLLOWUPINSTRUCTIONS_ED_ALL_ED_FT
You are advised to please follow up with your primary care doctor within the next 24 hours and return to the Emergency Department for worsening symptoms or any other concerns.  Your doctor may call [phone] to follow up on the specific results of the tests performed today in the emergency department.    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  Mildly increased size of hypervascular small bowel lesion in the anterior abdomen measuring 1.6 x 2.8 cm, previously 1.1 x 2.8 cm, possibly related to patient's known neurofibromatosis.".  PLEASE FOLLOW UP WITH YOUR PMD AND GI FOR THESE FINDINGS.     Urinary Tract Infection    A urinary tract infection (UTI) is an infection of any part of the urinary tract, which includes the kidneys, ureters, bladder, and urethra. Risk factors include ignoring your need to urinate, wiping back to front if female, being an uncircumcised male, and having diabetes or a weak immune system. Symptoms include frequent urination, pain or burning with urination, foul smelling urine, cloudy urine, pain in the lower abdomen, blood in the urine, and fever. If you were prescribed an antibiotic medicine, take it as told by your health care provider. Do not stop taking the antibiotic even if you start to feel better.    SEEK IMMEDIATE MEDICAL CARE IF YOU HAVE ANY OF THE FOLLOWING SYMPTOMS: severe back or abdominal pain, fever, inability to keep fluids or medicine down, dizziness/lightheadedness, or a change in mental status.

## 2024-02-03 NOTE — ED PROVIDER NOTE - PHYSICAL EXAMINATION
General:     NAD  Head:     NC/AT, EOMI, oral mucosa moist  Neck:     trachea midline  Lungs:     CTA b/l, no w/r/r  CVS:     S1S2, RRR, no m/g/r  Abd:     +BS, s/nd, no organomegaly.  +left flank pain, +LLQ ttp   Ext:    mckeon x4  Neuro: grossly intact General:     NAD  Head:     NC/AT, EOMI, oral mucosa moist  Neck:     trachea midline  Lungs:     CTA b/l, no w/r/r  CVS:     S1S2, RRR, no m/g/r  Abd:     +BS, s/nd, no organomegaly.  +left flank pain, +LLQ ttp   BACK: erythematous patch 1cm x4cm over left inferior scapular area (states itchy, not painful)  Ext:    mckeon x4  Neuro: grossly intact

## 2024-02-03 NOTE — ED PROVIDER NOTE - CLINICAL SUMMARY MEDICAL DECISION MAKING FREE TEXT BOX
89yoF; with PMH signif for HTN, HLD, Hypothyroidism, Anxiety, Neurofibromas; now p/w abd pain--left flank, radiating to LLQ, cramping, intermittent, x2-3 days, with nausea. reports flatus today, but no BM x1 week.  evaluated for sbo and ct with no acute sbo.  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  Mildly increased size of hypervascular small bowel lesion in the anterior abdomen measuring 1.6 x 2.8 cm, previously 1.1 x 2.8 cm, possibly related to patient's known neurofibromatosis.".  Discussed that lesion on back may be early shingles, but still too early to tell.  Advised to continue monitoring--not a classical vesicular lesion.  Discussed findings with patient and daughter.

## 2024-02-03 NOTE — ED ADULT TRIAGE NOTE - CHIEF COMPLAINT QUOTE
pt BIBA for left sided abdominal pain since last night. tolerating po. denies n/v/d, fever, chills, urinary sx

## 2024-02-04 LAB
CULTURE RESULTS: SIGNIFICANT CHANGE UP
SPECIMEN SOURCE: SIGNIFICANT CHANGE UP

## 2024-03-12 ENCOUNTER — APPOINTMENT (OUTPATIENT)
Dept: GASTROENTEROLOGY | Facility: CLINIC | Age: 89
End: 2024-03-12
Payer: MEDICARE

## 2024-03-12 VITALS
BODY MASS INDEX: 28.13 KG/M2 | RESPIRATION RATE: 14 BRPM | OXYGEN SATURATION: 97 % | SYSTOLIC BLOOD PRESSURE: 120 MMHG | HEART RATE: 70 BPM | DIASTOLIC BLOOD PRESSURE: 86 MMHG | WEIGHT: 134 LBS | HEIGHT: 58 IN

## 2024-03-12 DIAGNOSIS — Z86.79 PERSONAL HISTORY OF OTHER DISEASES OF THE CIRCULATORY SYSTEM: ICD-10-CM

## 2024-03-12 DIAGNOSIS — Z86.39 PERSONAL HISTORY OF OTHER ENDOCRINE, NUTRITIONAL AND METABOLIC DISEASE: ICD-10-CM

## 2024-03-12 DIAGNOSIS — Q85.00 NEUROFIBROMATOSIS, UNSPECIFIED: ICD-10-CM

## 2024-03-12 PROCEDURE — 99213 OFFICE O/P EST LOW 20 MIN: CPT

## 2024-03-12 RX ORDER — FOLIC ACID 1 MG/1
1 TABLET ORAL
Qty: 90 | Refills: 0 | Status: DISCONTINUED | COMMUNITY
Start: 2021-12-13 | End: 2024-03-12

## 2024-03-12 RX ORDER — VALACYCLOVIR 1 G/1
1 TABLET, FILM COATED ORAL
Refills: 0 | Status: ACTIVE | COMMUNITY

## 2024-03-12 RX ORDER — CHOLESTYRAMINE 4 G/9G
4 POWDER, FOR SUSPENSION ORAL
Refills: 0 | Status: ACTIVE | COMMUNITY

## 2024-03-12 RX ORDER — HYDROCORTISONE 25 MG/G
2.5 CREAM TOPICAL
Qty: 28 | Refills: 4 | Status: DISCONTINUED | COMMUNITY
Start: 2022-11-10 | End: 2024-03-12

## 2024-03-12 RX ORDER — BRIMONIDINE TARTRATE 2 MG/MG
0.2 SOLUTION/ DROPS OPHTHALMIC
Qty: 15 | Refills: 0 | Status: DISCONTINUED | COMMUNITY
Start: 2021-11-17 | End: 2024-03-12

## 2024-03-12 RX ORDER — POLYETHYLENE GLYCOL-3350 AND ELECTROLYTES WITH FLAVOR PACK 240; 5.84; 2.98; 6.72; 22.72 G/278.26G; G/278.26G; G/278.26G; G/278.26G; G/278.26G
240 POWDER, FOR SOLUTION ORAL
Qty: 1 | Refills: 0 | Status: DISCONTINUED | COMMUNITY
Start: 2022-09-28 | End: 2024-03-12

## 2024-03-12 RX ORDER — DULOXETINE HYDROCHLORIDE 60 MG/1
60 CAPSULE, DELAYED RELEASE PELLETS ORAL
Qty: 90 | Refills: 2 | Status: DISCONTINUED | COMMUNITY
Start: 2020-01-15 | End: 2024-03-12

## 2024-03-12 RX ORDER — LATANOPROST/PF 0.005 %
0.01 DROPS OPHTHALMIC (EYE)
Refills: 0 | Status: DISCONTINUED | COMMUNITY
End: 2024-03-12

## 2024-03-12 RX ORDER — GABAPENTIN 100 MG
100 TABLET ORAL
Refills: 0 | Status: ACTIVE | COMMUNITY

## 2024-03-12 RX ORDER — FAMOTIDINE 10 MG/1
10 TABLET, FILM COATED ORAL
Refills: 0 | Status: DISCONTINUED | COMMUNITY
End: 2024-03-12

## 2024-08-13 ENCOUNTER — APPOINTMENT (OUTPATIENT)
Dept: GASTROENTEROLOGY | Facility: CLINIC | Age: 89
End: 2024-08-13

## 2024-11-06 NOTE — ED ADULT NURSE NOTE - DRUG PRE-SCREENING (DAST -1)
"[facility] Dermatology Clinic Note     Patient Name: Jacek Valles  Encounter Date: 11/6/2024     Have you been cared for by a [facility] Dermatologist in the last 3 years and, if so, which description applies to you?    Yes.  I have been here within the last 3 years, and my medical history has NOT changed since that time.  I am MALE/not capable of bearing children.    REVIEW OF SYSTEMS:  Have you recently had or currently have any of the following? No changes in my recent health.   PAST MEDICAL HISTORY:  Have you personally ever had or currently have any of the following?  If \"YES,\" then please provide more detail. No changes in my medical history.   HISTORY OF IMMUNOSUPPRESSION: Do you have a history of any of the following:  Systemic Immunosuppression such as Diabetes, Biologic or Immunotherapy, Chemotherapy, Organ Transplantation, Bone Marrow Transplantation or Prednisone?  YES, pre diabetic      Answering \"YES\" requires the addition of the dotphrase \"IMMUNOSUPPRESSED\" as the first diagnosis of the patient's visit.   FAMILY HISTORY:  Any \"first degree relatives\" (parent, brother, sister, or child) with the following?    No changes in my family's known health.   PATIENT EXPERIENCE:    Do you want the Dermatologist to perform a COMPLETE skin exam today including a clinical examination under the \"bra and underwear\" areas?  Yes  If necessary, do we have your permission to call and leave a detailed message on your Preferred Phone number that includes your specific medical information?  Yes      Allergies   Allergen Reactions    Penicillins Other (See Comments)      Ancef can tolerate      Current Outpatient Medications:     ACCU-CHEK FASTCLIX LANCETS MISC, by Does not apply route daily Dx: E11.40, Disp: 100 each, Rfl: 1    acetaminophen (TYLENOL) 500 mg tablet, Take 500 mg by mouth every 6 (six) hours as needed for mild pain, Disp: , Rfl:     Empagliflozin (Jardiance) 25 MG TABS, Take 1 tablet (25 mg total) by " mouth every morning, Disp: 90 tablet, Rfl: 1    fluticasone (FLONASE) 50 mcg/act nasal spray, 1 spray into each nostril daily, Disp: 48 g, Rfl: 1    glucose blood (ACCU-CHEK GUIDE) test strip, 1 each by Other route daily DX: e11.40, Disp: 100 each, Rfl: 1    lisinopril (ZESTRIL) 10 mg tablet, Take 1 tablet (10 mg total) by mouth daily, Disp: 100 tablet, Rfl: 1    metFORMIN (GLUCOPHAGE-XR) 500 mg 24 hr tablet, Take 4 tablets (2,000 mg total) by mouth daily with breakfast, Disp: 360 tablet, Rfl: 1    Mounjaro 2.5 MG/0.5ML, Inject 0.5 mL (2.5 mg total) under the skin once a week, Disp: 6 mL, Rfl: 3    omeprazole (PriLOSEC) 20 mg delayed release capsule, Take 1 capsule (20 mg total) by mouth as needed (heartburn), Disp: 90 capsule, Rfl: 1    rosuvastatin (CRESTOR) 20 MG tablet, Take 1 tablet (20 mg total) by mouth daily, Disp: 100 tablet, Rfl: 3    tamsulosin (FLOMAX) 0.4 mg, Take 1 capsule (0.4 mg total) by mouth daily with dinner, Disp: 100 capsule, Rfl: 1    vitamin B-12 (VITAMIN B-12) 1,000 mcg tablet, Take 1 tablet (1,000 mcg total) by mouth daily, Disp: , Rfl:           Whom besides the patient is providing clinical information about today's encounter?   NO ADDITIONAL HISTORIAN (patient alone provided history)    Physical Exam and Assessment/Plan by Diagnosis:    HISTORY OF SQUAMOUS CELL CARCINOMA IN SITU    Physical Exam:  Anatomic Location Affected:  right forearm  Morphological Description of Scar:  well healed scar  Suspected Recurrence: no  Regional adenopathy: no    Additional History of Present Condition:  Patient treated in September, 2020 for scc in situ via ED&C by Dr. Caldwell.     Assessment and Plan:  Based on a thorough discussion of this condition and the management approach to it (including a comprehensive discussion of the known risks, side effects and potential benefits of treatment), the patient (family) agrees to implement the following specific plan:  Continue yearly skin checks   Apply sunscreen  SPF 30 or higher, reapply every two hours. Wear sun protective clothing, long sleeve shirts, wide brimmed hats, and sunglasses.     How can SCC be prevented?  The most important way to prevent SCC is to avoid sunburn. This is especially important in childhood and early life. Fair skinned individuals and those with a personal or family history of BCC should protect their skin from sun exposure daily, year-round and lifelong.  Stay indoors or under the shade in the middle of the day   Wear covering clothing   Apply high protection factor SPF50+ broad-spectrum sunscreens generously to exposed skin if outdoors   Avoid indoor tanning (sun beds, solaria)      What is the outlook for SCC?  Most SCC are cured by treatment. Cure is most likely if treatment is undertaken when the lesion is small. A small percent of SCC's can spread to lymph  nodes and long term monitoring is indicated.   They are also at increased risk of other skin cancers, especially melanoma. Regular self-skin examinations and long-term annual skin checks by an experienced health professional are recommended.     SEBORRHEIC KERATOSES  - Relevant exam: Scattered over the trunk/extremities are waxy brown to black plaques and papules with stuck on appearance and consistent dermoscopy  - Exam and clinical history consistent with seborrheic keratoses  - Counseled that these are benign growths that do not require treatment    MELANOCYTIC NEVI  -Relevant exam: Scattered over the trunk/extremities are homogenously pigmented brown macules and papules. ELM performed and without concerning findings. No outliers unless otherwise noted in today's note  - Exam and clinical history consistent with melanocytic nevi  - Counseled to return to clinic prior to scheduled appointment should any of these lesions change or should any new lesions of concern arise  - Counseled on use of sun protection daily. Reviewed latest FDA sunscreen guidelines, including use of broad spectrum  (UVA and UVB blocking) sunscreen or sun protective clothing with SPF 30-50 every 2-3 hours and reapplied after exposure to water    LENTIGINES  OTHER SKIN CHANGES DUE TO CHRONIC EXPOSURE TO NONIONIZING RADIATION  - Relevant exam: Over sun exposed areas are brown macules. ELM performed and without concerning findings.  - Exam and clinical history consistent with lentigines.  - Counseled to return to clinic prior to scheduled appointment should any of these lesions change or should any new lesions of concern arise.  - Recommended use of sunscreen as above and below.    CHERRY ANGIOMAS  - Relevant exam: Scattered over the trunk/extremities are red papules  - Exam and clinical history consistent with cherry angiomas  - Educated that these are benign      ACTINIC KERATOSES  - Relevant exam: On the left forearm, scalp, and right temple are scaly pink macules without palpable dermal component    - Exam and clinical history consistent with actinic keratoses  - Discussed that these lesions are considered premalignant with the potential to evolve into squamous cell carcinoma.   - Discussed treatment options, which may inclue liquid nitrogen destruction, topical immunotherapy including risks, benefits  - Patient counseled to return to the office in 4-6 weeks after completion of treatment for recheck if not resolved at which time retreatment or biopsy to rule out SCC will be determined based on clinic findings    PROCEDURE:  DESTRUCTION OF PRE-MALIGNANT LESIONS    - After a thorough discussion of treatment options and risk/benefits/alternatives (including but not limited to local pain, scarring, dyspigmentation, blistering, and possible superinfection), verbal and written consent were obtained and the aforementioned lesions were treated on with cryotherapy using liquid nitrogen x 1 cycle for 5-10 seconds.    TOTAL NUMBER of 11 pre-malignant lesions were treated today on the ANATOMIC LOCATION: left forearm (3), scalp (7), and  "right temple (1) .     The patient tolerated the procedure well, and after-care instructions were provided.     NEOPLASM OF UNCERTAIN BEHAVIOR OF SKIN    Physical Exam:  (Anatomic Location); (Size and Morphological Description); (Differential Diagnosis):  Scalp; 0.8 cm x 0.7 cm scaly papule; ddx: scc vs hak   Pertinent Positives:  Pertinent Negatives:    Additional History of Present Condition:  Noted during exam. Patient notes it is tender to the touch.    Assessment and Plan:  I have discussed with the patient that a sample of skin via a \"skin biopsy” would be potentially helpful to further make a specific diagnosis under the microscope.  Based on a thorough discussion of this condition and the management approach to it (including a comprehensive discussion of the known risks, side effects and potential benefits of treatment), the patient (family) agrees to implement the following specific plan:    Procedure:  Skin Biopsy.  After a thorough discussion of treatment options and risk/benefits/alternatives (including but not limited to local pain, scarring, dyspigmentation, blistering, possible superinfection, and inability to confirm a diagnosis via histopathology), verbal and written consent were obtained and portion of the rash was biopsied for tissue sample.  See below for consent that was obtained from patient and subsequent Procedure Note.     PROCEDURE TANGENTIAL (SHAVE) BIOPSY NOTE:    Performing Physician:  Dr. Brian  /Dr Johnson supervising  Anatomic Location; Clinical Description with size (cm); Pre-Op Diagnosis:   Scalp; 0.8 cm x 0.7 cm scaly papule; ddx: scc vs hak   Post-op diagnosis: Same     Local anesthesia: 1% xylocaine with epi      Topical anesthesia: None    Hemostasis: Aluminum chloride       After obtaining informed consent  at which time there was a discussion about the purpose of biopsy  and low risks of infection and bleeding.  The area was prepped and draped in the usual fashion. Anesthesia " "was obtained with 1% lidocaine with epinephrine. A shave biopsy to an appropriate sampling depth was obtained by Shave (Dermablade or 15 blade) The resulting wound was covered with surgical ointment and bandaged appropriately.     The patient tolerated the procedure well without complications and was without signs of functional compromise.      Specimen has been sent for review by Dermatopathology.    Standard post-procedure care has been explained and has been included in written form within the patient's copy of Informed Consent.    INFORMED CONSENT DISCUSSION AND POST-OPERATIVE INSTRUCTIONS FOR PATIENT    I.  RATIONALE FOR PROCEDURE  I understand that a skin biopsy allows the Dermatologist to test a lesion or rash under the microscope to obtain a diagnosis.  It usually involves numbing the area with numbing medication and removing a small piece of skin; sometimes the area will be closed with sutures. In this specific procedure, sutures are not usually needed.  If any sutures are placed, then they are usually need to be removed in 2 weeks or less.    I understand that my Dermatologist recommends that a skin \"shave\" biopsy be performed today.  A local anesthetic, similar to the kind that a dentist uses when filling a cavity, will be injected with a very small needle into the skin area to be sampled.  The injected skin and tissue underneath \"will go to sleep” and become numb so no pain should be felt afterwards.  An instrument shaped like a tiny \"razor blade\" (shave biopsy instrument) will be used to cut a small piece of tissue and skin from the area so that a sample of tissue can be taken and examined more closely under the microscope.  A slight amount of bleeding will occur, but it will be stopped with direct pressure and a pressure bandage and any other appropriate methods.  I understands that a scar will form where the wound was created.  Surgical ointment will be applied to help protect the wound.  Sutures are " "not usually needed.    II.  RISKS AND POTENTIAL COMPLICATIONS   I understand the risks and potential complications of a skin biopsy include but are not limited to the following:  Bleeding  Infection  Pain  Scar/keloid  Skin discoloration  Incomplete Removal  Recurrence  Nerve Damage/Numbness/Loss of Function  Allergic Reaction to Anesthesia  Biopsies are diagnostic procedures and based on findings additional treatment or evaluation may be required  Loss or destruction of specimen resulting in no additional findings    My Dermatologist has explained to me the nature of the condition, the nature of the procedure, and the benefits to be reasonably expected compared with alternative approaches.  My Dermatologist has discussed the likelihood of major risks or complications of this procedure including the specific risks listed above, such as bleeding, infection, and scarring/keloid.  I understand that a scar is expected after this procedure.  I understand that my physician cannot predict if the scar will form a \"keloid,\" which extends beyond the borders of the wound that is created.  A keloid is a thick, painful, and bumpy scar.  A keloid can be difficult to treat, as it does not always respond well to therapy, which includes injecting cortisone directly into the keloid every few weeks.  While this usually reduces the pain and size of the scar, it does not eliminate it.      I understand that photographs may be taken before and after the procedure.  These will be maintained as part of the medical providers confidential records and may not be made available to me.  I further authorize the medical provider to use the photographs for teaching purposes or to illustrate scientific papers, books, or lectures if in his/her judgment, medical research, education, or science may benefit from its use.    I have had an opportunity to fully inquire about the risks and benefits of this procedure and its alternatives.   I have been " "given ample time and opportunity to ask questions and to seek a second opinion if I wished to do so.  I acknowledge that there have specifically been no guarantees as to the cosmetic results from the procedure.  I am aware that with any procedure there is always the possibility of an unexpected complication.    III. POST-PROCEDURAL CARE (WHAT YOU WILL NEED TO DO \"AFTER THE BIOPSY\" TO OPTIMIZE HEALING)    Keep the area clean and dry.  Try NOT to remove the bandage or get it wet for the first 24 hours.    Gently clean the area and apply surgical ointment (such as Vaseline petrolatum ointment, which is available \"over the counter\" and not a prescription) to the biopsy site for up to 2 weeks straight.  This acts to protect the wound from the outside world.      Sutures are not usually placed in this procedure.  If any sutures were placed, return for suture removal as instructed (generally 1 week for the face, 2 weeks for the body).      Take Acetaminophen (Tylenol) for discomfort, if no contraindications.  Ibuprofen or aspirin could make bleeding worse.    Call our office immediately for signs of infection: fever, chills, increased redness, warmth, tenderness, discomfort/pain, or pus or foul smell coming from the wound.    WHAT TO DO IF THERE IS ANY BLEEDING?  If a small amount of bleeding is noticed, place a clean cloth over the area and apply firm pressure for ten minutes.  Check the wound after 10 minutes of direct pressure.  If bleeding persists, try one more time for an additional 10 minutes of direct pressure on the area.  If the bleeding becomes heavier or does not stop after the second attempt, or if you have any other questions about this procedure, then please call your [facility] Dermatologist by calling [phone] (SKIN).     I hereby acknowledge that I have reviewed and verified the site with my Dermatologist and have requested and authorized my Dermatologist to proceed with the procedure.      Scribe " Attestation      I,:  Shellie Ramirez MA am acting as a scribe while in the presence of the attending physician.:       I,:  Tana Johnson MD personally performed the services described in this documentation    as scribed in my presence.:            Resident: Dr Snehal MENDIOLA    This encounter (41696 or [id number]) has a MODERATE level of medical decision making (MDM) given the presence of at least 2 of the elements of MDM (below):  *MODERATE number and complexity of problems addressed: 1 or more chronic illnesses with exacerbation, progression, or side effects of treatment; OR 2 or more stable chronic illnesses; OR 1 undiagnosed new problem with uncertain prognosis; OR 1 acute illness with systemic symptoms; OR 1 acute uncomplicated injury  *MODERATE amount and/or complexity of data reviewed: this includes review of previous notes and/or lab tests, independent interpretation of tests performed by another healthcare professional, ordering tests, assessment requiring independent historian, or discussion with other healthcare professionals.  *MODERATE risk of complications and/or morbidity or mortality of patient management (for example, prescription drug management, decisions regarding minor surgery with identified patient or procedure risk factors).     Statement Selected

## 2025-01-02 NOTE — ED ADULT TRIAGE NOTE - NSWEIGHTCALCTOOLDRUG_GEN_A_CORE
----- Message from Johnathon Prado MD sent at 1/1/2025  3:23 PM CST -----  I hope Dawson had a wonderful birthday, [place], and good start to 2025.  Please inform him his microalbumin level which is the amount of protein he is spilling in his urine is normal, and his PSA level is normal.  The rest of his labs are not.  His average blood sugar/hemoglobin A1c is improved, but still elevated.  Please make sure he is taking 3 mg of Trulicity weekly, and find out his dosing of Semglee as well as the NovoLog.  We will probably try to increase the NovoLog prior to each meal.  His metabolic panel continues to show slightly decreased kidney function and he needs to try to avoid caffeine, anti-inflammatory medications, decongestant medications, and stay very well-hydrated.    used

## 2025-01-28 NOTE — ED CLERICAL - NS ED CLERK UNITS
Patient called back stating she actually DOES have a BP monitor at home. She is wondering when she should take BP - before or after she eats; how many times a day should she take it?  Please call patient back to advise.   [id number]

## 2025-02-06 ENCOUNTER — APPOINTMENT (OUTPATIENT)
Dept: DERMATOLOGY | Facility: CLINIC | Age: 89
End: 2025-02-06
Payer: MEDICARE

## 2025-02-06 PROCEDURE — 99213 OFFICE O/P EST LOW 20 MIN: CPT | Mod: 25

## 2025-02-06 PROCEDURE — 11102 TANGNTL BX SKIN SINGLE LES: CPT

## 2025-02-28 ENCOUNTER — NON-APPOINTMENT (OUTPATIENT)
Age: 89
End: 2025-02-28

## 2025-02-28 ENCOUNTER — APPOINTMENT (OUTPATIENT)
Dept: DERMATOLOGY | Facility: CLINIC | Age: 89
End: 2025-02-28
Payer: MEDICARE

## 2025-02-28 DIAGNOSIS — Z85.828 PERSONAL HISTORY OF OTHER MALIGNANT NEOPLASM OF SKIN: ICD-10-CM

## 2025-02-28 DIAGNOSIS — C44.319 BASAL CELL CARCINOMA OF SKIN OF OTHER PARTS OF FACE: ICD-10-CM

## 2025-02-28 PROCEDURE — 12052 INTMD RPR FACE/MM 2.6-5.0 CM: CPT

## 2025-02-28 PROCEDURE — 17311 MOHS 1 STAGE H/N/HF/G: CPT

## 2025-02-28 RX ORDER — MUPIROCIN 20 MG/G
2 OINTMENT TOPICAL TWICE DAILY
Qty: 1 | Refills: 6 | Status: ACTIVE | COMMUNITY
Start: 2025-02-28 | End: 1900-01-01

## 2025-02-28 RX ORDER — MUPIROCIN 20 MG/G
2 OINTMENT TOPICAL
Qty: 44 | Refills: 1 | Status: ACTIVE | COMMUNITY
Start: 2025-02-28 | End: 1900-01-01

## 2025-03-01 PROBLEM — Z85.828: Status: RESOLVED | Noted: 2025-02-28 | Resolved: 2025-02-28

## 2025-03-01 NOTE — ED ADULT TRIAGE NOTE - PAIN: PRESENCE, MLM
Home Health Care orders placed and [facility] Booked.          [facility]    Basic Information    Address:  [address]  Phone:  [phone]  Fax:  [phone]   complains of pain/discomfort

## 2025-03-11 NOTE — DIETITIAN INITIAL EVALUATION ADULT - RD TO REMAIN AVAILABLE
Spoke with Jaqui 
yes

## 2025-03-17 ENCOUNTER — APPOINTMENT (OUTPATIENT)
Dept: DERMATOLOGY | Facility: CLINIC | Age: 89
End: 2025-03-17
Payer: MEDICARE

## 2025-03-17 ENCOUNTER — NON-APPOINTMENT (OUTPATIENT)
Age: 89
End: 2025-03-17

## 2025-03-17 ENCOUNTER — RESULT REVIEW (OUTPATIENT)
Age: 89
End: 2025-03-17

## 2025-03-17 DIAGNOSIS — L72.11 PILAR CYST: ICD-10-CM

## 2025-03-17 PROCEDURE — 11422 EXC H-F-NK-SP B9+MARG 1.1-2: CPT

## 2025-03-17 PROCEDURE — 12032 INTMD RPR S/A/T/EXT 2.6-7.5: CPT

## 2025-03-17 RX ORDER — MUPIROCIN 20 MG/G
2 OINTMENT TOPICAL
Qty: 1 | Refills: 1 | Status: ACTIVE | COMMUNITY
Start: 2025-03-17 | End: 1900-01-01

## 2025-04-15 NOTE — H&P PST ADULT - PROBLEM SELECTOR PLAN 3
None Currently states she is unsure if she is still taking her BP medication. Will check when home, getting MC.

## 2025-05-17 ENCOUNTER — INPATIENT (INPATIENT)
Facility: HOSPITAL | Age: 89
LOS: 5 days | Discharge: NURSING, REHAB OR LONG-TERM CARE | DRG: 563 | End: 2025-05-23
Attending: INTERNAL MEDICINE | Admitting: STUDENT IN AN ORGANIZED HEALTH CARE EDUCATION/TRAINING PROGRAM
Payer: MEDICARE

## 2025-05-17 VITALS
OXYGEN SATURATION: 100 % | HEIGHT: 65 IN | TEMPERATURE: 98 F | RESPIRATION RATE: 18 BRPM | WEIGHT: 115.96 LBS | HEART RATE: 68 BPM | SYSTOLIC BLOOD PRESSURE: 170 MMHG | DIASTOLIC BLOOD PRESSURE: 84 MMHG

## 2025-05-17 DIAGNOSIS — Z98.891 HISTORY OF UTERINE SCAR FROM PREVIOUS SURGERY: Chronic | ICD-10-CM

## 2025-05-17 DIAGNOSIS — Z90.49 ACQUIRED ABSENCE OF OTHER SPECIFIED PARTS OF DIGESTIVE TRACT: Chronic | ICD-10-CM

## 2025-05-17 DIAGNOSIS — Z98.49 CATARACT EXTRACTION STATUS, UNSPECIFIED EYE: Chronic | ICD-10-CM

## 2025-05-17 DIAGNOSIS — Z96.651 PRESENCE OF RIGHT ARTIFICIAL KNEE JOINT: Chronic | ICD-10-CM

## 2025-05-17 LAB
ALBUMIN SERPL ELPH-MCNC: 3.8 G/DL — SIGNIFICANT CHANGE UP (ref 3.3–5.2)
ALP SERPL-CCNC: 70 U/L — SIGNIFICANT CHANGE UP (ref 40–120)
ALT FLD-CCNC: 8 U/L — SIGNIFICANT CHANGE UP
ANION GAP SERPL CALC-SCNC: 16 MMOL/L — SIGNIFICANT CHANGE UP (ref 5–17)
APTT BLD: 28.1 SEC — SIGNIFICANT CHANGE UP (ref 26.1–36.8)
AST SERPL-CCNC: 15 U/L — SIGNIFICANT CHANGE UP
BASOPHILS # BLD AUTO: 0.05 K/UL — SIGNIFICANT CHANGE UP (ref 0–0.2)
BASOPHILS NFR BLD AUTO: 0.4 % — SIGNIFICANT CHANGE UP (ref 0–2)
BILIRUB SERPL-MCNC: 0.5 MG/DL — SIGNIFICANT CHANGE UP (ref 0.4–2)
BUN SERPL-MCNC: 10.3 MG/DL — SIGNIFICANT CHANGE UP (ref 8–20)
CALCIUM SERPL-MCNC: 8.8 MG/DL — SIGNIFICANT CHANGE UP (ref 8.4–10.5)
CHLORIDE SERPL-SCNC: 100 MMOL/L — SIGNIFICANT CHANGE UP (ref 96–108)
CO2 SERPL-SCNC: 22 MMOL/L — SIGNIFICANT CHANGE UP (ref 22–29)
CREAT SERPL-MCNC: 0.41 MG/DL — LOW (ref 0.5–1.3)
EGFR: 92 ML/MIN/1.73M2 — SIGNIFICANT CHANGE UP
EGFR: 92 ML/MIN/1.73M2 — SIGNIFICANT CHANGE UP
EOSINOPHIL # BLD AUTO: 0.01 K/UL — SIGNIFICANT CHANGE UP (ref 0–0.5)
EOSINOPHIL NFR BLD AUTO: 0.1 % — SIGNIFICANT CHANGE UP (ref 0–6)
GLUCOSE SERPL-MCNC: 149 MG/DL — HIGH (ref 70–99)
HCT VFR BLD CALC: 41.6 % — SIGNIFICANT CHANGE UP (ref 34.5–45)
HGB BLD-MCNC: 13.5 G/DL — SIGNIFICANT CHANGE UP (ref 11.5–15.5)
IMM GRANULOCYTES # BLD AUTO: 0.06 K/UL — SIGNIFICANT CHANGE UP (ref 0–0.07)
IMM GRANULOCYTES NFR BLD AUTO: 0.5 % — SIGNIFICANT CHANGE UP (ref 0–0.9)
INR BLD: 1.06 RATIO — SIGNIFICANT CHANGE UP (ref 0.85–1.16)
LYMPHOCYTES # BLD AUTO: 0.7 K/UL — LOW (ref 1–3.3)
LYMPHOCYTES NFR BLD AUTO: 5.3 % — LOW (ref 13–44)
MCHC RBC-ENTMCNC: 28.8 PG — SIGNIFICANT CHANGE UP (ref 27–34)
MCHC RBC-ENTMCNC: 32.5 G/DL — SIGNIFICANT CHANGE UP (ref 32–36)
MCV RBC AUTO: 88.9 FL — SIGNIFICANT CHANGE UP (ref 80–100)
MONOCYTES # BLD AUTO: 1.1 K/UL — HIGH (ref 0–0.9)
MONOCYTES NFR BLD AUTO: 8.3 % — SIGNIFICANT CHANGE UP (ref 2–14)
NEUTROPHILS # BLD AUTO: 11.39 K/UL — HIGH (ref 1.8–7.4)
NEUTROPHILS NFR BLD AUTO: 85.4 % — HIGH (ref 43–77)
NRBC # BLD AUTO: 0 K/UL — SIGNIFICANT CHANGE UP (ref 0–0)
NRBC # FLD: 0 K/UL — SIGNIFICANT CHANGE UP (ref 0–0)
NRBC BLD AUTO-RTO: 0 /100 WBCS — SIGNIFICANT CHANGE UP (ref 0–0)
PLATELET # BLD AUTO: 213 K/UL — SIGNIFICANT CHANGE UP (ref 150–400)
PMV BLD: 10.5 FL — SIGNIFICANT CHANGE UP (ref 7–13)
POTASSIUM SERPL-MCNC: 4 MMOL/L — SIGNIFICANT CHANGE UP (ref 3.5–5.3)
POTASSIUM SERPL-SCNC: 4 MMOL/L — SIGNIFICANT CHANGE UP (ref 3.5–5.3)
PROT SERPL-MCNC: 6.4 G/DL — LOW (ref 6.6–8.7)
PROTHROM AB SERPL-ACNC: 12.3 SEC — SIGNIFICANT CHANGE UP (ref 9.9–13.4)
RBC # BLD: 4.68 M/UL — SIGNIFICANT CHANGE UP (ref 3.8–5.2)
RBC # FLD: 13.4 % — SIGNIFICANT CHANGE UP (ref 10.3–14.5)
SODIUM SERPL-SCNC: 138 MMOL/L — SIGNIFICANT CHANGE UP (ref 135–145)
WBC # BLD: 13.31 K/UL — HIGH (ref 3.8–10.5)
WBC # FLD AUTO: 13.31 K/UL — HIGH (ref 3.8–10.5)

## 2025-05-17 PROCEDURE — 73070 X-RAY EXAM OF ELBOW: CPT | Mod: 26,RT

## 2025-05-17 PROCEDURE — 71045 X-RAY EXAM CHEST 1 VIEW: CPT | Mod: 26

## 2025-05-17 PROCEDURE — 99285 EMERGENCY DEPT VISIT HI MDM: CPT

## 2025-05-17 PROCEDURE — 73030 X-RAY EXAM OF SHOULDER: CPT | Mod: 26,RT

## 2025-05-17 RX ORDER — IBUPROFEN 200 MG
400 TABLET ORAL ONCE
Refills: 0 | Status: COMPLETED | OUTPATIENT
Start: 2025-05-17 | End: 2025-05-17

## 2025-05-17 RX ADMIN — Medication 400 MILLIGRAM(S): at 21:50

## 2025-05-17 NOTE — ED ADULT TRIAGE NOTE - CHIEF COMPLAINT QUOTE
c/o right shoulder pain. reports mechanical fall over walker. denies loc, blood thinners or head strike.

## 2025-05-17 NOTE — ED PROVIDER NOTE - PHYSICAL EXAMINATION
Patient is in moderate distress complains of right shoulder pain.  Neuro is nonfocal no evidence of head trauma no evidence of facial trauma neck midline nontender right paraspinal tenderness into the right trapezius  NVI distally

## 2025-05-17 NOTE — ED ADULT NURSE NOTE - OBJECTIVE STATEMENT
Patient presents to ED from [facility]. Patient is complaining of  right shoulder pain from injury sustained during mechanical fall over walker. Patient denies LOC, blood thinners or head strike. States she takes 1 baby ASA a day. Patient has no other complaints at this time. Patient has limited movement in right arm. Positive pulse to extremity and is able to move fingers.

## 2025-05-17 NOTE — ED ADULT TRIAGE NOTE - ACCOMPANIED BY
Give steroids as prescribed.  Use nebulizer every 6-8 hours when he is awake.  Use a humidifier at night.  Follow up closely with his pediatrician.  Return for any new or worsening symptoms.   EMT/paramedic

## 2025-05-17 NOTE — ED PROVIDER NOTE - RESPIRATORY NEGATIVE STATEMENT, MLM
no chest pain, no cough, and no shortness of breath. [Negative] : Heme/Lymph [FreeTextEntry4] : Occas seasonal allergies and uses Allegra 180 on rare occasion [FreeTextEntry8] : Hot flashes are ok

## 2025-05-17 NOTE — ED PROVIDER NOTE - CLINICAL SUMMARY MEDICAL DECISION MAKING FREE TEXT BOX
89-year-old female is referred from the [facility] at [place] status post fall in the hallway was seen to hit her head.  Patient has a history of muscle weakness difficulty walking hyperlipidemia patient states she was using her walker and fell complains of right arm pain and neck pain denies hitting her head no LOC. VSS   Patient is in moderate distress complains of right shoulder pain.   Neuro is nonfocal .no evidence of head trauma .no evidence of facial trauma, neck midline nontender right paraspinal tenderness into the right trapezius  NVI distally    Lungs are clear breath sounds are equal bilaterally cardiac exam is within normal limits no tachycardia.  Abdomen is soft nontender hips are stable.  able to flex at the hips and knees no rotation no shortening.    Plan CT of the head and neck x-ray of the shoulder which shows a humeral head fracture.  Patient will have to be placed for subacute rehab she will not be able to use her right arm to ambulate with her walker..  Son has been informed of the plan.

## 2025-05-17 NOTE — ED PROVIDER NOTE - PROGRESS NOTE DETAILS
[facility] Received sign out regarding patient, will follow up with labs, imaging.     obs for umberto

## 2025-05-17 NOTE — ED PROVIDER NOTE - MDM ORDERS SUBMITTED SELECTION
CM obtained all of the following info about the pt  HOME: Pt lives in a 2nd floor apartment with [address]; he denies difficulties climbing stairs  LIVES W/: Alone  :  Eunice Pope, [phone]  INDEPENDENT: Yes  DME: None  VNA: No  I/P REHAB: No  HHC: No  MENTAL HEALTH ISSUES: No  D&A ISSUES: No  PCP: Severa Pascal, DO  PHARMACY: [facility] [address] [place] [address] , [address] [place] [address], [phone]  INCOME SOURCE: Retired  INSURANCE: Medicare & [address] POA is dtr, Martha Thomas, but there is no paperwork on chart; made request for such  TRANSPORTATION AT D/C: Dtr     CM reviewed d/c planning process including the following: identifying help at home, patient preference for d/c planning needs, [facility] to [facility] program, availability of treatment team to discuss questions or concerns patient and/or family may have regarding understanding medications and recognizing signs and symptoms once discharged  CM also encouraged patient to follow up with all recommended appointments after discharge  Patient advised of importance for patient and family to participate in managing patients medical well being  Patient/caregiver received discharge checklist  Content reviewed  Patient/caregiver encouraged to participate in discharge plan of care prior to discharge home      FRANCIA Webber  09/20/2019  [id number] [facility]

## 2025-05-17 NOTE — ED PROVIDER NOTE - OBJECTIVE STATEMENT
89-year-old female is referred from the [facility] at [place] status post fall in the hallway was seen to hit her head.  Patient has a history of muscle weakness difficulty walking hyperlipidemia patient states she was using her walker and fell complains of right arm pain and neck pain denies hitting her head no LOC. 89-year-old female is referred from the [facility] status post fall in the hallway was seen to hit her head.  Patient has a history of muscle weakness difficulty walking hyperlipidemia patient states she was using her walker and fell complains of right arm pain and neck pain denies hitting her head no LOC.

## 2025-05-17 NOTE — ED ADULT NURSE NOTE - NSFALLHARMRISKINTERV_ED_ALL_ED

## 2025-05-18 DIAGNOSIS — S42.293A OTHER DISPLACED FRACTURE OF UPPER END OF UNSPECIFIED HUMERUS, INITIAL ENCOUNTER FOR CLOSED FRACTURE: ICD-10-CM

## 2025-05-18 PROCEDURE — 70450 CT HEAD/BRAIN W/O DYE: CPT | Mod: 26

## 2025-05-18 PROCEDURE — 73562 X-RAY EXAM OF KNEE 3: CPT | Mod: 26,RT

## 2025-05-18 PROCEDURE — 99223 1ST HOSP IP/OBS HIGH 75: CPT

## 2025-05-18 PROCEDURE — 72125 CT NECK SPINE W/O DYE: CPT | Mod: 26

## 2025-05-18 PROCEDURE — 99497 ADVNCD CARE PLAN 30 MIN: CPT | Mod: 25

## 2025-05-18 RX ORDER — OXYCODONE HYDROCHLORIDE 30 MG/1
2.5 TABLET ORAL ONCE
Refills: 0 | Status: DISCONTINUED | OUTPATIENT
Start: 2025-05-18 | End: 2025-05-18

## 2025-05-18 RX ORDER — BRIMONIDINE TARTRATE 1.5 MG/ML
1 SOLUTION/ DROPS OPHTHALMIC
Refills: 0 | Status: DISCONTINUED | OUTPATIENT
Start: 2025-05-18 | End: 2025-05-23

## 2025-05-18 RX ORDER — OXYBUTYNIN CHLORIDE 5 MG/1
5 TABLET, FILM COATED, EXTENDED RELEASE ORAL DAILY
Refills: 0 | Status: DISCONTINUED | OUTPATIENT
Start: 2025-05-18 | End: 2025-05-23

## 2025-05-18 RX ORDER — LEVOTHYROXINE SODIUM 300 MCG
100 TABLET ORAL DAILY
Refills: 0 | Status: DISCONTINUED | OUTPATIENT
Start: 2025-05-19 | End: 2025-05-23

## 2025-05-18 RX ORDER — ASPIRIN 325 MG
81 TABLET ORAL
Refills: 0 | Status: DISCONTINUED | OUTPATIENT
Start: 2025-05-18 | End: 2025-05-23

## 2025-05-18 RX ORDER — LATANOPROST PF 0.05 MG/ML
1 SOLUTION/ DROPS OPHTHALMIC AT BEDTIME
Refills: 0 | Status: DISCONTINUED | OUTPATIENT
Start: 2025-05-18 | End: 2025-05-23

## 2025-05-18 RX ORDER — IBUPROFEN 200 MG
400 TABLET ORAL EVERY 6 HOURS
Refills: 0 | Status: DISCONTINUED | OUTPATIENT
Start: 2025-05-18 | End: 2025-05-23

## 2025-05-18 RX ORDER — ATORVASTATIN CALCIUM 80 MG/1
10 TABLET, FILM COATED ORAL AT BEDTIME
Refills: 0 | Status: DISCONTINUED | OUTPATIENT
Start: 2025-05-18 | End: 2025-05-23

## 2025-05-18 RX ORDER — AMLODIPINE BESYLATE 10 MG/1
2.5 TABLET ORAL DAILY
Refills: 0 | Status: DISCONTINUED | OUTPATIENT
Start: 2025-05-18 | End: 2025-05-20

## 2025-05-18 RX ORDER — OXYBUTYNIN CHLORIDE 5 MG/1
5 TABLET, FILM COATED, EXTENDED RELEASE ORAL DAILY
Refills: 0 | Status: DISCONTINUED | OUTPATIENT
Start: 2025-05-18 | End: 2025-05-18

## 2025-05-18 RX ORDER — OXYCODONE HYDROCHLORIDE 30 MG/1
2.5 TABLET ORAL EVERY 6 HOURS
Refills: 0 | Status: DISCONTINUED | OUTPATIENT
Start: 2025-05-18 | End: 2025-05-23

## 2025-05-18 RX ADMIN — BRIMONIDINE TARTRATE 1 DROP(S): 1.5 SOLUTION/ DROPS OPHTHALMIC at 18:48

## 2025-05-18 RX ADMIN — OXYBUTYNIN CHLORIDE 5 MILLIGRAM(S): 5 TABLET, FILM COATED, EXTENDED RELEASE ORAL at 21:48

## 2025-05-18 RX ADMIN — ATORVASTATIN CALCIUM 10 MILLIGRAM(S): 80 TABLET, FILM COATED ORAL at 21:48

## 2025-05-18 RX ADMIN — OXYCODONE HYDROCHLORIDE 2.5 MILLIGRAM(S): 30 TABLET ORAL at 15:59

## 2025-05-18 RX ADMIN — LATANOPROST PF 1 DROP(S): 0.05 SOLUTION/ DROPS OPHTHALMIC at 21:48

## 2025-05-18 RX ADMIN — OXYCODONE HYDROCHLORIDE 2.5 MILLIGRAM(S): 30 TABLET ORAL at 07:52

## 2025-05-18 RX ADMIN — Medication 81 MILLIGRAM(S): at 21:48

## 2025-05-18 RX ADMIN — OXYCODONE HYDROCHLORIDE 2.5 MILLIGRAM(S): 30 TABLET ORAL at 15:10

## 2025-05-18 NOTE — PHYSICAL THERAPY INITIAL EVALUATION ADULT - PASSIVE RANGE OF MOTION EXAMINATION, REHAB EVAL
with exception to right shoulder flexion - unable to tolerate PROM assessment/no Passive ROM deficits were identified/deficits as listed below

## 2025-05-18 NOTE — PHYSICAL THERAPY INITIAL EVALUATION ADULT - ACTIVE RANGE OF MOTION EXAMINATION, REHAB EVAL
with exception to right UE limited by pain - 0 degrees shoulder flexion, elbow flexion to 20 degrees/no Active ROM deficits were identified/deficits as listed below

## 2025-05-18 NOTE — PHYSICAL THERAPY INITIAL EVALUATION ADULT - ADDITIONAL COMMENTS
Pt lives in the [facility] and reports she was modified independent with a RW prior to admission. No other DME.

## 2025-05-18 NOTE — H&P ADULT - CONVERSATION DETAILS
Patient states that based on age, wants to be DNR DNI.  Does not want any feeding tubes.  Undecided about HD if needed, will decide if renal failure occurs,  MOLST completed at bedside and placed in pt chart.

## 2025-05-18 NOTE — CONSULT NOTE ADULT - SUBJECTIVE AND OBJECTIVE BOX
Pt Name: ANSLEY DAVENPORT    MRN: [id number]      Patient is a 89y Female presenting to the emergency department with a chief complaint of right shoulder pain after a fall at her assisted living facility/ [facility]. Patient reports she was ambulating with her rolling walker and her foot got caught causing her to fall onto her right side. Denies hitting her head or LOC. Patient did not have any symptoms prior, denies lightheadedness, dizziness, chest pain, sob, palpitations prior to the event.    REVIEW OF SYSTEMS    General: Alert, responsive, in NAD    Skin/Breast: No rashes, no pruritis   bruising noted to right axillary region	  Ophthalmologic: No visual changes. No redness.   	  ENMT:	No discharge. No swelling.    Respiratory and Thorax: No difficulty breathing. No cough.  	   Cardiovascular:	No chest pain. No palpitations.    Gastrointestinal:	 No abdominal pain. No diarrhea.     Genitourinary: No dysuria. No bleeding.    Musculoskeletal: SEE HPI.    Neurological: No sensory or motor changes.     Psychiatric: No anxiety or depression.    Hematology/Lymphatics: No swelling.    Endocrine: No Hx of diabetes.    ROS is otherwise negative.    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 400 milliGRAM(s) Oral every 6 hours PRN  latanoprost 0.005% Ophthalmic Solution 1 Drop(s) Both EYES at bedtime  levothyroxine 100 MICROGram(s) Oral daily  oxybutynin XL 5 milliGRAM(s) Oral daily  oxyCODONE    IR 2.5 milliGRAM(s) Oral every 6 hours PRN      FAMILY HISTORY:  FH: liver cancer (Mother)    : non-contributory    Social History:     Ambulation: Walking with walker                          13.5   13.31 )-----------( 213      ( 17 May 2025 21:31 )             41.6           138  |  100  |  10.3  ----------------------------<  149[H]  4.0   |  22.0  |  0.41[L]    Ca    8.8      17 May 2025 21:31    TPro  6.4[L]  /  Alb  3.8  /  TBili  0.5  /  DBili  x   /  AST  15  /  ALT  8   /  AlkPhos  70        Vital Signs Last 24 Hrs  T(C): 36.7 (18 May 2025 11:26), Max: 36.7 (17 May 2025 18:21)  T(F): 98.1 (18 May 2025 11:26), Max: 98.1 (18 May 2025 11:26)  HR: 84 (18 May 2025 11:26) (68 - 84)  BP: 113/73 (18 May 2025 11:26) (113/73 - 170/84)  BP(mean): --  RR: 18 (18 May 2025 11:26) (18 - 18)  SpO2: 96% (18 May 2025 11:26) (96% - 100%)    Parameters below as of 18 May 2025 11:26  Patient On (Oxygen Delivery Method): room air        Daily Height in cm: 165.1 (17 May 2025 18:21)    Daily       PHYSICAL EXAM:      Appearance: Alert, responsive, in no acute distress.    Neurological: Sensation is grossly intact to light touch. 5/5 motor function of all extremities. No focal deficits or weaknesses found.    Skin:  skin remains intact.  bruising noted to right axillary region.   Bruising noted to right knee/ x ray ordered.     Vascular: 2+ distal pulses. Cap refill < 2 sec. No signs of venous insufficiency or stasis. No extremity ulcerations. No cyanosis.    Musculoskeletal:       Right Upper Extremity: Tenderness to palpation right humerus/ humeral head shoulder region  Motor and sensory intact RUE  Compartments  soft non tender   full painless ROM to hand, fingers and wrist.    no pain to elbow   bruising noted to right knee.  minimal tenderness to palpation   AROM limited by pain and bruising      Imaging Studies:  < from: Xray Shoulder 2 Views, Right (25 @ 21:22) >  ACC: [id number] EXAM:  XR ELBOW 2 VIEWS RT   ORDERED BY: STEVEN FULLRE     ACC: [id number] EXAM:  XR SHOULDER COMP MIN 2V RT   ORDERED BY: STEVEN FULLER     PROCEDURE DATE:  2025          INTERPRETATION:  3 views of the right shoulder and 4 views of the right   elbow.    INDICATION: Right arm pain.    IMPRESSION: The bones are demineralized. There is a transverse, mildly   impacted and mildly comminuted right humeral neck fracture. There is no   glenohumeral dislocation.Mildly humeral and moderate AC joint arthrosis.    Limited evaluation of the elbow secondary to demineralization. No   fracture identified. If there is persistent concern for radial head   fracture, cross-sectional imaging can be obtained. Mild degenerative   changes of the elbow. Chondrocalcinosis.    --- End of Report ---            JUAN STODDARD MD; Attending Radiologist  This document has been electronically signed. May 18 2025 12:33PM    < end of copied text >    A/P:  Pt is a  89y Female with right proximal humeral neck fracture    PLAN:   *Non weight bearing Right upper extremity  Sling for comfort  X rays  ordered for right knee  pain control  Follow up in the office with Dr Lima  call for an appointment

## 2025-05-18 NOTE — H&P ADULT - HISTORY OF PRESENT ILLNESS
Observation Upgrade    89F hx of hypothyroidism, HTN, HLD, OAB presenting to the hospital after mechanical fall.   Patient states that two days prior she had tripped when walking (using rolling walker) at [facility] [employer] [employer] [facility] [employer].  Patient mostly fell on her right UE, denies having head trauma or LOC.  Patient did not have any symptoms prior, denies lightheadedness, dizziness, chest pain, sob, palpitations prior to the event.  Patient states that occasionally has burning with urination.  Tolerating po diet.  Compliant to home medications.

## 2025-05-18 NOTE — PHYSICAL THERAPY INITIAL EVALUATION ADULT - PERTINENT HX OF CURRENT PROBLEM, REHAB EVAL
89-year-old female is referred from the [facility] at [place] status post fall in the hallway was seen to hit her head.  Patient has a history of muscle weakness difficulty walking hyperlipidemia patient states she was using her walker and fell complains of right arm pain and neck pain denies hitting her head no LOC; Wet read of imaging shows a right humeral head fracture.

## 2025-05-18 NOTE — ED ADULT NURSE REASSESSMENT NOTE - NS ED NURSE REASSESS COMMENT FT1
Assumed care at 0715, received report from Bob RN, pt a&ox4, pain meds administered, VSS, pt complains of shoulder pain, no distress noted. Pt admitted for observation.

## 2025-05-18 NOTE — H&P ADULT - ASSESSMENT
89F hx of hypothyroidism, HTN, HLD, OAB presenting to the hospital after mechanical fall. Patient with right humeral neck fracture.     #R humeral neck fracture  -ortho consulted by writer of note, recs pending  -pain control: ibuprofen, oxycodone based on pain scale  -PT consulted: rec for [facility] (need 3 night stay)    #HTN  #HLD  -c/w amlodipine  -c/w statin    #OAB  -c/w oxybutynin     #hypothyroidism  -c/w synthroid    diet: dash  dvt ppx: scd  dispo: need 3 night inpt stay prior to placement to [facility] 89F hx of hypothyroidism, HTN, HLD, OAB presenting to the hospital after mechanical fall. Patient with right humeral neck fracture.     #R humeral neck fracture  -ortho consulted by writer of note, recs pending  -pain control: ibuprofen, oxycodone based on pain scale  -PT consulted: rec for [facility] (need 3 night stay)    #HTN  #HLD  -c/w amlodipine  -c/w statin    #OAB  -c/w oxybutynin   -check UA, start abx if positive    #hypothyroidism  -c/w synthroid    diet: dash  dvt ppx: scd  dispo: need 3 night inpt stay prior to placement to [facility]

## 2025-05-18 NOTE — H&P ADULT - NSHPPHYSICALEXAM_GEN_ALL_CORE
GENERAL: NAD, lying in bed comfortably  HEAD:  Atraumatic, normocephalic  EYES: EOMI, PERRL  NECK: Supple, trachea midline, no JVD  HEART: Regular rate and rhythm  LUNGS: Unlabored respirations.  Clear to auscultation bilaterally, no crackles, wheezing, or rhonchi  ABDOMEN: Soft, nontender, nondistended, +BS  EXTREMITIES: right hand with swelling. RUE in sling  NERVOUS SYSTEM:  A&Ox3, moving all extremities, no focal deficits

## 2025-05-18 NOTE — PHYSICAL THERAPY INITIAL EVALUATION ADULT - MANUAL MUSCLE TESTING RESULTS, REHAB EVAL
all extremities 4/5 with exception to right UE; shoulder flexion 0/5, elbow flex and ext 1/5, wrist ext and flexion 2/5; limited by pain

## 2025-05-18 NOTE — ED ADULT NURSE REASSESSMENT NOTE - NS ED NURSE REASSESS COMMENT FT1
Ordered pt a food tray, pt has sling in place placed by PT, pt allowed to eat as per MD Hills, pt requesting pain meds when he food comes, made MD Hills aware.

## 2025-05-18 NOTE — PATIENT PROFILE ADULT - FALL HARM RISK - HARM RISK INTERVENTIONS

## 2025-05-19 ENCOUNTER — TRANSCRIPTION ENCOUNTER (OUTPATIENT)
Age: 89
End: 2025-05-19

## 2025-05-19 LAB
ALBUMIN SERPL ELPH-MCNC: 3.6 G/DL — SIGNIFICANT CHANGE UP (ref 3.3–5.2)
ALP SERPL-CCNC: 69 U/L — SIGNIFICANT CHANGE UP (ref 40–120)
ALT FLD-CCNC: 17 U/L — SIGNIFICANT CHANGE UP
ANION GAP SERPL CALC-SCNC: 15 MMOL/L — SIGNIFICANT CHANGE UP (ref 5–17)
AST SERPL-CCNC: 18 U/L — SIGNIFICANT CHANGE UP
BILIRUB SERPL-MCNC: 0.9 MG/DL — SIGNIFICANT CHANGE UP (ref 0.4–2)
BUN SERPL-MCNC: 13.5 MG/DL — SIGNIFICANT CHANGE UP (ref 8–20)
CALCIUM SERPL-MCNC: 8.5 MG/DL — SIGNIFICANT CHANGE UP (ref 8.4–10.5)
CHLORIDE SERPL-SCNC: 98 MMOL/L — SIGNIFICANT CHANGE UP (ref 96–108)
CO2 SERPL-SCNC: 24 MMOL/L — SIGNIFICANT CHANGE UP (ref 22–29)
CREAT SERPL-MCNC: 0.34 MG/DL — LOW (ref 0.5–1.3)
EGFR: 97 ML/MIN/1.73M2 — SIGNIFICANT CHANGE UP
EGFR: 97 ML/MIN/1.73M2 — SIGNIFICANT CHANGE UP
GLUCOSE SERPL-MCNC: 125 MG/DL — HIGH (ref 70–99)
HCT VFR BLD CALC: 42.3 % — SIGNIFICANT CHANGE UP (ref 34.5–45)
HGB BLD-MCNC: 14.3 G/DL — SIGNIFICANT CHANGE UP (ref 11.5–15.5)
MCHC RBC-ENTMCNC: 29.5 PG — SIGNIFICANT CHANGE UP (ref 27–34)
MCHC RBC-ENTMCNC: 33.8 G/DL — SIGNIFICANT CHANGE UP (ref 32–36)
MCV RBC AUTO: 87.2 FL — SIGNIFICANT CHANGE UP (ref 80–100)
NRBC # BLD AUTO: 0 K/UL — SIGNIFICANT CHANGE UP (ref 0–0)
NRBC # FLD: 0 K/UL — SIGNIFICANT CHANGE UP (ref 0–0)
NRBC BLD AUTO-RTO: 0 /100 WBCS — SIGNIFICANT CHANGE UP (ref 0–0)
PLATELET # BLD AUTO: 208 K/UL — SIGNIFICANT CHANGE UP (ref 150–400)
PMV BLD: 10.9 FL — SIGNIFICANT CHANGE UP (ref 7–13)
POTASSIUM SERPL-MCNC: 3.9 MMOL/L — SIGNIFICANT CHANGE UP (ref 3.5–5.3)
POTASSIUM SERPL-SCNC: 3.9 MMOL/L — SIGNIFICANT CHANGE UP (ref 3.5–5.3)
PROT SERPL-MCNC: 6.5 G/DL — LOW (ref 6.6–8.7)
RBC # BLD: 4.85 M/UL — SIGNIFICANT CHANGE UP (ref 3.8–5.2)
RBC # FLD: 13.7 % — SIGNIFICANT CHANGE UP (ref 10.3–14.5)
SODIUM SERPL-SCNC: 137 MMOL/L — SIGNIFICANT CHANGE UP (ref 135–145)
WBC # BLD: 12.35 K/UL — HIGH (ref 3.8–10.5)
WBC # FLD AUTO: 12.35 K/UL — HIGH (ref 3.8–10.5)

## 2025-05-19 PROCEDURE — 99232 SBSQ HOSP IP/OBS MODERATE 35: CPT

## 2025-05-19 RX ORDER — ONDANSETRON HCL/PF 4 MG/2 ML
4 VIAL (ML) INJECTION ONCE
Refills: 0 | Status: COMPLETED | OUTPATIENT
Start: 2025-05-19 | End: 2025-05-19

## 2025-05-19 RX ORDER — BRIMONIDINE TARTRATE 1.5 MG/ML
1 SOLUTION/ DROPS OPHTHALMIC
Refills: 0 | DISCHARGE

## 2025-05-19 RX ORDER — ALENDRONATE SODIUM 70 MG/1
1 TABLET ORAL
Refills: 0 | DISCHARGE

## 2025-05-19 RX ADMIN — ATORVASTATIN CALCIUM 10 MILLIGRAM(S): 80 TABLET, FILM COATED ORAL at 22:06

## 2025-05-19 RX ADMIN — Medication 4 MILLIGRAM(S): at 04:02

## 2025-05-19 RX ADMIN — LATANOPROST PF 1 DROP(S): 0.05 SOLUTION/ DROPS OPHTHALMIC at 22:06

## 2025-05-19 RX ADMIN — BRIMONIDINE TARTRATE 1 DROP(S): 1.5 SOLUTION/ DROPS OPHTHALMIC at 16:47

## 2025-05-19 RX ADMIN — AMLODIPINE BESYLATE 2.5 MILLIGRAM(S): 10 TABLET ORAL at 05:26

## 2025-05-19 RX ADMIN — Medication 4 MILLIGRAM(S): at 10:30

## 2025-05-19 RX ADMIN — BRIMONIDINE TARTRATE 1 DROP(S): 1.5 SOLUTION/ DROPS OPHTHALMIC at 05:27

## 2025-05-19 RX ADMIN — Medication 100 MICROGRAM(S): at 05:26

## 2025-05-19 RX ADMIN — OXYBUTYNIN CHLORIDE 5 MILLIGRAM(S): 5 TABLET, FILM COATED, EXTENDED RELEASE ORAL at 12:22

## 2025-05-19 NOTE — PHARMACOTHERAPY INTERVENTION NOTE - INTERVENTION TYPE MED REC
this is [facility] case and I will update her RTW forms today.  Patient denies fever or chills, N/T or calf pain.     General: Alert and oriented x 3, NAD, respirations easy and unlabored with no audible wheezes, skin warm and dry, speech and dress appropriate for noted age, affect euthymic.     Musculoskeletal: RLE  incisions c/d/I with decreased swelling noted to foot and ankle  compartments soft  no calf tenderness  sensation intact to light touch  motor intact including TA/GS/EHL  palpable DP/PT pulses 2+     X-ray: Images of R tibia and R ankle reviewed personally by me today and reveal maintenance of alignment of R ankle fusion with hardware in position and interval callus forming to tibiotalar ankle fusion    IMP:  Problem List Items Addressed This Visit       Post-traumatic arthritis of right ankle - Primary    Relevant Orders    XR ankle right 3+ views    XR tibia fibula right 2 views    Status post ankle arthrodesis          PLAN:  She was placed into an Aircast walker boot today and may PWB 50% RLE in boot. She is going to attend PT sessions at [facility] so I hand-wrote her PT referral.  No tibiotalar ankle motion in therapy d/t fusion, but okay for subtalar, foot, knee and hip motion. She may RTW remotely starting on 1/15/2024 and paperwork completed today with restrictions, including no driving. I will see her back in 4-5 weeks and I need left ankle x-rays at that time. All questions were answered today.   
Med Rec - Admission

## 2025-05-19 NOTE — DISCHARGE NOTE PROVIDER - ATTENDING DISCHARGE PHYSICAL EXAMINATION:
GENERAL: No acute distress, comfortably in bed  HEAD: Atraumatic, normocephalic  ENT: PERRLA B/L, non-icteric sclera, no JVD  LUNGS: CTAB, no wrr, non-labored breathing  HEART: RRR, no murmur appreciated  ABD: Soft, non-tender, non-distended, no organomegaly, no appreciable masses, +bs all 4 quadrants  EXTREMITIES: Nontender, no clubbing, cyanosis, or edema  SKIN: Warm, dry, no rashes present  NEURO: A&Ox3, no focal deficits, moving all extremities spontaneously, no dysarthria, CN II-XII grossly intact  PSYCH: Normal affect, calm     Vital Signs Last 24 Hrs  T(C): 36.6 (23 May 2025 09:01), Max: 37.1 (22 May 2025 15:30)  T(F): 97.8 (23 May 2025 09:01), Max: 98.7 (22 May 2025 15:30)  HR: 74 (23 May 2025 09:01) (74 - 84)  BP: 106/65 (23 May 2025 09:01) (102/63 - 126/70)  BP(mean): --  RR: 18 (23 May 2025 09:01) (16 - 18)  SpO2: 96% (23 May 2025 09:01) (94% - 96%)    Parameters below as of 23 May 2025 09:01  Patient On (Oxygen Delivery Method): room air

## 2025-05-19 NOTE — PROGRESS NOTE ADULT - ASSESSMENT
89F hx of hypothyroidism, HTN, HLD, OAB presenting to the hospital after mechanical fall. Patient with right humeral neck fracture.       1-R humeral neck fracture  ortho consulted   NWB to [facility]   -pain control: ibuprofen, oxycodone based on pain scale  -PT consulted: rec for [facility]   DC in 2 days likely need 3 days stay     2-HTN  -c/w amlodipine  -c/w statin    3-OAB  -c/w oxybutynin   -check UA, start abx if positive    4-hypothyroidism  -c/w synthroid    diet: dash  dvt ppx: scd

## 2025-05-19 NOTE — DISCHARGE NOTE PROVIDER - NSDCFUADDINST_GEN_ALL_CORE_FT
Non-weightbearing RUE in sling at all times. Follow-up with orthopedics outpatient in 1-2 weeks.  ORTHOPEDIC RECOMMENDATIONS: There will be no acute orthopedic surgical intervention at this time. Patient will be non weight bearing of right upper extremity. Sling to right upper extremity. Pain control. DVTP per primary team. Follow-up in the office in 1-2 weeks for re-evaluation.

## 2025-05-19 NOTE — DISCHARGE NOTE PROVIDER - PROVIDER TOKENS
PROVIDER:[TOKEN:[[id number]]] PROVIDER:[TOKEN:[[id number]],FOLLOWUP:[1 week]] PROVIDER:[TOKEN:[[id number]],FOLLOWUP:[1 week]],FREE:[LAST:[PCP],PHONE:[(   )    -],FAX:[(   )    -]]

## 2025-05-19 NOTE — DISCHARGE NOTE PROVIDER - NSDCMRMEDTOKEN_GEN_ALL_CORE_FT
alendronate 70 mg oral tablet: 1 tab(s) orally once a week on Thursdays  amLODIPine 2.5 mg oral tablet: 1 tab(s) orally once a day  aspirin 81 mg oral delayed release tablet: 1 tab(s) orally once a day  brimonidine 0.15% ophthalmic solution: 1 drop(s) in each affected eye 2 times a day  cholestyramine 4 g/8.3 g oral powder for reconstitution: 4 gram(s) orally every other day  cycloSPORINE 0.05% ophthalmic emulsion: 1 drop(s) in each affected eye 2 times a day  latanoprost 0.005% ophthalmic solution: 1 drop(s) to each affected eye once a day (at bedtime)  levothyroxine 100 mcg (0.1 mg) oral tablet: 1 tab(s) orally once a day  mupirocin 2% topical ointment: Apply topically to affected area once a day  oxybutynin 5 mg/24 hours oral tablet, extended release: 1 tab(s) orally once a day with dinner  simvastatin 20 mg oral tablet: 1 tab(s) orally every other day  Systane Complete Optimal Dry Eye Relief ophthalmic solution: 1 drop(s) in each eye 2 times a day   alendronate 70 mg oral tablet: 1 tab(s) orally once a week on Thursdays  aspirin 81 mg oral delayed release tablet: 1 tab(s) orally once a day  atorvastatin 10 mg oral tablet: 1 tab(s) orally once a day (at bedtime)  brimonidine 0.15% ophthalmic solution: 1 drop(s) in each affected eye 2 times a day  cholestyramine 4 g/8.3 g oral powder for reconstitution: 4 gram(s) orally every other day  Cipro 250 mg oral tablet: 1 tab(s) orally 2 times a day for 3 days  levothyroxine 100 mcg (0.1 mg) oral tablet: 1 tab(s) orally once a day  oxybutynin 5 mg/24 hours oral tablet, extended release: 1 tab(s) orally once a day with dinner  oxyCODONE 5 mg oral tablet: 0.5 tab(s) orally 3 times a day as needed for arm pain  simvastatin 20 mg oral tablet: 1 tab(s) orally every other day   acetaminophen 325 mg oral tablet: 2 tab(s) orally every 6 hours As needed Moderate Pain (4 - 6)  alendronate 70 mg oral tablet: 1 tab(s) orally once a week on Thursdays  aspirin 81 mg oral delayed release tablet: 1 tab(s) orally once a day  brimonidine 0.15% ophthalmic solution: 1 drop(s) in each affected eye 2 times a day  cholestyramine 4 g/8.3 g oral powder for reconstitution: 4 gram(s) orally every other day  levothyroxine 100 mcg (0.1 mg) oral tablet: 1 tab(s) orally once a day  oxybutynin 5 mg/24 hours oral tablet, extended release: 1 tab(s) orally once a day with dinner  oxyCODONE 5 mg oral tablet: 0.5 tab(s) orally 3 times a day as needed for arm pain  simvastatin 20 mg oral tablet: 1 tab(s) orally every other day   acetaminophen 325 mg oral tablet: 2 tab(s) orally every 6 hours As needed Moderate Pain (4 - 6)  alendronate 70 mg oral tablet: 1 tab(s) orally once a week on Thursdays  aspirin 81 mg oral delayed release tablet: 1 tab(s) orally once a day  brimonidine 0.15% ophthalmic solution: 1 drop(s) in each affected eye 2 times a day  cholestyramine 4 g/8.3 g oral powder for reconstitution: 4 gram(s) orally every other day  levothyroxine 100 mcg (0.1 mg) oral tablet: 1 tab(s) orally once a day  naloxone 4 mg/0.1 mL nasal spray: 1 spray(s) intranasally once as needed for opioid over dose , give for opioid over dose  oxybutynin 5 mg/24 hours oral tablet, extended release: 1 tab(s) orally once a day with dinner  oxyCODONE 5 mg oral tablet: 1 tab(s) orally 3 times a day as needed for  severe pain  simvastatin 20 mg oral tablet: 1 tab(s) orally every other day

## 2025-05-19 NOTE — DISCHARGE NOTE PROVIDER - NSDCCPCAREPLAN_GEN_ALL_CORE_FT
PRINCIPAL DISCHARGE DIAGNOSIS  Diagnosis: Humeral head fracture  Assessment and Plan of Treatment: ortho consulted, NWB to RUE, pain control      SECONDARY DISCHARGE DIAGNOSES  Diagnosis: Constipation  Assessment and Plan of Treatment: miralax , senna, MOM PRN    Diagnosis: HTN (hypertension)  Assessment and Plan of Treatment: soft BP, off Amlodipine    Diagnosis: OAB (overactive bladder)  Assessment and Plan of Treatment: c/w oxybutynin    Diagnosis: Hypothyroidism  Assessment and Plan of Treatment: c/w synthroid     PRINCIPAL DISCHARGE DIAGNOSIS  Diagnosis: Humeral head fracture  Assessment and Plan of Treatment: This means you broke the top part of your right upper arm bone, very close to your shoulder, which happened when you fell.  We took X-rays to see the break clearly. The bone specialists (Orthopedics team) reviewed your case and decided that you do not need surgery for this type of break. To help it heal and to make you more comfortable, we have given you a sling to wear on your right arm. It's very important that you do not put any weight on your right arm and keep it in the sling as instructed. We also gave you pain medication (Tylenol and sometimes oxycodone) to help manage any discomfort. The physical therapy team has recommended that you go to a skilled nursing and rehabilitation ([facility]) facility after leaving the hospital. This will help you with your recovery and allow you to work on regaining strength and movement safely. You will need to make a follow-up appointment with the Orthopedics doctor, Dr. Lima, in their office to check on how your arm is healing. Please call their office to schedule this.      SECONDARY DISCHARGE DIAGNOSES  Diagnosis: Constipation  Assessment and Plan of Treatment: This means you were having difficulty passing bowel movements regularly while you were in the hospital. This can happen for various reasons, including being less active than usual or as a side effect of some pain medications.  To help you with this, we gave you some laxative medications (like Miralax and Senna). If you experience constipation again at the rehabilitation facility or at home, please let your nurses or doctors know. They can provide further advice or medication if needed. Drinking enough fluids and eating foods with fiber can also help prevent constipation.    Diagnosis: HTN (hypertension)  Assessment and Plan of Treatment: soft BP, off Amlodipine    Diagnosis: OAB (overactive bladder)  Assessment and Plan of Treatment: c/w oxybutynin    Diagnosis: Hypothyroidism  Assessment and Plan of Treatment: c/w synthroid    Diagnosis: Diarrhea  Assessment and Plan of Treatment: After taking the medications for constipation, you started to have loose and more frequent bowel movements (diarrhea), along with some discomfort in your abdomen.  We stopped the laxative medications, as we believe these were the cause of your diarrhea. A CT scan of your abdomen and pelvis was done to check for any serious issues, and it showed fluid in your bowels but no blockage. This scan also happened to show a new small spot in your colon. The specialists for stomach and bowel problems (Gastroenterology team) saw you and agreed that the diarrhea was likely due to the laxatives and should get better now that those medicines have been stopped. Regarding the spot found in your colon, they have recommended that you follow up with your regular GI doctor, Dr. Elizabeth, as an outpatient after you leave the rehabilitation facility. You can then discuss with him whether a colonoscopy (a camera test to look inside your colon) is needed, especially since your last one in November 2022 was normal. If your diarrhea continues or gets worse, please make sure to tell your medical team.

## 2025-05-19 NOTE — DISCHARGE NOTE PROVIDER - HOSPITAL COURSE
89F hx of hypothyroidism, HTN, HLD, OAB presenting to the hospital after mechanical fall. Patient with right humeral neck fracture. Ortho consulted. NWB to Sammy JACOBS for comfort  pain control: ibuprofen, oxycodone based on pain scale. PT consulted: reccomended for [facility]. BP soft, Amlodipine dced. Started Miralax , Senna, MOM.  OAB on Oxybutynin.  UA negative. Hypothyroidism on Synthroid.      Dispo:.Medically stable for DC [facility]       89F hx of hypothyroidism, HTN, HLD, OAB presenting to the hospital after mechanical fall. Patient with right humeral neck fracture. Ortho consulted. NWB to Sammy JACOBS for comfort  pain control: ibuprofen, oxycodone based on pain scale. PT consulted: reccomended for [facility]. BP soft, Amlodipine dced. Started Miralax , Senna, MOM.  OAB on Oxybutynin.  UA negative. Hypothyroidism on Synthroid.             Pt is a 89F with PMH of hypothyroidism, HTN, HLD, OAB who presented s/p mechanical fall resulting in RUE injury. Initial VS were T 36.7C, HR 84, /73, RR 18, SpO2 96% on RA. Initial labs showed gluc 149, WBC 13.31, Cr 0.41, and Hb 13.5; coags were WNL. Imaging including XR R shoulder/elbow and CXR revealed a transverse, mildly impacted and mildly comminuted R humeral neck fx. CT C-spine and head were negative for acute fx or ICH.    The R humeral neck fx was managed non-operatively with a sling, NWB to RUE, and pain control with ibuprofen and oxycodone PRN. During the hospital course, pt initially experienced constipation, which was managed with laxatives. This subsequently led to diarrhea and abd discomfort. A CT A/P obtained to evaluate these sx showed fluid-filled bowel without SBO, but incidentally noted a new lesion in the mid transverse colon suspicious for neoplasm. GI was consulted; they attributed the diarrhea to laxatives (which were d/c'd) and recommended outpt colonoscopy f/u for the colonic lesion. The pt also developed hypotension during the stay; amlodipine was d/c'd, and she required IVF boluses and maintenance fluids, with subsequent improvement in BP. A positive UA prompted empiric IV Rocephin for 3 days for a presumed asymptomatic UTI; urine cx neg but collected after abx.     Patient was explained hospital course, risks and benefits of treatment, and discharge planning, along with follow-up. Patient expresses understanding of all of the above. Patient is medically stable for discharge with appropriate followup.

## 2025-05-19 NOTE — DISCHARGE NOTE PROVIDER - CARE PROVIDERS DIRECT ADDRESSES
[email or web address]

## 2025-05-19 NOTE — DISCHARGE NOTE PROVIDER - CARE PROVIDER_API CALL
Mega Lima  Orthopaedic Surgery  [address] [facility], [address]  [place] [address]  Phone: [phone]  Fax: [phone]  Follow Up Time:    Mega Lima  Orthopaedic Surgery  [address] [facility], [address] [address]  Phone: [phone]  Fax: [phone]  Follow Up Time: 1 week   Mega Lima  Orthopaedic Surgery  [address] [facility], [address]  [place] [address]  Phone: [phone]  Fax: [phone]  Follow Up Time: 1 week    PCP,   Phone: (   )    -  Fax: (   )    -  Follow Up Time:

## 2025-05-19 NOTE — DISCHARGE NOTE PROVIDER - DETAILS OF MALNUTRITION DIAGNOSIS/DIAGNOSES
This patient has been assessed with a concern for Malnutrition and was treated during this hospitalization for the following Nutrition diagnosis/diagnoses:     -  05/23/2025: Severe protein-calorie malnutrition   -  05/23/2025: Underweight (BMI < 19)

## 2025-05-19 NOTE — DISCHARGE NOTE PROVIDER - NSDCFUADDAPPT_GEN_ALL_CORE_FT
ORTHOPEDIC RECOMMENDATIONS: There will be no acute orthopedic surgical intervention at this time. Patient will be non weight bearing of right upper extremity. Sling to right upper extremity. Pain control. DVTP per primary team. Follow-up in the office in 1-2 weeks for re-evaluation. ORTHOPEDIC RECOMMENDATIONS: There will be no acute orthopedic surgical intervention at this time. Patient will be non weight bearing of right upper extremity. Sling to right upper extremity. Pain control. DVTP per primary team. Follow-up in the office in 1-2 weeks for re-evaluation.    APPTS ARE READY TO BE MADE: [X] YES    Best Family or Patient Contact (if needed):    Additional Information about above appointments (if needed):    1: Ortho  2:   3:     Other comments or requests:  ORTHOPEDIC RECOMMENDATIONS: There will be no acute orthopedic surgical intervention at this time. Patient will be non weight bearing of right upper extremity. Sling to right upper extremity. Pain control. DVTP per primary team. Follow-up in the office in 1-2 weeks for re-evaluation.    APPTS ARE READY TO BE MADE: [X] YES    Best Family or Patient Contact (if needed):    Additional Information about above appointments (if needed):    1: Ortho  2:   3:     Other comments or requests:     Patient is being transferred. Caregiver will arrange follow up.

## 2025-05-20 LAB
APPEARANCE UR: ABNORMAL
BACTERIA # UR AUTO: ABNORMAL /HPF
BILIRUB UR-MCNC: NEGATIVE — SIGNIFICANT CHANGE UP
COLOR SPEC: YELLOW — SIGNIFICANT CHANGE UP
DIFF PNL FLD: ABNORMAL
GLUCOSE UR QL: NEGATIVE MG/DL — SIGNIFICANT CHANGE UP
HCT VFR BLD CALC: 37.3 % — SIGNIFICANT CHANGE UP (ref 34.5–45)
HGB BLD-MCNC: 11.7 G/DL — SIGNIFICANT CHANGE UP (ref 11.5–15.5)
KETONES UR QL: ABNORMAL MG/DL
LEUKOCYTE ESTERASE UR-ACNC: ABNORMAL
MCHC RBC-ENTMCNC: 29.1 PG — SIGNIFICANT CHANGE UP (ref 27–34)
MCHC RBC-ENTMCNC: 31.4 G/DL — LOW (ref 32–36)
MCV RBC AUTO: 92.8 FL — SIGNIFICANT CHANGE UP (ref 80–100)
NITRITE UR-MCNC: NEGATIVE — SIGNIFICANT CHANGE UP
NRBC # BLD AUTO: 0 K/UL — SIGNIFICANT CHANGE UP (ref 0–0)
NRBC # FLD: 0 K/UL — SIGNIFICANT CHANGE UP (ref 0–0)
NRBC BLD AUTO-RTO: 0 /100 WBCS — SIGNIFICANT CHANGE UP (ref 0–0)
PH UR: 6 — SIGNIFICANT CHANGE UP (ref 5–8)
PLATELET # BLD AUTO: 118 K/UL — LOW (ref 150–400)
PMV BLD: 11.7 FL — SIGNIFICANT CHANGE UP (ref 7–13)
PROT UR-MCNC: 100 MG/DL
RBC # BLD: 4.02 M/UL — SIGNIFICANT CHANGE UP (ref 3.8–5.2)
RBC # FLD: 13.8 % — SIGNIFICANT CHANGE UP (ref 10.3–14.5)
RBC CASTS # UR COMP ASSIST: 1 /HPF — SIGNIFICANT CHANGE UP (ref 0–4)
SP GR SPEC: 1.02 — SIGNIFICANT CHANGE UP (ref 1–1.03)
SQUAMOUS # UR AUTO: 5 /HPF — SIGNIFICANT CHANGE UP (ref 0–5)
UROBILINOGEN FLD QL: 1 MG/DL — SIGNIFICANT CHANGE UP (ref 0.2–1)
WBC # BLD: 9.79 K/UL — SIGNIFICANT CHANGE UP (ref 3.8–10.5)
WBC # FLD AUTO: 9.79 K/UL — SIGNIFICANT CHANGE UP (ref 3.8–10.5)
WBC UR QL: 676 /HPF — HIGH (ref 0–5)

## 2025-05-20 PROCEDURE — 74018 RADEX ABDOMEN 1 VIEW: CPT | Mod: 26

## 2025-05-20 PROCEDURE — 99232 SBSQ HOSP IP/OBS MODERATE 35: CPT

## 2025-05-20 RX ORDER — CEFTRIAXONE 500 MG/1
INJECTION, POWDER, FOR SOLUTION INTRAMUSCULAR; INTRAVENOUS
Refills: 0 | Status: COMPLETED | OUTPATIENT
Start: 2025-05-20 | End: 2025-05-22

## 2025-05-20 RX ORDER — POLYETHYLENE GLYCOL 3350 17 G/17G
17 POWDER, FOR SOLUTION ORAL DAILY
Refills: 0 | Status: DISCONTINUED | OUTPATIENT
Start: 2025-05-20 | End: 2025-05-21

## 2025-05-20 RX ORDER — MAGNESIUM HYDROXIDE 400 MG/5ML
30 SUSPENSION ORAL DAILY
Refills: 0 | Status: DISCONTINUED | OUTPATIENT
Start: 2025-05-20 | End: 2025-05-22

## 2025-05-20 RX ORDER — CEFTRIAXONE 500 MG/1
1000 INJECTION, POWDER, FOR SOLUTION INTRAMUSCULAR; INTRAVENOUS EVERY 24 HOURS
Refills: 0 | Status: COMPLETED | OUTPATIENT
Start: 2025-05-21 | End: 2025-05-22

## 2025-05-20 RX ORDER — ENOXAPARIN SODIUM 100 MG/ML
40 INJECTION SUBCUTANEOUS EVERY 24 HOURS
Refills: 0 | Status: DISCONTINUED | OUTPATIENT
Start: 2025-05-20 | End: 2025-05-23

## 2025-05-20 RX ORDER — CEFTRIAXONE 500 MG/1
1000 INJECTION, POWDER, FOR SOLUTION INTRAMUSCULAR; INTRAVENOUS ONCE
Refills: 0 | Status: COMPLETED | OUTPATIENT
Start: 2025-05-20 | End: 2025-05-20

## 2025-05-20 RX ADMIN — POLYETHYLENE GLYCOL 3350 17 GRAM(S): 17 POWDER, FOR SOLUTION ORAL at 15:53

## 2025-05-20 RX ADMIN — ATORVASTATIN CALCIUM 10 MILLIGRAM(S): 80 TABLET, FILM COATED ORAL at 21:01

## 2025-05-20 RX ADMIN — BRIMONIDINE TARTRATE 1 DROP(S): 1.5 SOLUTION/ DROPS OPHTHALMIC at 05:40

## 2025-05-20 RX ADMIN — Medication 81 MILLIGRAM(S): at 21:01

## 2025-05-20 RX ADMIN — Medication 100 MICROGRAM(S): at 05:39

## 2025-05-20 RX ADMIN — Medication 500 MILLILITER(S): at 21:07

## 2025-05-20 RX ADMIN — OXYBUTYNIN CHLORIDE 5 MILLIGRAM(S): 5 TABLET, FILM COATED, EXTENDED RELEASE ORAL at 10:37

## 2025-05-20 RX ADMIN — AMLODIPINE BESYLATE 2.5 MILLIGRAM(S): 10 TABLET ORAL at 05:39

## 2025-05-20 RX ADMIN — ENOXAPARIN SODIUM 40 MILLIGRAM(S): 100 INJECTION SUBCUTANEOUS at 17:59

## 2025-05-20 RX ADMIN — LATANOPROST PF 1 DROP(S): 0.05 SOLUTION/ DROPS OPHTHALMIC at 21:01

## 2025-05-20 RX ADMIN — MAGNESIUM HYDROXIDE 30 MILLILITER(S): 400 SUSPENSION ORAL at 15:53

## 2025-05-20 RX ADMIN — CEFTRIAXONE 1000 MILLIGRAM(S): 500 INJECTION, POWDER, FOR SOLUTION INTRAMUSCULAR; INTRAVENOUS at 21:18

## 2025-05-20 RX ADMIN — BRIMONIDINE TARTRATE 1 DROP(S): 1.5 SOLUTION/ DROPS OPHTHALMIC at 18:01

## 2025-05-20 NOTE — PROGRESS NOTE ADULT - ASSESSMENT
89F hx of hypothyroidism, HTN, HLD, OAB presenting to the hospital after mechanical fall. Patient with right humeral neck fracture.       1-R humeral neck fracture  ortho consulted   NWB to REYNALDO   -pain control: ibuprofen, oxycodone based on pain scale  -PT consulted: rec for RODRIGO   DC in 24 hours need 3 days stay in the hosp     2-HTN  -c/w amlodipine  -c/w statin    3- Constipation   start miralax , senna   add MOM   if no BM in am will try lactulose   KUB ordered     4-OAB  -c/w oxybutynin   -check UA, start abx if positive    5-hypothyroidism  -c/w synthroid    diet: dash  dvt ppx: lvenox 40 mg sq     Dc in am      89F hx of hypothyroidism, HTN, HLD, OAB presenting to the hospital after mechanical fall. Patient with right humeral neck fracture.       1-R humeral neck fracture  ortho consulted   NWB to REYNALDO   -pain control: ibuprofen, oxycodone based on pain scale  -PT consulted: rec for RODRIGO   DC in 24 hours need 3 days stay in the hosp     2-h/o HTN  soft BP   DC amlodipine     3- Constipation   start miralax , senna   add MOM   if no BM in am will try lactulose   KUB ordered     4-OAB  -c/w oxybutynin   -check UA, start abx if positive    5-hypothyroidism  -c/w synthroid    diet: dash  dvt ppx: lvenox 40 mg sq     Dc in am

## 2025-05-20 NOTE — CHART NOTE - NSCHARTNOTEFT_GEN_A_CORE
89F hx of hypothyroidism, HTN, HLD, OAB presenting to the hospital after mechanical fall. Patient found to have right humeral neck fracture.   Called by RN, patient with BP 82/58 MAP 66   Patient without complaints, denies dizziness   500cc bolus x1   STAT CBC   UA is positive, started on Rocephin   RN to notify with any acute changes

## 2025-05-21 ENCOUNTER — TRANSCRIPTION ENCOUNTER (OUTPATIENT)
Age: 89
End: 2025-05-21

## 2025-05-21 LAB
ANION GAP SERPL CALC-SCNC: 13 MMOL/L — SIGNIFICANT CHANGE UP (ref 5–17)
BUN SERPL-MCNC: 19.8 MG/DL — SIGNIFICANT CHANGE UP (ref 8–20)
CALCIUM SERPL-MCNC: 8.4 MG/DL — SIGNIFICANT CHANGE UP (ref 8.4–10.5)
CHLORIDE SERPL-SCNC: 101 MMOL/L — SIGNIFICANT CHANGE UP (ref 96–108)
CO2 SERPL-SCNC: 23 MMOL/L — SIGNIFICANT CHANGE UP (ref 22–29)
CREAT SERPL-MCNC: 0.45 MG/DL — LOW (ref 0.5–1.3)
EGFR: 90 ML/MIN/1.73M2 — SIGNIFICANT CHANGE UP
EGFR: 90 ML/MIN/1.73M2 — SIGNIFICANT CHANGE UP
FOLATE SERPL-MCNC: 18 NG/ML — SIGNIFICANT CHANGE UP
GLUCOSE SERPL-MCNC: 109 MG/DL — HIGH (ref 70–99)
HCT VFR BLD CALC: 39.5 % — SIGNIFICANT CHANGE UP (ref 34.5–45)
HGB BLD-MCNC: 12.5 G/DL — SIGNIFICANT CHANGE UP (ref 11.5–15.5)
MAGNESIUM SERPL-MCNC: 2.2 MG/DL — SIGNIFICANT CHANGE UP (ref 1.6–2.6)
MCHC RBC-ENTMCNC: 29.1 PG — SIGNIFICANT CHANGE UP (ref 27–34)
MCHC RBC-ENTMCNC: 31.6 G/DL — LOW (ref 32–36)
MCV RBC AUTO: 92.1 FL — SIGNIFICANT CHANGE UP (ref 80–100)
NRBC # BLD AUTO: 0 K/UL — SIGNIFICANT CHANGE UP (ref 0–0)
NRBC # FLD: 0 K/UL — SIGNIFICANT CHANGE UP (ref 0–0)
NRBC BLD AUTO-RTO: 0 /100 WBCS — SIGNIFICANT CHANGE UP (ref 0–0)
PHOSPHATE SERPL-MCNC: 2.9 MG/DL — SIGNIFICANT CHANGE UP (ref 2.4–4.7)
PLATELET # BLD AUTO: 214 K/UL — SIGNIFICANT CHANGE UP (ref 150–400)
PMV BLD: 11.6 FL — SIGNIFICANT CHANGE UP (ref 7–13)
POTASSIUM SERPL-MCNC: 3.7 MMOL/L — SIGNIFICANT CHANGE UP (ref 3.5–5.3)
POTASSIUM SERPL-SCNC: 3.7 MMOL/L — SIGNIFICANT CHANGE UP (ref 3.5–5.3)
RBC # BLD: 4.29 M/UL — SIGNIFICANT CHANGE UP (ref 3.8–5.2)
RBC # FLD: 13.8 % — SIGNIFICANT CHANGE UP (ref 10.3–14.5)
SODIUM SERPL-SCNC: 137 MMOL/L — SIGNIFICANT CHANGE UP (ref 135–145)
TSH SERPL-MCNC: 0.77 UIU/ML — SIGNIFICANT CHANGE UP (ref 0.27–4.2)
VIT B12 SERPL-MCNC: 266 PG/ML — SIGNIFICANT CHANGE UP (ref 232–1245)
WBC # BLD: 10.63 K/UL — HIGH (ref 3.8–10.5)
WBC # FLD AUTO: 10.63 K/UL — HIGH (ref 3.8–10.5)

## 2025-05-21 PROCEDURE — 99232 SBSQ HOSP IP/OBS MODERATE 35: CPT

## 2025-05-21 RX ORDER — CYANOCOBALAMIN 1000 UG/ML
1000 INJECTION INTRAMUSCULAR; SUBCUTANEOUS DAILY
Refills: 0 | Status: DISCONTINUED | OUTPATIENT
Start: 2025-05-21 | End: 2025-05-23

## 2025-05-21 RX ORDER — MUPIROCIN CALCIUM 20 MG/G
1 CREAM TOPICAL
Refills: 0 | DISCHARGE

## 2025-05-21 RX ORDER — LANOLIN/MINERAL OIL/PETROLATUM
1 OINTMENT (GRAM) OPHTHALMIC (EYE)
Refills: 0 | DISCHARGE

## 2025-05-21 RX ORDER — ATORVASTATIN CALCIUM 80 MG/1
1 TABLET, FILM COATED ORAL
Qty: 0 | Refills: 0 | DISCHARGE
Start: 2025-05-21

## 2025-05-21 RX ORDER — CYCLOSPORINE OPHTHALMIC SOLUTION 1 MG/ML
1 SOLUTION/ DROPS OPHTHALMIC
Refills: 0 | DISCHARGE

## 2025-05-21 RX ORDER — OXYCODONE HYDROCHLORIDE 30 MG/1
1 TABLET ORAL
Qty: 0 | Refills: 0 | DISCHARGE
Start: 2025-05-21

## 2025-05-21 RX ADMIN — OXYBUTYNIN CHLORIDE 5 MILLIGRAM(S): 5 TABLET, FILM COATED, EXTENDED RELEASE ORAL at 12:45

## 2025-05-21 RX ADMIN — BRIMONIDINE TARTRATE 1 DROP(S): 1.5 SOLUTION/ DROPS OPHTHALMIC at 18:23

## 2025-05-21 RX ADMIN — Medication 125 MILLILITER(S): at 22:04

## 2025-05-21 RX ADMIN — CEFTRIAXONE 1000 MILLIGRAM(S): 500 INJECTION, POWDER, FOR SOLUTION INTRAMUSCULAR; INTRAVENOUS at 22:04

## 2025-05-21 RX ADMIN — Medication 500 MILLILITER(S): at 19:54

## 2025-05-21 RX ADMIN — LATANOPROST PF 1 DROP(S): 0.05 SOLUTION/ DROPS OPHTHALMIC at 22:04

## 2025-05-21 RX ADMIN — ATORVASTATIN CALCIUM 10 MILLIGRAM(S): 80 TABLET, FILM COATED ORAL at 22:04

## 2025-05-21 RX ADMIN — Medication 100 MICROGRAM(S): at 05:04

## 2025-05-21 RX ADMIN — ENOXAPARIN SODIUM 40 MILLIGRAM(S): 100 INJECTION SUBCUTANEOUS at 18:23

## 2025-05-21 RX ADMIN — Medication 80 MILLILITER(S): at 18:34

## 2025-05-21 RX ADMIN — BRIMONIDINE TARTRATE 1 DROP(S): 1.5 SOLUTION/ DROPS OPHTHALMIC at 05:06

## 2025-05-21 NOTE — DISCHARGE NOTE NURSING/CASE MANAGEMENT/SOCIAL WORK - PATIENT PORTAL LINK FT
You can access the FollowMyHealth Patient Portal offered by [facility] by registering at the following website: [email or web address]. By joining [employer]’s FollowMyHealth portal, you will also be able to view your health information using other applications (apps) compatible with our system.

## 2025-05-21 NOTE — DISCHARGE NOTE NURSING/CASE MANAGEMENT/SOCIAL WORK - NSDCPEFALRISK_GEN_ALL_CORE
For information on Fall & Injury Prevention, visit: [email or web address] OR  [email or web address] OR  https://www.cdc.gov/steadi/patient.html

## 2025-05-21 NOTE — PROGRESS NOTE ADULT - ASSESSMENT
89F hx of hypothyroidism, HTN, HLD, OAB presenting to the hospital after mechanical fall. Patient with right humeral neck fracture.       1- Hypotension   amlodipine stopped   IVF bolus given early am   will repeated bolus , add maintance IVF   UA + r/o UTI no symptoms   empirical iv rocephin started   urine cx ordered     2- R humeral neck fracture  ortho consulted   NWB to RUE   -pain control: ibuprofen, oxycodone based on pain scale  -PT consulted: rec for RODRIGO   Dc if BP stable in 24 hours       3- Constipation   cont miralax prn   senna   + BM   KUB ? SBO   will get CT of abd with diarrhea today     4-OAB  -c/w oxybutynin   -check UA  start abx if positive    5-hypothyroidism  -c/w synthroid    diet: dash  dvt ppx: lvenox 40 mg sq     Dc in 24 hours if stable

## 2025-05-21 NOTE — DISCHARGE NOTE NURSING/CASE MANAGEMENT/SOCIAL WORK - NSDCFUADDAPPT_GEN_ALL_CORE_FT
ORTHOPEDIC RECOMMENDATIONS: There will be no acute orthopedic surgical intervention at this time. Patient will be non weight bearing of right upper extremity. Sling to right upper extremity. Pain control. DVTP per primary team. Follow-up in the office in 1-2 weeks for re-evaluation.    APPTS ARE READY TO BE MADE: [X] YES    Best Family or Patient Contact (if needed):    Additional Information about above appointments (if needed):    1: Ortho  2:   3:     Other comments or requests:

## 2025-05-21 NOTE — DISCHARGE NOTE NURSING/CASE MANAGEMENT/SOCIAL WORK - NSDCCRTYPESERV_GEN_ALL_CORE_FT
[de-identified] : 57 years old male with history of Chronic Sinusitis and PSH of Endoscopic Sinus surgery is present today for B/L sinus Persistent severe CRS sinus wash and steroid bathwash.  Subacute rehab

## 2025-05-21 NOTE — DISCHARGE NOTE NURSING/CASE MANAGEMENT/SOCIAL WORK - FINANCIAL ASSISTANCE
[facility] provides services at a reduced cost to those who are determined to be eligible through [facility]’s financial assistance program. Information regarding [facility]’s financial assistance program can be found by going to [email or web address] or by calling [phone].

## 2025-05-21 NOTE — DISCHARGE NOTE NURSING/CASE MANAGEMENT/SOCIAL WORK - NSDCVIVACCINE_GEN_ALL_CORE_FT
Tdap; 03-Mar-2023 21:47; Moise Jovel (RN); [employer]; [id number] (Exp. Date: 08-Dec-2024); IntraMuscular; Deltoid Right.; 0.5 milliLiter(s); VIS (VIS Published: 09-May-2013, VIS Presented: 03-Mar-2023);

## 2025-05-22 LAB
24R-OH-CALCIDIOL SERPL-MCNC: 26.8 NG/ML — SIGNIFICANT CHANGE UP
MRSA PCR RESULT.: SIGNIFICANT CHANGE UP
S AUREUS DNA NOSE QL NAA+PROBE: SIGNIFICANT CHANGE UP

## 2025-05-22 PROCEDURE — 99223 1ST HOSP IP/OBS HIGH 75: CPT

## 2025-05-22 PROCEDURE — 74176 CT ABD & PELVIS W/O CONTRAST: CPT | Mod: 26

## 2025-05-22 PROCEDURE — 99233 SBSQ HOSP IP/OBS HIGH 50: CPT

## 2025-05-22 RX ADMIN — BRIMONIDINE TARTRATE 1 DROP(S): 1.5 SOLUTION/ DROPS OPHTHALMIC at 04:51

## 2025-05-22 RX ADMIN — ENOXAPARIN SODIUM 40 MILLIGRAM(S): 100 INJECTION SUBCUTANEOUS at 17:02

## 2025-05-22 RX ADMIN — BRIMONIDINE TARTRATE 1 DROP(S): 1.5 SOLUTION/ DROPS OPHTHALMIC at 17:02

## 2025-05-22 RX ADMIN — Medication 81 MILLIGRAM(S): at 20:30

## 2025-05-22 RX ADMIN — OXYCODONE HYDROCHLORIDE 2.5 MILLIGRAM(S): 30 TABLET ORAL at 17:02

## 2025-05-22 RX ADMIN — Medication 100 MICROGRAM(S): at 04:51

## 2025-05-22 RX ADMIN — OXYBUTYNIN CHLORIDE 5 MILLIGRAM(S): 5 TABLET, FILM COATED, EXTENDED RELEASE ORAL at 10:12

## 2025-05-22 RX ADMIN — LATANOPROST PF 1 DROP(S): 0.05 SOLUTION/ DROPS OPHTHALMIC at 20:29

## 2025-05-22 RX ADMIN — ATORVASTATIN CALCIUM 10 MILLIGRAM(S): 80 TABLET, FILM COATED ORAL at 20:30

## 2025-05-22 RX ADMIN — CEFTRIAXONE 1000 MILLIGRAM(S): 500 INJECTION, POWDER, FOR SOLUTION INTRAMUSCULAR; INTRAVENOUS at 20:29

## 2025-05-22 RX ADMIN — CYANOCOBALAMIN 1000 MICROGRAM(S): 1000 INJECTION INTRAMUSCULAR; SUBCUTANEOUS at 10:11

## 2025-05-22 RX ADMIN — Medication 1 APPLICATION(S): at 17:02

## 2025-05-22 NOTE — PROGRESS NOTE ADULT - ASSESSMENT
89F hx of hypothyroidism, HTN, HLD, OAB presenting to the hospital after mechanical fall. Patient with right humeral neck fracture.       1- Diarrhea . abd pain   abnormal RENE   CT opf abd ordered   + colon lseion r/o neoplasm   son is updated   will d/w pt abour result and if she wants any agressive measures   GI PCR ordered   hold miralax , hold senna  monitor stool count , lytes     2-- Hypotension   amlodipine stopped   labile with some improment in BP after IVF boluses   cont hydration   check procalcitonin in am   cont to monitor     3- Suspected UTI   iv rocephin # 3 today will stop after 3rd dose   urine cx is pending     4- R humeral neck fracture  ortho consulted   NWB to RUE   eventual DC To RODRIGO       5- Constipation   resolved now with loose BM     6-OAB  -c/w oxybutynin   -check UA  start abx if positive    7-hypothyroidism  -c/w synthroid    8- h/o skin neurofibromatosis     diet: dash        DC in 2-3 days if BP and diarrhea improves    DNr / DNI   d/w son

## 2025-05-22 NOTE — CONSULT NOTE ADULT - SUBJECTIVE AND OBJECTIVE BOX
Chief Complaint:  Patient is a 89y old  Female who presents with a chief complaint of fall    HPI: 89F hx of hypothyroidism, HTN, HLD, OAB presenting to the hospital after mechanical fall. Patient states that two days prior she had tripped when walking (using rolling walker) at [employer]. Patient mostly fell on her right UE, denies having head trauma or LOC. Patient did not have any symptoms prior, denies lightheadedness, dizziness, chest pain, sob, palpitations prior to the event. In the ER she was found to have proximal humerus fracture. GI now asked to consult for abnormal CT imaging. Pt seen and examined. States she was originally constipated when she came in but was given laxatives and she is now having diarrhea. Her last dose of miralax was yesterday. CT Abdomen and Pelvis No Cont (25) showed New lesion of the mid transverse colon is suspicious for neoplasm. Follow-up colonoscopy recommended. She has followed with GI Dr. Elizabeth in the past. Her last colonoscopy was 11/10/22 showing 3 polyps (removed) and internal/external hemorrhoids. She last followed with him 3/2024. She denies nausea, vomiting, abdominal pain, melena or hematochezia.        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      Diverticulitis      H/O total knee replacement, right      S/P cholecystectomy  1964      History of appendectomy        H/O  section  1968      H/O cataract extraction  2018 b/l          REVIEW OF SYSTEMS:   General: Negative  HEENT: Negative  CV: Negative  Respiratory: Negative  GI: See HPI  : Negative  MSK: Negative  Hematologic: Negative  Skin: Negative    MEDICATIONS:   MEDICATIONS  (STANDING):  aspirin  chewable 81 milliGRAM(s) Oral <User Schedule>  atorvastatin 10 milliGRAM(s) Oral at bedtime  brimonidine 0.2% Ophthalmic Solution 1 Drop(s) Both EYES two times a day  cefTRIAXone Injectable.      cefTRIAXone Injectable. 1000 milliGRAM(s) IV Push every 24 hours  chlorhexidine 2% Cloths 1 Application(s) Topical <User Schedule>  cholestyramine Powder (Sugar-Free) 4 Gram(s) Oral once  cyanocobalamin Injectable 1000 MICROGram(s) IntraMuscular daily  enoxaparin Injectable 40 milliGRAM(s) SubCutaneous every 24 hours  latanoprost 0.005% Ophthalmic Solution 1 Drop(s) Both EYES at bedtime  levothyroxine 100 MICROGram(s) Oral daily  oxybutynin XL 5 milliGRAM(s) Oral daily    MEDICATIONS  (PRN):  ibuprofen  Tablet. 400 milliGRAM(s) Oral every 6 hours PRN Mild Pain (1 - 3), Moderate Pain (4 - 6)  oxyCODONE    IR 2.5 milliGRAM(s) Oral every 6 hours PRN Severe Pain (7 - 10)          DIET:  Diet, DASH/TLC:   Sodium & Cholesterol Restricted (25 @ 10:34) [Active]          ALLERGIES:   Allergies    Tylenol (Unknown)    Intolerances        Substance Use:   (  ) never used  (  ) other:  Tobacco Usage:  (   ) never smoked   (   ) former smoker   (   ) current smoker  (     ) pack year  (        ) last cigarette date  Alcohol Usage:    Family History   IBD (  ) Yes   (  ) No  GI Malignancy (  )  Yes    (  ) No    Health Management  Last Colonoscopy:  Last Endoscopy:     VITAL SIGNS:   Vital Signs Last 24 Hrs  T(C): 36.6 (22 May 2025 10:56), Max: 37.1 (21 May 2025 15:31)  T(F): 97.8 (22 May 2025 10:56), Max: 98.7 (21 May 2025 15:31)  HR: 75 (22 May 2025 10:56) (71 - 84)  BP: 96/67 (22 May 2025 10:56) (70/42 - 129/66)  BP(mean): 74 (22 May 2025 00:14) (74 - 74)  RR: 18 (22 May 2025 10:56) (18 - 19)  SpO2: 99% (22 May 2025 10:56) (93% - 99%)    Parameters below as of 22 May 2025 10:56  Patient On (Oxygen Delivery Method): room air      I&O's Summary      PHYSICAL EXAM:   GENERAL:  No acute distress  HEENT:  NC/AT, conjunctiva clear, sclera anicteric  CHEST:  No increased effort  HEART:  Regular rate  ABDOMEN:  Soft, non-tender, non-distended, normoactive bowel sounds, no rebound or guarding  EXTREMITIES: RUE bruising, in sling  SKIN:  Warm, dry  NEURO:  Calm, cooperative    LABS:                        12.5   10.63 )-----------( 214      ( 21 May 2025 13:00 )             39.5     Hemoglobin: 12.5 g/dL (25 @ 13:00)  Hemoglobin: 11.7 g/dL (25 @ 21:50)        137  |  101  |  19.8  ----------------------------<  109[H]  3.7   |  23.0  |  0.45[L]    Ca    8.4      21 May 2025 13:00  Phos  2.9       Mg     2.2               RADIOLOGY & ADDITIONAL STUDIES:      ACC: [id number] EXAM:  CT ABDOMEN AND PELVIS   ORDERED BY: ADRIEL MEEKS     PROCEDURE DATE:  2025          INTERPRETATION:  CLINICAL INFORMATION: Constipation. Clinical concern for   small bowel obstruction.    COMPARISON: CT abdomen and pelvis 2/3/2024, 2022.    CONTRAST/COMPLICATIONS:  IV Contrast: NONE  Oral Contrast: NONE  .    PROCEDURE:  CT of the Abdomen and Pelvis was performed.  Sagittal and coronal reformats were performed.    FINDINGS:  LOWER CHEST: Aortic and mitral valve calcifications. Decreased density of   the intracardiac blood pool, suggestive of anemia.    LIVER: Within normal limits.  BILE DUCTS: Intra and extrahepatic biliary ductal dilatation. Common bile   duct measures up to 1.4 cm, unchanged. Previously noted enhancing lesion   at the ampulla of Vater is not well-visualized on this noncontrast exam.  GALLBLADDER: Cholecystectomy.  SPLEEN: Within normal limits.  PANCREAS: Within normal limits.  ADRENALS: Within normal limits.  KIDNEYS/URETERS: Punctate nonobstructing stones in the right lower pole   and the left mid upper pole. No hydronephrosis.    BLADDER: Distended with wall trabeculation and multiple bladder   diverticula, similar compared to prior exam.  REPRODUCTIVE ORGANS: Small calcified uterine fibroid.    BOWEL/MESENTERY: Small hiatal hernia. Left mid abdomen lesion is again   noted in the small bowel although difficult to assess for change in the   absence of intravenous contrast. Additional previously seen hypervascular   lesions in the mid abdomen and right lower quadrant are not   well-visualized. New lesion of the mid transverse colon is suspicious for   neoplasm (5:17). No bowel obstruction. Appendix is not visualized. No   evidence of inflammation in the pericecal region.  PERITONEUM/RETROPERITONEUM: Within normal limits.  VESSELS: Atherosclerotic changes.  LYMPH NODES: No lymphadenopathy.  ABDOMINAL WALL: Foci of gas within abdominal wall, likely related to   injections. Postsurgical change. 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 Coarse   calcifications within gluteal subcutaneous tissues.  BONES: Degenerative changes. Chronic healed fracture deformity of the   left inferior pubic rami.    IMPRESSION:  1.  No bowel obstruction. Fluid throughout small and large bowel,   nonspecific.  2.  New lesion of the mid transverse colon is suspicious for neoplasm.   Follow-up colonoscopy recommended.    --- End of Report ---          JIM NETTLES MD; Fellow Radiology  This document has been electronically signed.  CHRISTINE OH DO; Attending Radiologist  This document has been electronically signed. May 22 2025  9:50AM  25 @ 08:20

## 2025-05-22 NOTE — CONSULT NOTE ADULT - ASSESSMENT
89F hx of hypothyroidism, HTN, HLD, OAB presenting to the hospital after mechanical fall. Patient states that two days prior she had tripped when walking (using rolling walker) at [employer]. Patient mostly fell on her right UE, denies having head trauma or LOC. Patient did not have any symptoms prior, denies lightheadedness, dizziness, chest pain, sob, palpitations prior to the event. In the ER she was found to have proximal humerus fracture. GI now asked to consult for abnormal CT imaging.    #diarrhea   #abnormal imaging   CT Abdomen and Pelvis No Cont (05.22.25) showed New lesion of the mid transverse colon is suspicious for neoplasm. Follow-up colonoscopy recommended.  Her last colonoscopy was 11/10/22 showing 3 polyps (removed) and internal/external hemorrhoids.   -Trend CBC and BMP while inpatient   -Diarrhea likely from laxatives  -Given pt has had colonoscopy within the last 5 years that did not show abnormal lesions, the change of developing a neoplasm is low. Also pt would likely have a hard time doing a bowel prep right now given her location in the ER and the humerus fracture.   -Regular diet as tolerated  -Pt can follow up outpatient with Dr. Elizabeth to discuss colonoscopy   -No plans for urgent or emergent procedures at this time  -Further care per primary team   pt is in agreement with plan   GI will sign off now. Please call us back with any questions or concern.   _________________________________________________________________  Assessment and recommendations are final when note is signed by the attending physician.  89F hx of hypothyroidism, HTN, HLD, OAB presenting to the hospital after mechanical fall. Patient states that two days prior she had tripped when walking (using rolling walker) at [employer]. Patient mostly fell on her right UE, denies having head trauma or LOC. Patient did not have any symptoms prior, denies lightheadedness, dizziness, chest pain, sob, palpitations prior to the event. In the ER she was found to have proximal humerus fracture. GI now asked to consult for abnormal CT imaging.    #diarrhea   #abnormal imaging   CT Abdomen and Pelvis No Cont (05.22.25) showed New lesion of the mid transverse colon is suspicious for neoplasm. Follow-up colonoscopy recommended.  Her last colonoscopy was 11/10/22 showing 3 polyps (removed) and internal/external hemorrhoids.   -Trend CBC and BMP while inpatient   -Diarrhea likely from laxatives  -Given pt has had colonoscopy within the last 5 years that did not show abnormal lesions, the chance of developing a neoplasm is low. Also pt would likely have a hard time doing a bowel prep right now given her location in the ER and the humerus fracture.   -Regular diet as tolerated  -Pt can follow up outpatient with Dr. Elizabeth to discuss colonoscopy   -No plans for urgent or emergent procedures at this time  -Further care per primary team   pt is in agreement with plan   GI will sign off now. Please call us back with any questions or concern.   _________________________________________________________________  Assessment and recommendations are final when note is signed by the attending physician.

## 2025-05-22 NOTE — PROGRESS NOTE ADULT - SUBJECTIVE AND OBJECTIVE BOX
Patient is a 89y old  Female who presents with a chief complaint of humerus fx     discharge planing to [facility]   she is seen in am , feeling well   pain controlled         ALLERGIES:  Tylenol (Unknown)    MEDICATIONS  (STANDING):  amLODIPine   Tablet 2.5 milliGRAM(s) Oral daily  aspirin  chewable 81 milliGRAM(s) Oral <User Schedule>  atorvastatin 10 milliGRAM(s) Oral at bedtime  brimonidine 0.2% Ophthalmic Solution 1 Drop(s) Both EYES two times a day  cholestyramine Powder (Sugar-Free) 4 Gram(s) Oral once  latanoprost 0.005% Ophthalmic Solution 1 Drop(s) Both EYES at bedtime  levothyroxine 100 MICROGram(s) Oral daily  oxybutynin XL 5 milliGRAM(s) Oral daily    MEDICATIONS  (PRN):  ibuprofen  Tablet. 400 milliGRAM(s) Oral every 6 hours PRN Mild Pain (1 - 3), Moderate Pain (4 - 6)  oxyCODONE    IR 2.5 milliGRAM(s) Oral every 6 hours PRN Severe Pain (7 - 10)    Vital Signs Last 24 Hrs  T(F): 98.2 (19 May 2025 11:08), Max: 98.8 (18 May 2025 18:04)  HR: 93 (19 May 2025 11:08) (66 - 93)  BP: 107/63 (19 May 2025 11:08) (93/63 - 132/80)  RR: 18 (19 May 2025 11:08) (18 - 18)  SpO2: 94% (19 May 2025 11:08) (94% - 96%)  I&O's Summary    18 May 2025 07:01  -  19 May 2025 07:00  --------------------------------------------------------  IN: 0 mL / OUT: 100 mL / NET: -100 mL        PHYSICAL EXAM:  General: awake   Neck: supple   Lungs: CTA   Cardio: RRR, S1/S2, No murmur  Abdomen: Soft, Nontender, Nondistended; Bowel sounds present  Extremities: No calf tenderness, No pitting edema  RUE sling in place       LABS:                        14.3   12.35 )-----------( 208      ( 19 May 2025 04:00 )             42.3     05-19    137  |  98  |  13.5  ----------------------------<  125  3.9   |  24.0  |  0.34    Ca    8.5      19 May 2025 04:00    TPro  6.5  /  Alb  3.6  /  TBili  0.9  /  DBili  x   /  AST  18  /  ALT  17  /  AlkPhos  69  05-19          PT/INR - ( 17 May 2025 21:31 )   PT: 12.3 sec;   INR: 1.06 ratio         PTT - ( 17 May 2025 21:31 )  PTT:28.1 sec                            Urinalysis Basic - ( 19 May 2025 04:00 )    Color: x / Appearance: x / SG: x / pH: x  Gluc: 125 mg/dL / Ketone: x  / Bili: x / Urobili: x   Blood: x / Protein: x / Nitrite: x   Leuk Esterase: x / RBC: x / WBC x   Sq Epi: x / Non Sq Epi: x / Bacteria: x          RADIOLOGY & ADDITIONAL TESTS:      
Patient is a 89y old  Female who presents with a chief complaint of humerus fx   pt is with hypotension overnight   UA +       ALLERGIES:  Tylenol (Unknown)    MEDICATIONS  (STANDING):  amLODIPine   Tablet 2.5 milliGRAM(s) Oral daily  aspirin  chewable 81 milliGRAM(s) Oral <User Schedule>  atorvastatin 10 milliGRAM(s) Oral at bedtime  brimonidine 0.2% Ophthalmic Solution 1 Drop(s) Both EYES two times a day  cholestyramine Powder (Sugar-Free) 4 Gram(s) Oral once  latanoprost 0.005% Ophthalmic Solution 1 Drop(s) Both EYES at bedtime  levothyroxine 100 MICROGram(s) Oral daily  oxybutynin XL 5 milliGRAM(s) Oral daily    MEDICATIONS  (PRN):  ibuprofen  Tablet. 400 milliGRAM(s) Oral every 6 hours PRN Mild Pain (1 - 3), Moderate Pain (4 - 6)  oxyCODONE    IR 2.5 milliGRAM(s) Oral every 6 hours PRN Severe Pain (7 - 10)      Vital Signs Last 24 Hrs  T(C): 37.1 (21 May 2025 15:31), Max: 37.1 (21 May 2025 07:40)  T(F): 98.7 (21 May 2025 15:31), Max: 98.7 (21 May 2025 07:40)  HR: 79 (21 May 2025 15:31) (68 - 85)  BP: 129/66 (21 May 2025 15:31) (79/55 - 129/66)  BP(mean): 73 (20 May 2025 22:16) (67 - 73)  RR: 18 (21 May 2025 15:31) (18 - 18)  SpO2: 96% (21 May 2025 15:31) (94% - 97%)    Parameters below as of 21 May 2025 15:31  Patient On (Oxygen Delivery Method): room air      General: awake   Lungs: CTA bilateral   Cardio: RRR, S1/S2, No murmur  Abdomen: Soft, Nontender, Nondistended; Bowel sounds present  Extremities:  No pitting edema  RUE sling in place       LABS:                        14.3   12.35 )-----------( 208      ( 19 May 2025 04:00 )             42.3     05-19    137  |  98  |  13.5  ----------------------------<  125  3.9   |  24.0  |  0.34    Ca    8.5      19 May 2025 04:00    TPro  6.5  /  Alb  3.6  /  TBili  0.9  /  DBili  x   /  AST  18  /  ALT  17  /  AlkPhos  69  05-19          PT/INR - ( 17 May 2025 21:31 )   PT: 12.3 sec;   INR: 1.06 ratio         PTT - ( 17 May 2025 21:31 )  PTT:28.1 sec                Urinalysis Basic - ( 19 May 2025 04:00 )    Color: x / Appearance: x / SG: x / pH: x  Gluc: 125 mg/dL / Ketone: x  / Bili: x / Urobili: x   Blood: x / Protein: x / Nitrite: x   Leuk Esterase: x / RBC: x / WBC x   Sq Epi: x / Non Sq Epi: x / Bacteria: x              
Patient is a 89y old  Female who presents with a chief complaint of humerus fx   now with  hypotension   c/o abdominal discomfort and loose BM this am   hypotensive last night given IVF   pt denies dizziness           ALLERGIES:  Tylenol (Unknown)    MEDICATIONS  (STANDING):  amLODIPine   Tablet 2.5 milliGRAM(s) Oral daily  aspirin  chewable 81 milliGRAM(s) Oral <User Schedule>  atorvastatin 10 milliGRAM(s) Oral at bedtime  brimonidine 0.2% Ophthalmic Solution 1 Drop(s) Both EYES two times a day  cholestyramine Powder (Sugar-Free) 4 Gram(s) Oral once  latanoprost 0.005% Ophthalmic Solution 1 Drop(s) Both EYES at bedtime  levothyroxine 100 MICROGram(s) Oral daily  oxybutynin XL 5 milliGRAM(s) Oral daily    MEDICATIONS  (PRN):  ibuprofen  Tablet. 400 milliGRAM(s) Oral every 6 hours PRN Mild Pain (1 - 3), Moderate Pain (4 - 6)  oxyCODONE    IR 2.5 milliGRAM(s) Oral every 6 hours PRN Severe Pain (7 - 10)      Vital Signs Last 24 Hrs  T(C): 36.8 (22 May 2025 04:48), Max: 37.1 (21 May 2025 15:31)  T(F): 98.2 (22 May 2025 04:48), Max: 98.7 (21 May 2025 15:31)  HR: 73 (22 May 2025 04:48) (71 - 84)  BP: 127/74 (22 May 2025 04:48) (70/42 - 129/66)  BP(mean): 74 (22 May 2025 00:14) (74 - 74)  RR: 18 (22 May 2025 04:48) (18 - 19)  SpO2: 95% (22 May 2025 04:48) (93% - 96%)    Parameters below as of 22 May 2025 00:14  Patient On (Oxygen Delivery Method): room air        General: awake   Lungs: CTA bilateral   Cardio: RRR, S1/S2, No murmur  Abdomen: Soft, + tenderness in left mid lower abd   BS +  Extremities:  No pitting edema  RUE sling in place   Skin multiple skin lesions tag on face chin , (  neurofibromitosis )                             12.5   10.63 )-----------( 214      ( 21 May 2025 13:00 )             39.5       05-21    137  |  101  |  19.8  ----------------------------<  109[H]  3.7   |  23.0  |  0.45[L]    Ca    8.4      21 May 2025 13:00  Phos  2.9     05-21  Mg     2.2     05-21      < from: CT Abdomen and Pelvis No Cont (05.22.25 @ 08:20) >  IMPRESSION:  1.  No bowel obstruction. Fluid throughout small and large bowel,   nonspecific.  2.  New lesion of the mid transverse colon is suspicious for neoplasm.   Follow-up colonoscopy recommended.    --- End of Report ---          JIM NETTLES MD; Fellow Radiology    < end of copied text >  < from: CT Abdomen and Pelvis No Cont (05.22.25 @ 08:20) >  BOWEL/MESENTERY: Small hiatal hernia. Left mid abdomen lesion is again   noted in the small bowel although difficult to assess for change in the   absence of intravenous contrast. Additional previously seen hypervascular   lesions in the mid abdomen and right lower quadrant are not   well-visualized. New lesion of the mid transverse colon is suspicious for   neoplasm (5:17). No bowel obstruction. Appendix is not visualized. No   evidence of inflammation in the pericecal region.    < end of copied text >  < from: CT Abdomen and Pelvis No Cont (05.22.25 @ 08:20) >  Foci of gas within abdominal wall, likely related to   injections. Postsurgical change. 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 Coarse   calcifications within gluteal subcutaneous tissues.    < end of copied text >  
Patient is a 89y old  Female who presents with a chief complaint of humerus fx     Pt is with constipation   pain better controlled today   seen in am     d/w nurse   no overnight events         ALLERGIES:  Tylenol (Unknown)    MEDICATIONS  (STANDING):  amLODIPine   Tablet 2.5 milliGRAM(s) Oral daily  aspirin  chewable 81 milliGRAM(s) Oral <User Schedule>  atorvastatin 10 milliGRAM(s) Oral at bedtime  brimonidine 0.2% Ophthalmic Solution 1 Drop(s) Both EYES two times a day  cholestyramine Powder (Sugar-Free) 4 Gram(s) Oral once  latanoprost 0.005% Ophthalmic Solution 1 Drop(s) Both EYES at bedtime  levothyroxine 100 MICROGram(s) Oral daily  oxybutynin XL 5 milliGRAM(s) Oral daily    MEDICATIONS  (PRN):  ibuprofen  Tablet. 400 milliGRAM(s) Oral every 6 hours PRN Mild Pain (1 - 3), Moderate Pain (4 - 6)  oxyCODONE    IR 2.5 milliGRAM(s) Oral every 6 hours PRN Severe Pain (7 - 10)      Vital Signs Last 24 Hrs  T(C): 36.8 (20 May 2025 11:18), Max: 37.3 (19 May 2025 23:49)  T(F): 98.2 (20 May 2025 11:18), Max: 99.2 (19 May 2025 23:49)  HR: 76 (20 May 2025 11:18) (73 - 89)  BP: 106/69 (20 May 2025 11:18) (98/62 - 113/71)  BP(mean): 74 (20 May 2025 00:30) (74 - 74)  RR: 18 (20 May 2025 11:18) (16 - 20)  SpO2: 96% (20 May 2025 11:18) (92% - 99%)    Parameters below as of 20 May 2025 11:18  Patient On (Oxygen Delivery Method): room air    General: awake   Neck: supple   Lungs: CTA   Cardio: RRR, S1/S2, No murmur  Abdomen: Soft, Nontender, Nondistended; Bowel sounds present  Extremities: No calf tenderness, No pitting edema  RUE sling in place       LABS:                        14.3   12.35 )-----------( 208      ( 19 May 2025 04:00 )             42.3     05-19    137  |  98  |  13.5  ----------------------------<  125  3.9   |  24.0  |  0.34    Ca    8.5      19 May 2025 04:00    TPro  6.5  /  Alb  3.6  /  TBili  0.9  /  DBili  x   /  AST  18  /  ALT  17  /  AlkPhos  69  05-19          PT/INR - ( 17 May 2025 21:31 )   PT: 12.3 sec;   INR: 1.06 ratio         PTT - ( 17 May 2025 21:31 )  PTT:28.1 sec                            Urinalysis Basic - ( 19 May 2025 04:00 )    Color: x / Appearance: x / SG: x / pH: x  Gluc: 125 mg/dL / Ketone: x  / Bili: x / Urobili: x   Blood: x / Protein: x / Nitrite: x   Leuk Esterase: x / RBC: x / WBC x   Sq Epi: x / Non Sq Epi: x / Bacteria: x          RADIOLOGY & ADDITIONAL TESTS:

## 2025-05-22 NOTE — CONSULT NOTE ADULT - NS ATTEND AMEND GEN_ALL_CORE FT
Pt is 89 yo F who presented after fall and found to have humerus fracture. In ED, she had CT AP which showed possible transverse colon lesion. She had a colonoscopy less than 3 years ago with Dr Elizabeth which showed 3 small polyps which were removed. Prepping for colonoscopy would be extremely difficult at this time considering humerus fracture. She had a few episodes of diarrhea after receiving multiple laxatives, suspect this will improve now that laxatives have been discontinued. Recommend outpatient follow up with Dr Elizabeth to discuss repeat colonoscopy. Please call GI back as needed.

## 2025-05-22 NOTE — DISCUSSION/SUMMARY
Health Maintenance       COVID-19 Vaccine (1 - 2024-25 season)  Postponed until 9/24/2025           Following review of the above:  Patient is not proceeding with: COVID-19    Note: Refer to final orders and clinician documentation.        Review Flowsheet  More data exists         5/22/2025   PHQ 2/9 Score   Adult PHQ 2 Score 0   Adult PHQ 2 Interpretation No further screening needed   Little interest or pleasure in activity? Not at all   Feeling down, depressed or hopeless? Not at all        [de-identified] : 87F w/ L sup/inf pelvic ring fx 6 weeks from injury; pt still with pain to L groin with ambulation.  Pt to continue WBAT LLE with assistive devices. New prescription for physical therapy sent. In addressing her bone health, I sent a prescription for calcitonin, as well as a prescription for a DEXA scan, and we discussed following up with her primary care or endocrinologist. She may follow up as needed. All questions answered, pt understands/agrees with plan. \par \par Orthopaedic Trauma Surgeon Addendum:\par \par I agree with the above resident physician note.  \par Chart was reviewed and patient examined in the orthopedic office. I agree with the assessment and plan of the resident.\par \par I was physically present for the key portions of the evaluation and management service provided. I agree with the above history, physical and plan. Appropriate imaging has been reviewed and the plan adjusted as needed.\par \par Osteopenia and osteoporosis are significant risk factors for fragility fractures. Given the type of fracture that has occurred, I would highly recommend that the patient followup with their primary care physician to discuss treatment for low bone mineral density. We discussed the benefits of these medications frequently far outweigh the small risks. Their primary care physician can call the office with any specific questions or concerns.\par \par The patient was given the opportunity to ask questions and all questions were answered to their satisfaction.\par \par Guanakito Ramos MD\par Orthopaedic Trauma Surgeon\par [facility]\par [facility] [place]

## 2025-05-23 VITALS
SYSTOLIC BLOOD PRESSURE: 98 MMHG | HEART RATE: 77 BPM | OXYGEN SATURATION: 98 % | RESPIRATION RATE: 17 BRPM | DIASTOLIC BLOOD PRESSURE: 56 MMHG | TEMPERATURE: 98 F

## 2025-05-23 LAB
ALBUMIN SERPL ELPH-MCNC: 2.5 G/DL — LOW (ref 3.3–5.2)
ALP SERPL-CCNC: 50 U/L — SIGNIFICANT CHANGE UP (ref 40–120)
ALT FLD-CCNC: 10 U/L — SIGNIFICANT CHANGE UP
ANION GAP SERPL CALC-SCNC: 13 MMOL/L — SIGNIFICANT CHANGE UP (ref 5–17)
AST SERPL-CCNC: 13 U/L — SIGNIFICANT CHANGE UP
BASOPHILS # BLD AUTO: 0.04 K/UL — SIGNIFICANT CHANGE UP (ref 0–0.2)
BASOPHILS NFR BLD AUTO: 0.5 % — SIGNIFICANT CHANGE UP (ref 0–2)
BILIRUB SERPL-MCNC: 0.4 MG/DL — SIGNIFICANT CHANGE UP (ref 0.4–2)
BUN SERPL-MCNC: 11.8 MG/DL — SIGNIFICANT CHANGE UP (ref 8–20)
CALCIUM SERPL-MCNC: 7.7 MG/DL — LOW (ref 8.4–10.5)
CHLORIDE SERPL-SCNC: 103 MMOL/L — SIGNIFICANT CHANGE UP (ref 96–108)
CO2 SERPL-SCNC: 20 MMOL/L — LOW (ref 22–29)
CREAT SERPL-MCNC: 0.37 MG/DL — LOW (ref 0.5–1.3)
CULTURE RESULTS: SIGNIFICANT CHANGE UP
EGFR: 95 ML/MIN/1.73M2 — SIGNIFICANT CHANGE UP
EGFR: 95 ML/MIN/1.73M2 — SIGNIFICANT CHANGE UP
EOSINOPHIL # BLD AUTO: 0.13 K/UL — SIGNIFICANT CHANGE UP (ref 0–0.5)
EOSINOPHIL NFR BLD AUTO: 1.6 % — SIGNIFICANT CHANGE UP (ref 0–6)
GLUCOSE SERPL-MCNC: 107 MG/DL — HIGH (ref 70–99)
HCT VFR BLD CALC: 29.6 % — LOW (ref 34.5–45)
HCT VFR BLD CALC: 33.6 % — LOW (ref 34.5–45)
HGB BLD-MCNC: 11 G/DL — LOW (ref 11.5–15.5)
HGB BLD-MCNC: 9.4 G/DL — LOW (ref 11.5–15.5)
IMM GRANULOCYTES # BLD AUTO: 0.06 K/UL — SIGNIFICANT CHANGE UP (ref 0–0.07)
IMM GRANULOCYTES NFR BLD AUTO: 0.7 % — SIGNIFICANT CHANGE UP (ref 0–0.9)
LYMPHOCYTES # BLD AUTO: 1.13 K/UL — SIGNIFICANT CHANGE UP (ref 1–3.3)
LYMPHOCYTES NFR BLD AUTO: 13.7 % — SIGNIFICANT CHANGE UP (ref 13–44)
MAGNESIUM SERPL-MCNC: 1.7 MG/DL — SIGNIFICANT CHANGE UP (ref 1.6–2.6)
MCHC RBC-ENTMCNC: 29 PG — SIGNIFICANT CHANGE UP (ref 27–34)
MCHC RBC-ENTMCNC: 30 PG — SIGNIFICANT CHANGE UP (ref 27–34)
MCHC RBC-ENTMCNC: 31.8 G/DL — LOW (ref 32–36)
MCHC RBC-ENTMCNC: 32.7 G/DL — SIGNIFICANT CHANGE UP (ref 32–36)
MCV RBC AUTO: 91.4 FL — SIGNIFICANT CHANGE UP (ref 80–100)
MCV RBC AUTO: 91.6 FL — SIGNIFICANT CHANGE UP (ref 80–100)
MONOCYTES # BLD AUTO: 1.39 K/UL — HIGH (ref 0–0.9)
MONOCYTES NFR BLD AUTO: 16.9 % — HIGH (ref 2–14)
NEUTROPHILS # BLD AUTO: 5.47 K/UL — SIGNIFICANT CHANGE UP (ref 1.8–7.4)
NEUTROPHILS NFR BLD AUTO: 66.6 % — SIGNIFICANT CHANGE UP (ref 43–77)
NRBC # BLD AUTO: 0 K/UL — SIGNIFICANT CHANGE UP (ref 0–0)
NRBC # BLD AUTO: 0 K/UL — SIGNIFICANT CHANGE UP (ref 0–0)
NRBC # FLD: 0 K/UL — SIGNIFICANT CHANGE UP (ref 0–0)
NRBC # FLD: 0 K/UL — SIGNIFICANT CHANGE UP (ref 0–0)
NRBC BLD AUTO-RTO: 0 /100 WBCS — SIGNIFICANT CHANGE UP (ref 0–0)
NRBC BLD AUTO-RTO: 0 /100 WBCS — SIGNIFICANT CHANGE UP (ref 0–0)
PHOSPHATE SERPL-MCNC: 2.5 MG/DL — SIGNIFICANT CHANGE UP (ref 2.4–4.7)
PLATELET # BLD AUTO: 199 K/UL — SIGNIFICANT CHANGE UP (ref 150–400)
PLATELET # BLD AUTO: 213 K/UL — SIGNIFICANT CHANGE UP (ref 150–400)
PMV BLD: 11 FL — SIGNIFICANT CHANGE UP (ref 7–13)
PMV BLD: 11.1 FL — SIGNIFICANT CHANGE UP (ref 7–13)
POTASSIUM SERPL-MCNC: 3.5 MMOL/L — SIGNIFICANT CHANGE UP (ref 3.5–5.3)
POTASSIUM SERPL-SCNC: 3.5 MMOL/L — SIGNIFICANT CHANGE UP (ref 3.5–5.3)
PROCALCITONIN SERPL-MCNC: 0.11 NG/ML — HIGH (ref 0.02–0.1)
PROT SERPL-MCNC: 5 G/DL — LOW (ref 6.6–8.7)
RBC # BLD: 3.24 M/UL — LOW (ref 3.8–5.2)
RBC # BLD: 3.67 M/UL — LOW (ref 3.8–5.2)
RBC # FLD: 13.5 % — SIGNIFICANT CHANGE UP (ref 10.3–14.5)
RBC # FLD: 13.5 % — SIGNIFICANT CHANGE UP (ref 10.3–14.5)
SODIUM SERPL-SCNC: 136 MMOL/L — SIGNIFICANT CHANGE UP (ref 135–145)
SPECIMEN SOURCE: SIGNIFICANT CHANGE UP
WBC # BLD: 8.22 K/UL — SIGNIFICANT CHANGE UP (ref 3.8–10.5)
WBC # BLD: 8.73 K/UL — SIGNIFICANT CHANGE UP (ref 3.8–10.5)
WBC # FLD AUTO: 8.22 K/UL — SIGNIFICANT CHANGE UP (ref 3.8–10.5)
WBC # FLD AUTO: 8.73 K/UL — SIGNIFICANT CHANGE UP (ref 3.8–10.5)

## 2025-05-23 PROCEDURE — 72125 CT NECK SPINE W/O DYE: CPT

## 2025-05-23 PROCEDURE — 82306 VITAMIN D 25 HYDROXY: CPT

## 2025-05-23 PROCEDURE — 84443 ASSAY THYROID STIM HORMONE: CPT

## 2025-05-23 PROCEDURE — 80048 BASIC METABOLIC PNL TOTAL CA: CPT

## 2025-05-23 PROCEDURE — 93005 ELECTROCARDIOGRAM TRACING: CPT

## 2025-05-23 PROCEDURE — 73070 X-RAY EXAM OF ELBOW: CPT

## 2025-05-23 PROCEDURE — 36415 COLL VENOUS BLD VENIPUNCTURE: CPT

## 2025-05-23 PROCEDURE — 80053 COMPREHEN METABOLIC PANEL: CPT

## 2025-05-23 PROCEDURE — 83735 ASSAY OF MAGNESIUM: CPT

## 2025-05-23 PROCEDURE — 84100 ASSAY OF PHOSPHORUS: CPT

## 2025-05-23 PROCEDURE — 70450 CT HEAD/BRAIN W/O DYE: CPT

## 2025-05-23 PROCEDURE — 73562 X-RAY EXAM OF KNEE 3: CPT

## 2025-05-23 PROCEDURE — 87086 URINE CULTURE/COLONY COUNT: CPT

## 2025-05-23 PROCEDURE — 99239 HOSP IP/OBS DSCHRG MGMT >30: CPT

## 2025-05-23 PROCEDURE — 87641 MR-STAPH DNA AMP PROBE: CPT

## 2025-05-23 PROCEDURE — 85027 COMPLETE CBC AUTOMATED: CPT

## 2025-05-23 PROCEDURE — 85025 COMPLETE CBC W/AUTO DIFF WBC: CPT

## 2025-05-23 PROCEDURE — 87640 STAPH A DNA AMP PROBE: CPT

## 2025-05-23 PROCEDURE — 81001 URINALYSIS AUTO W/SCOPE: CPT

## 2025-05-23 PROCEDURE — 74018 RADEX ABDOMEN 1 VIEW: CPT

## 2025-05-23 PROCEDURE — 71045 X-RAY EXAM CHEST 1 VIEW: CPT

## 2025-05-23 PROCEDURE — 99285 EMERGENCY DEPT VISIT HI MDM: CPT

## 2025-05-23 PROCEDURE — 82746 ASSAY OF FOLIC ACID SERUM: CPT

## 2025-05-23 PROCEDURE — 73030 X-RAY EXAM OF SHOULDER: CPT

## 2025-05-23 PROCEDURE — 74176 CT ABD & PELVIS W/O CONTRAST: CPT

## 2025-05-23 PROCEDURE — 85730 THROMBOPLASTIN TIME PARTIAL: CPT

## 2025-05-23 PROCEDURE — 84145 PROCALCITONIN (PCT): CPT

## 2025-05-23 PROCEDURE — 82607 VITAMIN B-12: CPT

## 2025-05-23 PROCEDURE — 85610 PROTHROMBIN TIME: CPT

## 2025-05-23 RX ORDER — ACETAMINOPHEN 500 MG/5ML
650 LIQUID (ML) ORAL ONCE
Refills: 0 | Status: COMPLETED | OUTPATIENT
Start: 2025-05-23 | End: 2025-05-23

## 2025-05-23 RX ORDER — ACETAMINOPHEN 500 MG/5ML
2 LIQUID (ML) ORAL
Qty: 0 | Refills: 0 | DISCHARGE
Start: 2025-05-23

## 2025-05-23 RX ORDER — CIPROFLOXACIN HCL 250 MG
1 TABLET ORAL
Qty: 0 | Refills: 0 | DISCHARGE

## 2025-05-23 RX ORDER — ACETAMINOPHEN 500 MG/5ML
650 LIQUID (ML) ORAL EVERY 6 HOURS
Refills: 0 | Status: DISCONTINUED | OUTPATIENT
Start: 2025-05-23 | End: 2025-05-23

## 2025-05-23 RX ORDER — NALOXONE HYDROCHLORIDE 0.4 MG/ML
1 INJECTION, SOLUTION INTRAMUSCULAR; INTRAVENOUS; SUBCUTANEOUS
Qty: 1 | Refills: 0
Start: 2025-05-23 | End: 2025-05-23

## 2025-05-23 RX ADMIN — Medication 650 MILLIGRAM(S): at 12:41

## 2025-05-23 RX ADMIN — Medication 1 APPLICATION(S): at 05:11

## 2025-05-23 RX ADMIN — Medication 100 MICROGRAM(S): at 05:11

## 2025-05-23 RX ADMIN — Medication 650 MILLIGRAM(S): at 11:41

## 2025-05-23 RX ADMIN — BRIMONIDINE TARTRATE 1 DROP(S): 1.5 SOLUTION/ DROPS OPHTHALMIC at 05:11

## 2025-05-23 NOTE — DIETITIAN INITIAL EVALUATION ADULT - ETIOLOGY
Related to inadequate protein-energy intake in setting of advanced age with persistent lack of appetite and altered GI function (diarrhea)

## 2025-05-23 NOTE — DIETITIAN INITIAL EVALUATION ADULT - PERTINENT LABORATORY DATA
05-23    136  |  103  |  11.8  ----------------------------<  107[H]  3.5   |  20.0[L]  |  0.37[L]    Ca    7.7[L]      23 May 2025 06:32  Phos  2.5     05-23  Mg     1.7     05-23    TPro  5.0[L]  /  Alb  2.5[L]  /  TBili  0.4  /  DBili  x   /  AST  13  /  ALT  10  /  AlkPhos  50  05-23

## 2025-05-23 NOTE — DIETITIAN INITIAL EVALUATION ADULT - REASON FOR ADMISSION
Other displaced fracture of upper end of unspecified humerus, initial encounter for closed fracture

## 2025-05-23 NOTE — DIETITIAN INITIAL EVALUATION ADULT - ORAL INTAKE PTA/DIET HISTORY
Pt reports eating very small meals at home, appetite is not the same noted. Pt reports eating a small english muffin and tea for breakfast. Pt endorses weight loss of ~12lbs in past 3 months. Pt complains of persistent diarrhea, GI was consulted noted. Brief NFPE conducted.

## 2025-05-23 NOTE — DIETITIAN INITIAL EVALUATION ADULT - OTHER INFO
Pt is a 89 year old Female with hx of hypothyroidism, HTN, HLD, OAB presenting to the hospital after mechanical fall. Patient found with right humeral neck fracture.

## 2025-06-03 NOTE — CDI QUERY NOTE - NSCDIOTHERTXTBX_GEN_ALL_CORE_HH
Clinical documentation and/or evidence in the medical record indicates that this patient has osteoporosis and a fracture.  In order to ensure accurate coding and accuracy of the clinical record, the documentation in this patient’s medical record requires additional clarification.      Please include more specific documentation as to the type of fracture in your progress note and/or discharge summary.      Please clarify if the patient was found to have one of the following:    •	Pathological right humeral neck fracture associated with osteoporosis  •	Osteoporotic right humeral neck  fracture   •	Other (specify)      Supporting documentation and/or clinical evidence:     H&P- PAST MEDICAL HISTORY:  Osteoporosis   Home Medications:  alendronate weekly:   89F hx of hypothyroidism, HTN, HLD, OAB presenting to the hospital after mechanical fall. Patient with right humeral neck fracture.

## 2025-07-16 NOTE — PATIENT PROFILE ADULT - CAREGIVER NAME
[[email or web address]] : 80 year old female with low back and lumbar radicular pain now significantly improved following her most recent injection.  She will continue with a course of medically supervised PT.  She will follow up with me at the earliest sign that her pain worsens for further injection therapy as necessary. Eilen

## (undated) DEVICE — VALVE ENDOSCOPE DEFENDO SINGLE USE

## (undated) DEVICE — FORCEP RADIAL JAW 4 W NDL 2.4MM 2.8MM 240CM ORANGE DISP

## (undated) DEVICE — FORCEP RADIAL JAW 4 240CM DISP

## (undated) DEVICE — SSH-ERCP SCOPE TJF Q 180 V 2203371: Type: DURABLE MEDICAL EQUIPMENT

## (undated) DEVICE — SSH-EBUS GFUE160 AL51810834: Type: DURABLE MEDICAL EQUIPMENT

## (undated) DEVICE — STERIS DEFENDO 3-PIECE KIT (AIR/WATER, SUCTION & BIOPSY VALVES)